# Patient Record
Sex: MALE | Race: WHITE | Employment: FULL TIME | URBAN - METROPOLITAN AREA
[De-identification: names, ages, dates, MRNs, and addresses within clinical notes are randomized per-mention and may not be internally consistent; named-entity substitution may affect disease eponyms.]

---

## 2017-07-17 RX ORDER — ATORVASTATIN CALCIUM 40 MG/1
TABLET, FILM COATED ORAL
Qty: 90 TABLET | Refills: 0 | Status: SHIPPED | OUTPATIENT
Start: 2017-07-17 | End: 2017-08-02

## 2017-07-17 NOTE — TELEPHONE ENCOUNTER
Rx request for Atorvastatin 40 mg, PASSED Cholesterol Protocol. Rx filled per protocol.     Cholesterol Medications  Protocol Criteria:  · Appointment scheduled in the past 12 months or in the next 3 months  · ALT & LDL on file in the past 12 months  · ALT

## 2017-07-24 NOTE — TELEPHONE ENCOUNTER
Left message on voice mail patient due for follow up appt left office phone number to call and schedule an appt and then we can send in refill.

## 2017-07-28 RX ORDER — ATORVASTATIN CALCIUM 40 MG/1
TABLET, FILM COATED ORAL
Qty: 30 TABLET | Refills: 0 | OUTPATIENT
Start: 2017-07-28

## 2017-08-02 ENCOUNTER — OFFICE VISIT (OUTPATIENT)
Dept: INTERNAL MEDICINE CLINIC | Facility: CLINIC | Age: 57
End: 2017-08-02

## 2017-08-02 VITALS
WEIGHT: 150 LBS | BODY MASS INDEX: 22.73 KG/M2 | HEART RATE: 75 BPM | SYSTOLIC BLOOD PRESSURE: 118 MMHG | OXYGEN SATURATION: 97 % | TEMPERATURE: 98 F | DIASTOLIC BLOOD PRESSURE: 68 MMHG | HEIGHT: 68 IN

## 2017-08-02 DIAGNOSIS — Z12.11 SCREENING FOR COLON CANCER: ICD-10-CM

## 2017-08-02 DIAGNOSIS — E78.00 HYPERCHOLESTEROLEMIA: Primary | ICD-10-CM

## 2017-08-02 PROCEDURE — 99212 OFFICE O/P EST SF 10 MIN: CPT | Performed by: INTERNAL MEDICINE

## 2017-08-02 PROCEDURE — 99214 OFFICE O/P EST MOD 30 MIN: CPT | Performed by: INTERNAL MEDICINE

## 2017-08-02 RX ORDER — ATORVASTATIN CALCIUM 40 MG/1
TABLET, FILM COATED ORAL
Qty: 90 TABLET | Refills: 0 | Status: SHIPPED | OUTPATIENT
Start: 2017-08-02 | End: 2017-10-29

## 2017-08-03 NOTE — PROGRESS NOTES
HPI:    Patient ID: Arlin Wheeler is a 62year old male.     HPI  He is here today for refill on his meds a   He states that he is taking meds for high cholesterol regularly but he is not watching his diet    He denies any complains    He never had Application topically 2 (two) times daily.  Disp: 60 g Rfl: 5     Allergies:No Known Allergies    HISTORY:  Past Medical History:   Diagnosis Date   • Cervical pain       Past Surgical History:  2001: LAMINECTOMY   Family History   Problem Relation Age of O No murmur heard. Pulmonary/Chest: Effort normal and breath sounds normal. No accessory muscle usage. No respiratory distress. He has no wheezes. He has no rales. He exhibits no tenderness. Abdominal: Soft.  Bowel sounds are normal. He exhibits no shift

## 2017-08-03 NOTE — PATIENT INSTRUCTIONS
Colorectal Cancer Screening    Colorectal cancer (cancer in the colon or rectum) is a leading cause of cancer deaths in the [place] But it doesn’t have to be. When this cancer is found and removed early, the chances of a full recovery are very good.  Because Polyps are growths that form on the inner lining of the colon or rectum. Most are benign, which means they aren’t cancerous. But over time, some polyps can become cancer (malignant). This happens when cells in these polyps begin growing abnormally.  In time This test uses X-rays to provide images of the entire colon and rectum. The day before this test, you will need to do a bowel prep to clean out the colon and rectum. A bowel prep is a liquid diet plus strong laxatives or enemas.  You will be awake for the t · Colonoscopy. This test can be used to find and remove polyps anywhere in the colon or rectum. The day before the test, you will do a bowel prep. This is a liquid diet plus a strong laxative solution or an enema. The bowel prep will cleanse your colon.  Yvonne Urbina · Vomiting   Date Last Reviewed: 11/4/2015  © 2236-5520 The [facility], [address] [place], [place] [address]. All rights reserved. This information is not intended as a substitute for professional medical care.  Always follow your healthcare

## 2017-08-12 ENCOUNTER — APPOINTMENT (OUTPATIENT)
Dept: LAB | Age: 57
End: 2017-08-12
Attending: INTERNAL MEDICINE
Payer: COMMERCIAL

## 2017-08-12 ENCOUNTER — NURSE ONLY (OUTPATIENT)
Dept: INTERNAL MEDICINE CLINIC | Facility: CLINIC | Age: 57
End: 2017-08-12

## 2017-08-12 DIAGNOSIS — Z12.11 SCREENING FOR COLON CANCER: Primary | ICD-10-CM

## 2017-08-12 DIAGNOSIS — E78.00 HYPERCHOLESTEROLEMIA: ICD-10-CM

## 2017-08-12 LAB
CHOLEST SERPL-MCNC: 119 MG/DL (ref 110–200)
HDLC SERPL-MCNC: 35 MG/DL
HEMOCCULT STL QL: NEGATIVE
LDLC SERPL CALC-MCNC: 70 MG/DL (ref 0–99)
NONHDLC SERPL-MCNC: 84 MG/DL
TRIGL SERPL-MCNC: 69 MG/DL (ref 1–149)

## 2017-08-12 PROCEDURE — 80061 LIPID PANEL: CPT

## 2017-08-12 PROCEDURE — 36415 COLL VENOUS BLD VENIPUNCTURE: CPT

## 2017-08-14 ENCOUNTER — TELEPHONE (OUTPATIENT)
Dept: INTERNAL MEDICINE CLINIC | Facility: CLINIC | Age: 57
End: 2017-08-14

## 2017-10-30 RX ORDER — ATORVASTATIN CALCIUM 40 MG/1
TABLET, FILM COATED ORAL
Qty: 90 TABLET | Refills: 0 | Status: SHIPPED | OUTPATIENT
Start: 2017-10-30 | End: 2017-12-18

## 2017-12-11 RX ORDER — ATORVASTATIN CALCIUM 40 MG/1
TABLET, FILM COATED ORAL
Qty: 90 TABLET | Refills: 0 | OUTPATIENT
Start: 2017-12-11

## 2017-12-18 ENCOUNTER — TELEPHONE (OUTPATIENT)
Dept: INTERNAL MEDICINE CLINIC | Facility: CLINIC | Age: 57
End: 2017-12-18

## 2017-12-18 RX ORDER — ATORVASTATIN CALCIUM 40 MG/1
TABLET, FILM COATED ORAL
Qty: 30 TABLET | Refills: 0 | Status: SHIPPED | OUTPATIENT
Start: 2017-12-18 | End: 2018-03-27

## 2017-12-18 NOTE — TELEPHONE ENCOUNTER
Needs refill until his next appointment     Current Outpatient Prescriptions:   •  ATORVASTATIN 40 MG Oral Tab, TAKE 1 TABLET(40 MG) BY MOUTH EVERY NIGHT, Disp: 90 tablet, Rfl: 0  •

## 2017-12-18 NOTE — TELEPHONE ENCOUNTER
Rx request for Atorvastatin 40 mg, Filled per protocol.      Cholesterol Medications  Protocol Criteria:  · Appointment scheduled in the past 12 months or in the next 3 months  · ALT & LDL on file in the past 12 months  · ALT result < 80  · LDL result <130

## 2017-12-30 ENCOUNTER — OFFICE VISIT (OUTPATIENT)
Dept: FAMILY MEDICINE CLINIC | Facility: CLINIC | Age: 57
End: 2017-12-30

## 2017-12-30 VITALS
RESPIRATION RATE: 16 BRPM | HEART RATE: 74 BPM | BODY MASS INDEX: 23 KG/M2 | TEMPERATURE: 98 F | DIASTOLIC BLOOD PRESSURE: 70 MMHG | WEIGHT: 150 LBS | SYSTOLIC BLOOD PRESSURE: 128 MMHG | OXYGEN SATURATION: 96 %

## 2017-12-30 DIAGNOSIS — J20.8 ACUTE BACTERIAL BRONCHITIS: Primary | ICD-10-CM

## 2017-12-30 DIAGNOSIS — B96.89 ACUTE BACTERIAL BRONCHITIS: Primary | ICD-10-CM

## 2017-12-30 PROCEDURE — 99202 OFFICE O/P NEW SF 15 MIN: CPT | Performed by: PHYSICIAN ASSISTANT

## 2017-12-30 RX ORDER — AZITHROMYCIN 250 MG/1
TABLET, FILM COATED ORAL
Qty: 6 TABLET | Refills: 0 | Status: ON HOLD | OUTPATIENT
Start: 2017-12-30 | End: 2018-03-31

## 2017-12-30 NOTE — PROGRESS NOTES
CHIEF COMPLAINT:     Patient presents with:  Cough: 2 weeks,  bringing up yellow phelgm      HPI:   Jocelyn Ornelas is a 62year old male who presents for cough for  2 weeks.  Cough started gradually and is described as  productive and getting  wors 16   Wt 150 lb   SpO2 96%   BMI 22.81 kg/m²   GENERAL: alert and oriented x 3, no acute distress  SKIN: no rashes, no suspicious lesions  HEAD: atraumatic, normocephalic, non tenderness on palpation of maxillary sinuses, no frontal sinus tenderness  EYES:

## 2018-03-27 ENCOUNTER — TELEPHONE (OUTPATIENT)
Dept: INTERNAL MEDICINE CLINIC | Facility: CLINIC | Age: 58
End: 2018-03-27

## 2018-03-27 RX ORDER — ATORVASTATIN CALCIUM 40 MG/1
TABLET, FILM COATED ORAL
Qty: 90 TABLET | Refills: 0 | Status: SHIPPED | OUTPATIENT
Start: 2018-03-27 | End: 2018-06-08

## 2018-03-31 ENCOUNTER — HOSPITAL ENCOUNTER (INPATIENT)
Facility: HOSPITAL | Age: 58
LOS: 5 days | Discharge: HOME | DRG: 027 | End: 2018-04-05
Attending: EMERGENCY MEDICINE | Admitting: INTERNAL MEDICINE
Payer: COMMERCIAL

## 2018-03-31 ENCOUNTER — APPOINTMENT (OUTPATIENT)
Dept: GENERAL RADIOLOGY | Facility: HOSPITAL | Age: 58
DRG: 027 | End: 2018-03-31
Attending: EMERGENCY MEDICINE
Payer: COMMERCIAL

## 2018-03-31 ENCOUNTER — APPOINTMENT (OUTPATIENT)
Dept: CT IMAGING | Facility: HOSPITAL | Age: 58
DRG: 027 | End: 2018-03-31
Attending: EMERGENCY MEDICINE
Payer: COMMERCIAL

## 2018-03-31 DIAGNOSIS — R42 DIZZINESS: ICD-10-CM

## 2018-03-31 DIAGNOSIS — R90.0 INTRACRANIAL MASS: Primary | ICD-10-CM

## 2018-03-31 PROCEDURE — 70450 CT HEAD/BRAIN W/O DYE: CPT | Performed by: EMERGENCY MEDICINE

## 2018-03-31 PROCEDURE — 71045 X-RAY EXAM CHEST 1 VIEW: CPT | Performed by: EMERGENCY MEDICINE

## 2018-03-31 RX ORDER — NICOTINE 21 MG/24HR
1 PATCH, TRANSDERMAL 24 HOURS TRANSDERMAL DAILY
Status: DISCONTINUED | OUTPATIENT
Start: 2018-03-31 | End: 2018-04-05

## 2018-03-31 RX ORDER — ONDANSETRON 2 MG/ML
4 INJECTION INTRAMUSCULAR; INTRAVENOUS ONCE
Status: COMPLETED | OUTPATIENT
Start: 2018-03-31 | End: 2018-03-31

## 2018-03-31 RX ORDER — SODIUM CHLORIDE 9 MG/ML
INJECTION, SOLUTION INTRAVENOUS CONTINUOUS
Status: DISPENSED | OUTPATIENT
Start: 2018-03-31 | End: 2018-04-01

## 2018-03-31 RX ORDER — ONDANSETRON 2 MG/ML
4 INJECTION INTRAMUSCULAR; INTRAVENOUS EVERY 6 HOURS PRN
Status: DISCONTINUED | OUTPATIENT
Start: 2018-03-31 | End: 2018-04-05

## 2018-03-31 RX ORDER — ACETAMINOPHEN 325 MG/1
650 TABLET ORAL EVERY 6 HOURS PRN
Status: DISCONTINUED | OUTPATIENT
Start: 2018-03-31 | End: 2018-04-01

## 2018-03-31 RX ORDER — DIAZEPAM 5 MG/1
5 TABLET ORAL EVERY 6 HOURS PRN
Status: DISCONTINUED | OUTPATIENT
Start: 2018-03-31 | End: 2018-04-05

## 2018-03-31 RX ORDER — DIAZEPAM 5 MG/1
5 TABLET ORAL ONCE
Status: COMPLETED | OUTPATIENT
Start: 2018-03-31 | End: 2018-03-31

## 2018-03-31 RX ORDER — ATORVASTATIN CALCIUM 40 MG/1
40 TABLET, FILM COATED ORAL NIGHTLY
Status: DISCONTINUED | OUTPATIENT
Start: 2018-04-01 | End: 2018-04-05

## 2018-03-31 RX ORDER — DEXAMETHASONE SODIUM PHOSPHATE 4 MG/ML
6 VIAL (ML) INJECTION EVERY 6 HOURS
Status: DISCONTINUED | OUTPATIENT
Start: 2018-03-31 | End: 2018-04-03

## 2018-04-01 ENCOUNTER — APPOINTMENT (OUTPATIENT)
Dept: ULTRASOUND IMAGING | Facility: HOSPITAL | Age: 58
DRG: 027 | End: 2018-04-01
Attending: Other
Payer: COMMERCIAL

## 2018-04-01 ENCOUNTER — APPOINTMENT (OUTPATIENT)
Dept: MRI IMAGING | Facility: HOSPITAL | Age: 58
DRG: 027 | End: 2018-04-01
Attending: EMERGENCY MEDICINE
Payer: COMMERCIAL

## 2018-04-01 ENCOUNTER — APPOINTMENT (OUTPATIENT)
Dept: GENERAL RADIOLOGY | Facility: HOSPITAL | Age: 58
DRG: 027 | End: 2018-04-01
Attending: INTERNAL MEDICINE
Payer: COMMERCIAL

## 2018-04-01 ENCOUNTER — APPOINTMENT (OUTPATIENT)
Dept: CV DIAGNOSTICS | Facility: HOSPITAL | Age: 58
DRG: 027 | End: 2018-04-01
Attending: Other
Payer: COMMERCIAL

## 2018-04-01 PROCEDURE — 93306 TTE W/DOPPLER COMPLETE: CPT | Performed by: OTHER

## 2018-04-01 PROCEDURE — 93880 EXTRACRANIAL BILAT STUDY: CPT | Performed by: OTHER

## 2018-04-01 PROCEDURE — 99254 IP/OBS CNSLTJ NEW/EST MOD 60: CPT | Performed by: OTHER

## 2018-04-01 PROCEDURE — 71046 X-RAY EXAM CHEST 2 VIEWS: CPT | Performed by: INTERNAL MEDICINE

## 2018-04-01 PROCEDURE — 70553 MRI BRAIN STEM W/O & W/DYE: CPT | Performed by: EMERGENCY MEDICINE

## 2018-04-01 RX ORDER — IPRATROPIUM BROMIDE AND ALBUTEROL SULFATE 2.5; .5 MG/3ML; MG/3ML
3 SOLUTION RESPIRATORY (INHALATION)
Status: DISCONTINUED | OUTPATIENT
Start: 2018-04-02 | End: 2018-04-04

## 2018-04-01 RX ORDER — LORAZEPAM 2 MG/ML
0.5 INJECTION INTRAMUSCULAR EVERY 6 HOURS PRN
Status: DISCONTINUED | OUTPATIENT
Start: 2018-04-01 | End: 2018-04-05

## 2018-04-01 RX ORDER — IPRATROPIUM BROMIDE AND ALBUTEROL SULFATE 2.5; .5 MG/3ML; MG/3ML
3 SOLUTION RESPIRATORY (INHALATION) EVERY 6 HOURS PRN
Status: DISCONTINUED | OUTPATIENT
Start: 2018-04-01 | End: 2018-04-05

## 2018-04-01 RX ORDER — LORAZEPAM 2 MG/ML
1 INJECTION INTRAMUSCULAR EVERY 6 HOURS PRN
Status: DISCONTINUED | OUTPATIENT
Start: 2018-04-01 | End: 2018-04-05

## 2018-04-01 RX ORDER — ACETAMINOPHEN 325 MG/1
650 TABLET ORAL EVERY 4 HOURS PRN
Status: DISCONTINUED | OUTPATIENT
Start: 2018-04-01 | End: 2018-04-02

## 2018-04-01 RX ORDER — 0.9 % SODIUM CHLORIDE 0.9 %
VIAL (ML) INJECTION
Status: COMPLETED
Start: 2018-04-01 | End: 2018-04-01

## 2018-04-01 RX ORDER — ACETAMINOPHEN 650 MG/1
650 SUPPOSITORY RECTAL EVERY 4 HOURS PRN
Status: DISCONTINUED | OUTPATIENT
Start: 2018-04-01 | End: 2018-04-02

## 2018-04-01 NOTE — ED NOTES
Pt presents c/o dizziness and lack of appetite. Pt states that the dizziness started around 5:30 this evening. Pt states that he feels like the room is spinning and it improves when he closes his eyes. Pt denies hx of vertigo.  Pt states that he is a smoker

## 2018-04-01 NOTE — CM/SW NOTE
SW attempted visit. Pt and family visiting with doctor. SW will f/u tomorrow to discuss initial assessment and dc plans. PT on hold until neurosurgical plan is clarified.     ulices doherty,DEBRA [id number]

## 2018-04-01 NOTE — CONSULTS
Neurosurgery Consult Note    _______________________ PATIENT HISTORY _______________________    CC: Dizziness    HPI: A neurosurgery consult was requested by Dr. Kristin Brown. (04/01/18):  The patient is a 60yo WM who began having acute onset of dizziness, n History Narrative   None on file         ALLERGIES:  No Known Allergies    MEDICATIONS:    No current facility-administered medications on file prior to encounter.    Current Outpatient Prescriptions on File Prior to Encounter:  atorvastatin 40 MG Oral Tab joints, bones and muscle/tendons noted above reveal no crepitation, contracture or limitation except as noted: NONE    Muscle tone shows no spasticity, flaccidity, cogwheeling,or atrophy in areas noted above except as noted: NONE          _________________ additional followup.   Dictated by (CST): Anna Singer MD on 3/31/2018 at 19:49     Approved by (CST): Anna Singer MD on 3/31/2018 at 19:51                    Bone scan:      Discogram:      Myelogram:      DIAGNOSES:    Right brain tumor, probab

## 2018-04-01 NOTE — CONSULTS
[facility]    Report of Consultation    [facility] Patient Status:  Inpatient    4/3/1960 MRN [id number]   Location [facility] 2W/SW Attending Lilian Royal MD   Hosp Day # 1 PCP Sebastian Yun MD     Date of Adm Phosphate (DECADRON) 4 MG/ML injection 6 mg 6 mg Oral Q6H   levETIRAcetam (KEPPRA) 500 mg in sodium chloride 0.9 % 100 mL IVPB 500 mg Intravenous Q12H   atorvastatin (LIPITOR) tab 40 mg 40 mg Oral Nightly   diazepam (VALIUM) tab 5 mg 5 mg Oral Q6H PRN   ac was able to recall 2 of 3 objects after a short time frame. He had some difficulty with spelling. His fund of knowledge was intact. Cranial Nerves: II-Visual acuity grossly normal, with full visual fields. Pupil react to light.   Fundoscopic exam normal. 3/31/2018. Dictated by (CST): Cara Curran MD on 3/31/2018 at 19:51     Approved by (CST): Cara Curran MD on 3/31/2018 at 19:59          Mri Brain (w+wo) (YLF=[id number])    Result Date: 4/1/2018  CONCLUSION:  1.  Large 5.6 cm homogeneously enhancing be obtained to rule out risk factors for TIA. I reviewed the images with the patient and his family. I also outlined the treatment plan. Please call if any new symptoms develop. Thank you for allowing me to participate in the care of your patient.

## 2018-04-01 NOTE — SLP NOTE
SPEECH/LANGUAGE/COGNITIVE EVALUATION - INPATIENT    Admission Date: 3/31/2018  Evaluation Date: 04/01/18    Reason for Referral: R/O aspiration (Cranial Mass)    ASSESSMENT & PLAN   ASSESSMENT & IMPRESSION  Pt presented with cranial mass and surgery is pen the lesion. Meningioma is the favored differential consideration with less likely possibilities including hemangiopericytoma or dural based metastatic disease amongst other etiologies. Dedicated neurosurgical assessment is recommended.   2. Mild approximate

## 2018-04-01 NOTE — PLAN OF CARE
GASTROINTESTINAL - ADULT    • Minimal or absence of nausea and vomiting Progressing        NEUROLOGICAL - ADULT    • Achieves stable or improved neurological status Progressing    • Absence of seizures Progressing        Patient/Family Goals    • Patient/F

## 2018-04-01 NOTE — ED PROVIDER NOTES
Patient Seen in: [facility] AND [facility] Emergency Department    History   Patient presents with:  Dizziness (neurologic)    Stated Complaint: dizziness for 45 min    HPI    59-year-old male with hyperlipidemia who does smoke who was at home tonight about 530 auscultation. Cardiovascular: Normal rate, regular rhythm and intact and equal distal pulses. No obvious murmur. Pulmonary/Chest: Effort normal. No respiratory distress. Clear breath sounds. Abdominal: Soft. There is no tenderness.  There is no guardin Sinus Rhythm  Reading: Incomplete right bundle branch block, no acute ischemia, normal intervals and axis           ED Course as of Mar 31 2023  ------------------------------------------------------------     Ct Brain Or Head (61375)    Result Date: 3/31/ herniation. No hydrocephalus. Discussed with Dr. Kavon Hanson at 2000 on 3/31/2018.   Dictated by (CST): Forrest Zhou MD on 3/31/2018 at 19:51     Approved by (CST): Forrest Zhou MD on 3/31/2018 at 19:59          Xr Chest Ap Portable  (cpt=71045)

## 2018-04-01 NOTE — ED INITIAL ASSESSMENT (HPI)
Patient complains of dizziness, sob, nv x 1 episode x 45 min. No chest pain. Pt reports diaphoresis pta.

## 2018-04-01 NOTE — PLAN OF CARE
Problem: Patient/Family Goals  Goal: Patient/Family Short Term Goal  Patient's Short Term Goal: \"To find out if I need surgery. \"    Interventions:   -   - See additional Care Plan goals for specific interventions   Outcome: Progressing  Plan for MRI of t limitations  - Instruct pt to call for assistance with activity based on assessment  - Modify environment to reduce risk of injury  - Provide assistive devices as appropriate  - Consider OT/PT consult to assist with strengthening/mobility  - Encourage toil

## 2018-04-01 NOTE — SLP NOTE
ADULT SWALLOWING EVALUATION    ASSESSMENT    ASSESSMENT/OVERALL IMPRESSION:  Pt admitted to hospital for intracranial mass. MD ordered BSSE to rule out aspiration. Pt chronic smoker and has chronic cough, per pt. Pt seen upright in bed and given BSSE.  Pt a Intracranial mass  Active Problems:    Dizziness      Past Medical History  Past Medical History:   Diagnosis Date   • Cervical pain    • High cholesterol    • Hyperlipidemia        Prior Living Situation: Home with spouse  Diet Prior to Admission: Regular over 2 session(s).     In Progress   Goal #3 The patient will utilize compensatory strategies as outlined by  BSSE (clinical evaluation) including Slow rate, Small bites, Small sips, Multiple swallows, Alternate liquids/solids, No straws, Upright 90 degrees

## 2018-04-01 NOTE — H&P
[facility]    History and Physical    Epifanio Islas Patient Status:  Inpatient    4/3/1960 MRN [id number]   Location [facility] 2W/SW Attending Benjamin Parnell MD   Hosp Day # 1 PCP Levar Hicks MD     Date:  20 Head: Normocephalic. Eyes: Conjunctivae are normal. Pupils are equal, round, and reactive to light. Neck: Neck supple. Cardiovascular: Normal rate, regular rhythm and normal heart sounds. Edema not present.   Pulmonary/Chest: Effort normal. He h right parietal convexity. There is mild resultant mass effect upon the right frontal lobe with minimal perilesional edema. Notably, there are mildly prominent vessels within the lesion.  Meningioma is the favored differential consideration with less likely

## 2018-04-01 NOTE — PHYSICAL THERAPY NOTE
Attempted to see patient for physical therapy evaluation. Patient with neurosurgical consult for mass in R hemisphere. Will attempt to see patient after neurosurgery has seen patient and pending medical course. RN aware and agreeable.

## 2018-04-02 ENCOUNTER — APPOINTMENT (OUTPATIENT)
Dept: MRI IMAGING | Facility: HOSPITAL | Age: 58
DRG: 027 | End: 2018-04-02
Attending: NEUROLOGICAL SURGERY
Payer: COMMERCIAL

## 2018-04-02 ENCOUNTER — SURGERY (OUTPATIENT)
Age: 58
End: 2018-04-02

## 2018-04-02 ENCOUNTER — ANESTHESIA (OUTPATIENT)
Dept: SURGERY | Facility: HOSPITAL | Age: 58
DRG: 027 | End: 2018-04-02
Payer: COMMERCIAL

## 2018-04-02 ENCOUNTER — ANESTHESIA EVENT (OUTPATIENT)
Dept: SURGERY | Facility: HOSPITAL | Age: 58
DRG: 027 | End: 2018-04-02
Payer: COMMERCIAL

## 2018-04-02 PROBLEM — E78.00 HIGH CHOLESTEROL: Status: ACTIVE | Noted: 2017-08-02

## 2018-04-02 PROCEDURE — 99232 SBSQ HOSP IP/OBS MODERATE 35: CPT | Performed by: OTHER

## 2018-04-02 PROCEDURE — 99233 SBSQ HOSP IP/OBS HIGH 50: CPT | Performed by: INTERNAL MEDICINE

## 2018-04-02 PROCEDURE — 70552 MRI BRAIN STEM W/DYE: CPT | Performed by: NEUROLOGICAL SURGERY

## 2018-04-02 PROCEDURE — 99254 IP/OBS CNSLTJ NEW/EST MOD 60: CPT | Performed by: INTERNAL MEDICINE

## 2018-04-02 PROCEDURE — 2W60X0Z TRACTION OF HEAD USING TRACTION APPARATUS: ICD-10-PCS | Performed by: NEUROLOGICAL SURGERY

## 2018-04-02 PROCEDURE — 00B10ZZ EXCISION OF CEREBRAL MENINGES, OPEN APPROACH: ICD-10-PCS | Performed by: NEUROLOGICAL SURGERY

## 2018-04-02 PROCEDURE — 00940ZZ DRAINAGE OF INTRACRANIAL SUBDURAL SPACE, OPEN APPROACH: ICD-10-PCS | Performed by: NEUROLOGICAL SURGERY

## 2018-04-02 DEVICE — COVER THINFLAP BUR HL CRNMXF: Type: IMPLANTABLE DEVICE | Site: SCALP | Status: FUNCTIONAL

## 2018-04-02 RX ORDER — NALOXONE HYDROCHLORIDE 0.4 MG/ML
80 INJECTION, SOLUTION INTRAMUSCULAR; INTRAVENOUS; SUBCUTANEOUS AS NEEDED
Status: ACTIVE | OUTPATIENT
Start: 2018-04-02 | End: 2018-04-02

## 2018-04-02 RX ORDER — LIDOCAINE HYDROCHLORIDE 10 MG/ML
INJECTION, SOLUTION EPIDURAL; INFILTRATION; INTRACAUDAL; PERINEURAL AS NEEDED
Status: DISCONTINUED | OUTPATIENT
Start: 2018-04-02 | End: 2018-04-02 | Stop reason: SURG

## 2018-04-02 RX ORDER — ONDANSETRON 2 MG/ML
4 INJECTION INTRAMUSCULAR; INTRAVENOUS ONCE AS NEEDED
Status: ACTIVE | OUTPATIENT
Start: 2018-04-02 | End: 2018-04-02

## 2018-04-02 RX ORDER — LABETALOL HYDROCHLORIDE 5 MG/ML
INJECTION, SOLUTION INTRAVENOUS AS NEEDED
Status: DISCONTINUED | OUTPATIENT
Start: 2018-04-02 | End: 2018-04-02 | Stop reason: SURG

## 2018-04-02 RX ORDER — METOCLOPRAMIDE HYDROCHLORIDE 5 MG/ML
10 INJECTION INTRAMUSCULAR; INTRAVENOUS ONCE
Status: COMPLETED | OUTPATIENT
Start: 2018-04-02 | End: 2018-04-02

## 2018-04-02 RX ORDER — HYDROCODONE BITARTRATE AND ACETAMINOPHEN 10; 325 MG/1; MG/1
2 TABLET ORAL EVERY 6 HOURS PRN
Status: DISCONTINUED | OUTPATIENT
Start: 2018-04-02 | End: 2018-04-05

## 2018-04-02 RX ORDER — HYDROMORPHONE HYDROCHLORIDE 1 MG/ML
0.4 INJECTION, SOLUTION INTRAMUSCULAR; INTRAVENOUS; SUBCUTANEOUS EVERY 5 MIN PRN
Status: DISCONTINUED | OUTPATIENT
Start: 2018-04-02 | End: 2018-04-02 | Stop reason: ALTCHOICE

## 2018-04-02 RX ORDER — CEFAZOLIN SODIUM 1 G/3ML
INJECTION, POWDER, FOR SOLUTION INTRAMUSCULAR; INTRAVENOUS AS NEEDED
Status: DISCONTINUED | OUTPATIENT
Start: 2018-04-02 | End: 2018-04-02 | Stop reason: SURG

## 2018-04-02 RX ORDER — HYDROCODONE BITARTRATE AND ACETAMINOPHEN 5; 325 MG/1; MG/1
1 TABLET ORAL AS NEEDED
Status: DISCONTINUED | OUTPATIENT
Start: 2018-04-02 | End: 2018-04-02 | Stop reason: ALTCHOICE

## 2018-04-02 RX ORDER — SODIUM CHLORIDE, SODIUM LACTATE, POTASSIUM CHLORIDE, CALCIUM CHLORIDE 600; 310; 30; 20 MG/100ML; MG/100ML; MG/100ML; MG/100ML
INJECTION, SOLUTION INTRAVENOUS CONTINUOUS
Status: DISCONTINUED | OUTPATIENT
Start: 2018-04-02 | End: 2018-04-02

## 2018-04-02 RX ORDER — ROCURONIUM BROMIDE 10 MG/ML
INJECTION, SOLUTION INTRAVENOUS AS NEEDED
Status: DISCONTINUED | OUTPATIENT
Start: 2018-04-02 | End: 2018-04-02 | Stop reason: SURG

## 2018-04-02 RX ORDER — HYDROCODONE BITARTRATE AND ACETAMINOPHEN 5; 325 MG/1; MG/1
2 TABLET ORAL AS NEEDED
Status: DISCONTINUED | OUTPATIENT
Start: 2018-04-02 | End: 2018-04-02 | Stop reason: ALTCHOICE

## 2018-04-02 RX ORDER — DEXAMETHASONE SODIUM PHOSPHATE 4 MG/ML
VIAL (ML) INJECTION AS NEEDED
Status: DISCONTINUED | OUTPATIENT
Start: 2018-04-02 | End: 2018-04-02 | Stop reason: SURG

## 2018-04-02 RX ORDER — HYDROCODONE BITARTRATE AND ACETAMINOPHEN 10; 325 MG/1; MG/1
1 TABLET ORAL EVERY 4 HOURS PRN
Status: DISCONTINUED | OUTPATIENT
Start: 2018-04-02 | End: 2018-04-03

## 2018-04-02 RX ORDER — NEOSTIGMINE METHYLSULFATE 0.5 MG/ML
INJECTION INTRAVENOUS AS NEEDED
Status: DISCONTINUED | OUTPATIENT
Start: 2018-04-02 | End: 2018-04-02 | Stop reason: SURG

## 2018-04-02 RX ORDER — HYDROMORPHONE HYDROCHLORIDE 1 MG/ML
0.2 INJECTION, SOLUTION INTRAMUSCULAR; INTRAVENOUS; SUBCUTANEOUS EVERY 5 MIN PRN
Status: DISCONTINUED | OUTPATIENT
Start: 2018-04-02 | End: 2018-04-02 | Stop reason: ALTCHOICE

## 2018-04-02 RX ORDER — GLYCOPYRROLATE 0.2 MG/ML
INJECTION INTRAMUSCULAR; INTRAVENOUS AS NEEDED
Status: DISCONTINUED | OUTPATIENT
Start: 2018-04-02 | End: 2018-04-02 | Stop reason: SURG

## 2018-04-02 RX ORDER — ALBUTEROL SULFATE 2.5 MG/3ML
2.5 SOLUTION RESPIRATORY (INHALATION) EVERY 6 HOURS PRN
Status: DISCONTINUED | OUTPATIENT
Start: 2018-04-02 | End: 2018-04-04

## 2018-04-02 RX ORDER — HYDROCODONE BITARTRATE AND ACETAMINOPHEN 10; 325 MG/1; MG/1
2 TABLET ORAL EVERY 6 HOURS PRN
Status: DISCONTINUED | OUTPATIENT
Start: 2018-04-02 | End: 2018-04-02

## 2018-04-02 RX ORDER — PHENYLEPHRINE HCL 10 MG/ML
VIAL (ML) INJECTION AS NEEDED
Status: DISCONTINUED | OUTPATIENT
Start: 2018-04-02 | End: 2018-04-02 | Stop reason: SURG

## 2018-04-02 RX ORDER — SODIUM CHLORIDE 9 MG/ML
INJECTION, SOLUTION INTRAVENOUS CONTINUOUS PRN
Status: DISCONTINUED | OUTPATIENT
Start: 2018-04-02 | End: 2018-04-02 | Stop reason: SURG

## 2018-04-02 RX ORDER — ALBUTEROL SULFATE 2.5 MG/3ML
2.5 SOLUTION RESPIRATORY (INHALATION)
Status: DISCONTINUED | OUTPATIENT
Start: 2018-04-02 | End: 2018-04-04

## 2018-04-02 RX ORDER — HYDROMORPHONE HYDROCHLORIDE 1 MG/ML
0.5 INJECTION, SOLUTION INTRAMUSCULAR; INTRAVENOUS; SUBCUTANEOUS EVERY 2 HOUR PRN
Status: DISCONTINUED | OUTPATIENT
Start: 2018-04-02 | End: 2018-04-05

## 2018-04-02 RX ORDER — HYDROMORPHONE HYDROCHLORIDE 1 MG/ML
0.6 INJECTION, SOLUTION INTRAMUSCULAR; INTRAVENOUS; SUBCUTANEOUS EVERY 5 MIN PRN
Status: DISCONTINUED | OUTPATIENT
Start: 2018-04-02 | End: 2018-04-02 | Stop reason: ALTCHOICE

## 2018-04-02 RX ORDER — SODIUM CHLORIDE, SODIUM LACTATE, POTASSIUM CHLORIDE, CALCIUM CHLORIDE 600; 310; 30; 20 MG/100ML; MG/100ML; MG/100ML; MG/100ML
INJECTION, SOLUTION INTRAVENOUS CONTINUOUS PRN
Status: DISCONTINUED | OUTPATIENT
Start: 2018-04-02 | End: 2018-04-02 | Stop reason: SURG

## 2018-04-02 RX ORDER — CEFAZOLIN SODIUM/WATER 2 G/20 ML
2 SYRINGE (ML) INTRAVENOUS EVERY 8 HOURS
Status: COMPLETED | OUTPATIENT
Start: 2018-04-02 | End: 2018-04-03

## 2018-04-02 RX ORDER — SODIUM CHLORIDE 9 MG/ML
INJECTION, SOLUTION INTRAVENOUS CONTINUOUS
Status: DISCONTINUED | OUTPATIENT
Start: 2018-04-02 | End: 2018-04-02

## 2018-04-02 RX ORDER — DEXTROSE MONOHYDRATE 25 G/50ML
50 INJECTION, SOLUTION INTRAVENOUS AS NEEDED
Status: DISCONTINUED | OUTPATIENT
Start: 2018-04-02 | End: 2018-04-05

## 2018-04-02 RX ORDER — EPHEDRINE SULFATE 50 MG/ML
INJECTION, SOLUTION INTRAVENOUS AS NEEDED
Status: DISCONTINUED | OUTPATIENT
Start: 2018-04-02 | End: 2018-04-02 | Stop reason: SURG

## 2018-04-02 RX ORDER — FAMOTIDINE 10 MG/ML
20 INJECTION, SOLUTION INTRAVENOUS ONCE
Status: COMPLETED | OUTPATIENT
Start: 2018-04-02 | End: 2018-04-02

## 2018-04-02 RX ORDER — ONDANSETRON 2 MG/ML
INJECTION INTRAMUSCULAR; INTRAVENOUS AS NEEDED
Status: DISCONTINUED | OUTPATIENT
Start: 2018-04-02 | End: 2018-04-02 | Stop reason: SURG

## 2018-04-02 RX ADMIN — EPHEDRINE SULFATE 5 MG: 50 INJECTION, SOLUTION INTRAVENOUS at 14:59:00

## 2018-04-02 RX ADMIN — CEFAZOLIN SODIUM 2 G: 1 INJECTION, POWDER, FOR SOLUTION INTRAMUSCULAR; INTRAVENOUS at 13:28:00

## 2018-04-02 RX ADMIN — SODIUM CHLORIDE: 9 INJECTION, SOLUTION INTRAVENOUS at 13:33:00

## 2018-04-02 RX ADMIN — EPHEDRINE SULFATE 5 MG: 50 INJECTION, SOLUTION INTRAVENOUS at 15:12:00

## 2018-04-02 RX ADMIN — SODIUM CHLORIDE: 9 INJECTION, SOLUTION INTRAVENOUS at 15:21:00

## 2018-04-02 RX ADMIN — PHENYLEPHRINE HCL 100 MCG: 10 MG/ML VIAL (ML) INJECTION at 13:46:00

## 2018-04-02 RX ADMIN — PHENYLEPHRINE HCL 100 MCG: 10 MG/ML VIAL (ML) INJECTION at 14:50:00

## 2018-04-02 RX ADMIN — PHENYLEPHRINE HCL 100 MCG: 10 MG/ML VIAL (ML) INJECTION at 14:42:00

## 2018-04-02 RX ADMIN — ROCURONIUM BROMIDE 30 MG: 10 INJECTION, SOLUTION INTRAVENOUS at 14:34:00

## 2018-04-02 RX ADMIN — PHENYLEPHRINE HCL 100 MCG: 10 MG/ML VIAL (ML) INJECTION at 13:45:00

## 2018-04-02 RX ADMIN — SODIUM CHLORIDE, SODIUM LACTATE, POTASSIUM CHLORIDE, CALCIUM CHLORIDE: 600; 310; 30; 20 INJECTION, SOLUTION INTRAVENOUS at 16:00:00

## 2018-04-02 RX ADMIN — SODIUM CHLORIDE, SODIUM LACTATE, POTASSIUM CHLORIDE, CALCIUM CHLORIDE: 600; 310; 30; 20 INJECTION, SOLUTION INTRAVENOUS at 13:08:00

## 2018-04-02 RX ADMIN — GLYCOPYRROLATE 1 MG: 0.2 INJECTION INTRAMUSCULAR; INTRAVENOUS at 15:54:00

## 2018-04-02 RX ADMIN — PHENYLEPHRINE HCL 100 MCG: 10 MG/ML VIAL (ML) INJECTION at 14:27:00

## 2018-04-02 RX ADMIN — GLYCOPYRROLATE 0.2 MG: 0.2 INJECTION INTRAMUSCULAR; INTRAVENOUS at 13:11:00

## 2018-04-02 RX ADMIN — SODIUM CHLORIDE, SODIUM LACTATE, POTASSIUM CHLORIDE, CALCIUM CHLORIDE: 600; 310; 30; 20 INJECTION, SOLUTION INTRAVENOUS at 15:21:00

## 2018-04-02 RX ADMIN — SODIUM CHLORIDE, SODIUM LACTATE, POTASSIUM CHLORIDE, CALCIUM CHLORIDE: 600; 310; 30; 20 INJECTION, SOLUTION INTRAVENOUS at 15:14:00

## 2018-04-02 RX ADMIN — DEXAMETHASONE SODIUM PHOSPHATE 4 MG: 4 MG/ML VIAL (ML) INJECTION at 15:56:00

## 2018-04-02 RX ADMIN — SODIUM CHLORIDE, SODIUM LACTATE, POTASSIUM CHLORIDE, CALCIUM CHLORIDE: 600; 310; 30; 20 INJECTION, SOLUTION INTRAVENOUS at 16:11:00

## 2018-04-02 RX ADMIN — LIDOCAINE HYDROCHLORIDE 100 MG: 10 INJECTION, SOLUTION EPIDURAL; INFILTRATION; INTRACAUDAL; PERINEURAL at 13:24:00

## 2018-04-02 RX ADMIN — ROCURONIUM BROMIDE 50 MG: 10 INJECTION, SOLUTION INTRAVENOUS at 13:25:00

## 2018-04-02 RX ADMIN — SODIUM CHLORIDE: 9 INJECTION, SOLUTION INTRAVENOUS at 16:15:00

## 2018-04-02 RX ADMIN — PHENYLEPHRINE HCL 100 MCG: 10 MG/ML VIAL (ML) INJECTION at 14:25:00

## 2018-04-02 RX ADMIN — SODIUM CHLORIDE: 9 INJECTION, SOLUTION INTRAVENOUS at 13:19:00

## 2018-04-02 RX ADMIN — LABETALOL HYDROCHLORIDE 5 MG: 5 INJECTION, SOLUTION INTRAVENOUS at 15:57:00

## 2018-04-02 RX ADMIN — LABETALOL HYDROCHLORIDE 5 MG: 5 INJECTION, SOLUTION INTRAVENOUS at 15:56:00

## 2018-04-02 RX ADMIN — PHENYLEPHRINE HCL 100 MCG: 10 MG/ML VIAL (ML) INJECTION at 14:33:00

## 2018-04-02 RX ADMIN — NEOSTIGMINE METHYLSULFATE 5 MG: 0.5 INJECTION INTRAVENOUS at 15:54:00

## 2018-04-02 RX ADMIN — EPHEDRINE SULFATE 2.5 MG: 50 INJECTION, SOLUTION INTRAVENOUS at 15:29:00

## 2018-04-02 RX ADMIN — ONDANSETRON 4 MG: 2 INJECTION INTRAMUSCULAR; INTRAVENOUS at 15:56:00

## 2018-04-02 NOTE — PHYSICAL THERAPY NOTE
Attempted to see patient for physical therapy evaluation. Patient having neurosurgery tomorrow per EMR, will attempt to see patient tomorrow when cleared by neurosurgery. RN aware.

## 2018-04-02 NOTE — ANESTHESIA PREPROCEDURE EVALUATION
Anesthesia PreOp Note    HPI:     Gunnar Akins is a 62year old male who presents for preoperative consultation requested by: Kasandra Norris MD    Date of Surgery: 3/31/2018 - 4/2/2018    Procedure(s):  CRANIOTOMY  Indication: Intracranial mass [R9 0.5 mg Intravenous Q6H PRN Brenda Maloney MD     Or         LORazepam (ATIVAN) injection 1 mg 1 mg Intravenous Q6H PRN Brenda Maloney MD     ipratropium-albuterol (DUONEB) nebulizer solution 3 mL 3 mL Nebulization TID Brenda Maloney MD     dexamethasone MPV 9.3 03/31/2018       Lab Results  Component Value Date    04/01/2018   K 4.0 04/01/2018    04/01/2018   CO2 24 04/01/2018   BUN 12 04/01/2018   CREATSERUM 0.82 04/01/2018    (H) 04/01/2018   CA 9.1 04/01/2018       Lab Results  Com

## 2018-04-02 NOTE — CONSULTS
Pulmonary/Critical Care Consult Note    HPI:   Daisy Thomsa is a 62year old male with Patient presents with:  Dizziness (neurologic)    Shannon Albert MD    Pt is a 61 yo with h/o of tob abuse and HL who presented with acute onset of dizzine Prochlorperazine Edisylate (COMPAZINE) injection 5 mg 5 mg Intravenous Once PRN   Naloxone HCl (NARCAN) 0.4 MG/ML injection 80 mcg 80 mcg Intravenous PRN   dextrose 50% injection 50 mL 50 mL Intravenous PRN   Glucose-Vitamin C (DEX-4) 4-0.006 g chewable infusion  Intravenous Continuous   [MAR Hold] levETIRAcetam (KEPPRA) 500 mg in sodium chloride 0.9 % 100 mL IVPB 500 mg Intravenous Q12H   [MAR Hold] atorvastatin (LIPITOR) tab 40 mg 40 mg Oral Nightly   [MAR Hold] diazepam (VALIUM) tab 5 mg 5 mg Oral Q6H now  Plan empiric nebs    HL  Plan restart statin when taking PO again    DVT px  SCDs          Rinku Gruber MD  4/2/2018  5:29 PM

## 2018-04-02 NOTE — PROGRESS NOTES
[facility]    Progress Note    Marco Antonio Farfan Patient Status:  Inpatient    4/3/1960 MRN [id number]   Location [facility] 2W/SW Attending Frankie Rodriguez MD   Hosp Day # 2 PCP Luciano Smith MD       Subjective:   Hasmukh Cazares treatment with the patient and his family.              Medications:     Current Facility-Administered Medications:  lactated ringers infusion  Intravenous Continuous   HYDROcodone-acetaminophen (NORCO) 5-325 MG per tab 1 tablet 1 tablet Oral PRN   Or nicotine (NICODERM CQ) 21 MG/24HR 1 patch 1 patch Transdermal Daily       Results:     Lab Results  Component Value Date   WBC 7.5 03/31/2018   HGB 15.5 03/31/2018   HCT 44.2 03/31/2018    03/31/2018   CREATSERUM 0.82 04/01/2018   BUN 12 04/01/201 possibilities including hemangiopericytoma or dural based metastatic disease amongst other etiologies. Dedicated neurosurgical assessment is recommended. 2. Mild approximate 2.5 mm of right-to-left midline shift.        Dictated by (CST): Hui Garcia MD

## 2018-04-02 NOTE — PROGRESS NOTES
[facility]    Progress Note    Carlos Padron Patient Status:  Inpatient    4/3/1960 MRN [id number]   Location [facility] 2W/SW Attending Delwin Claude, MD   Hosp Day # 2 PCP Mable Doan MD     Subjective:     Flower Nguyen appears well-developed and well-nourished. No distress. He is not intubated. HENT:   Head: Normocephalic and atraumatic. Mouth/Throat: Uvula is midline and oropharynx is clear and moist. Mucous membranes are not pale, not dry and not cyanotic.  He does submental, no submandibular, no preauricular, no posterior auricular and no occipital adenopathy present. He has no cervical adenopathy. Right cervical: No superficial cervical, no deep cervical and no posterior cervical adenopathy present. (L) 04/01/2018   INR 1.0 04/01/2018   T4F 0.87 04/01/2018   TSH 0.21 (L) 04/01/2018   LIP 11 (L) 03/31/2018   PSA 0.4 11/19/2016   TROP 0.00 03/31/2018   B12 427 04/01/2018       Xr Chest Pa + Lat Chest (cpt=71046)    Result Date: 4/1/2018  CONCLUSION:   N left internal carotid artery. 2. Antegrade flow in the vertebral arteries.      Dictated by (CST): Kash Dove MD on 4/01/2018 at 15:24     Approved by (CST): Kash Dove MD on 4/01/2018 at 15:26          Xr Chest Ap Portable  (cpt=71045)    Result

## 2018-04-02 NOTE — PLAN OF CARE
Problem: Patient/Family Goals  Goal: Patient/Family Long Term Goal  Patient's Long Term Goal: \"To get this mass taken care of.\"    Interventions:  -   - See additional Care Plan goals for specific interventions    Outcome: Progressing    Goal: Patient/Fa assistance with activity based on assessment  - Modify environment to reduce risk of injury  - Provide assistive devices as appropriate  - Consider OT/PT consult to assist with strengthening/mobility  - Encourage toileting schedule   Outcome: Progressing identified and integrated in the patient's plan of care  Interventions:  - What would you like us to know as we care for you?  Please involve pt and wife in plan of care  - Provide timely, complete, and accurate information to patient/family  - Incorporate

## 2018-04-02 NOTE — ANESTHESIA PROCEDURE NOTES
Arterial Line  Performed by: MICHAEL DOMINGUEZ  Authorized by: Myrtle Mancini     Procedure Start:  4/2/2018 2:00 PM  Procedure End:  4/2/2018 2:05 PM  Site Identification: surface landmarks    Patient Location:  OR  Indication: continuous blood pressure

## 2018-04-02 NOTE — OPERATIVE REPORT
NEUROSURGERY OPERATIVE NOTE    Surgery Date: 04/02/18  Patient Name: Moisés Kam  Patient MR#: [id number]  Surgeon: Dr. Lisa Raza  Co-Surgeon: None  Assistant: None    Anesthesia:  GETA  Length: 2 hr  Antibiotics: IV  Specimen: Tumor  EBL:  ~500c the anatomic keyhole and arch of the zygoma were identified. A sponge roll was placed behind the flap and hooks were used to hold the flap in place. Craniotomy and evacuation of hematoma: The high speed drill was used to make burrholes in the skull.   Marti Viveros

## 2018-04-02 NOTE — OCCUPATIONAL THERAPY NOTE
Orders received and chart reviewed. Per RN, patient scheduled for craniotomy today and not appropriate for OT until tomorrow (4/3) as medically appropriate. Will follow up w/ POC. RN aware.

## 2018-04-02 NOTE — ANESTHESIA POSTPROCEDURE EVALUATION
Patient: Amos Bhandari    Procedure Summary     Date:  04/02/18 Room / Location:  [address] [place] [address] / [address] [place] [address] MAIN OR    Anesthesia Start:  8094 Anesthesia Stop:      Procedure:  BCOQAJERXA (Right Head) Diagnosis:       Intracranial mass      (Intracrania

## 2018-04-02 NOTE — SLP NOTE
Pt remains NPO for anticipated surgery 4/3. Will follow post surgery as appropriate.  Thank you,  Carmen Pichardo MA, [address] Pathologist  [place]. [id number]

## 2018-04-03 ENCOUNTER — APPOINTMENT (OUTPATIENT)
Dept: CT IMAGING | Facility: HOSPITAL | Age: 58
DRG: 027 | End: 2018-04-03
Attending: NEUROLOGICAL SURGERY
Payer: COMMERCIAL

## 2018-04-03 ENCOUNTER — TELEPHONE (OUTPATIENT)
Dept: INTERNAL MEDICINE CLINIC | Facility: CLINIC | Age: 58
End: 2018-04-03

## 2018-04-03 PROCEDURE — 70450 CT HEAD/BRAIN W/O DYE: CPT | Performed by: NEUROLOGICAL SURGERY

## 2018-04-03 PROCEDURE — 99232 SBSQ HOSP IP/OBS MODERATE 35: CPT | Performed by: OTHER

## 2018-04-03 PROCEDURE — 99232 SBSQ HOSP IP/OBS MODERATE 35: CPT | Performed by: INTERNAL MEDICINE

## 2018-04-03 PROCEDURE — 99233 SBSQ HOSP IP/OBS HIGH 50: CPT | Performed by: INTERNAL MEDICINE

## 2018-04-03 RX ORDER — 0.9 % SODIUM CHLORIDE 0.9 %
VIAL (ML) INJECTION
Status: COMPLETED
Start: 2018-04-03 | End: 2018-04-03

## 2018-04-03 RX ORDER — HYDROCODONE BITARTRATE AND ACETAMINOPHEN 10; 325 MG/1; MG/1
1 TABLET ORAL EVERY 6 HOURS PRN
Status: DISCONTINUED | OUTPATIENT
Start: 2018-04-03 | End: 2018-04-05

## 2018-04-03 RX ORDER — DEXAMETHASONE SODIUM PHOSPHATE 4 MG/ML
4 VIAL (ML) INJECTION EVERY 8 HOURS
Status: DISCONTINUED | OUTPATIENT
Start: 2018-04-03 | End: 2018-04-03

## 2018-04-03 RX ORDER — 0.9 % SODIUM CHLORIDE 0.9 %
VIAL (ML) INJECTION
Status: COMPLETED
Start: 2018-04-03 | End: 2018-04-04

## 2018-04-03 RX ORDER — DEXAMETHASONE SODIUM PHOSPHATE 4 MG/ML
4 VIAL (ML) INJECTION EVERY 6 HOURS
Status: DISCONTINUED | OUTPATIENT
Start: 2018-04-03 | End: 2018-04-05

## 2018-04-03 NOTE — PROGRESS NOTES
[facility]    Neurosurgery Progress Note    Claudio Real Patient Status:  Inpatient    4/3/1960 MRN [id number]   Location [facility] 2W/SW Attending Loco Bullock MD   Hosp Day # 3 PCP Aldine Runner, MD Aloysius Radar ipratropium-albuterol (DUONEB) nebulizer solution 3 mL, 3 mL, Nebulization, TID  •  dexamethasone Sodium Phosphate (DECADRON) 4 MG/ML injection 6 mg, 6 mg, Oral, Q6H  •  levETIRAcetam (KEPPRA) 500 mg in sodium chloride 0.9 % 100 mL IVPB, 500 mg, Intravenou

## 2018-04-03 NOTE — OCCUPATIONAL THERAPY NOTE
OCCUPATIONAL THERAPY EVALUATION - INPATIENT     Room Number: 217/217-A  Evaluation Date: 4/3/2018  Type of Evaluation: Initial  Presenting Problem: intracranial mass s/p R frontal craniotomy    Physician Order: IP Consult to Occupational Therapy  Reason fo performing ADLs/IADLs. The patient is below baseline and would benefit from skilled inpatient OT to address the above deficits, maximizing patient's ability to return to prior level of function.     DISCHARGE RECOMMENDATIONS  OT Discharge Recommendations: WFL - within functional limits    VISION  Current Vision: wears glasses occasionally  Acuity:  able to read clock/calendar on wall without difficulty and able to read employee name badge without difficulty    PERCEPTION  Overall Perception Status:   [facility] met;Call light within reach;RN aware of session/findings; All patient questions and concerns addressed; Family present    OT Goals  Patients self stated goal is: return to PLOF     Patient will complete functional transfer with mod I.   Comment:     Patient w

## 2018-04-03 NOTE — SLP NOTE
SPEECH DAILY NOTE - INPATIENT    ASSESSMENT & PLAN   ASSESSMENT      Pt seen upright in bed with current diet of solid/thin liquids for monitoring of diet tolerance. Swallowing precautions/strategies discussed and demonstrated with Pt and Pt's family.  Mini quality) during this session. In Progress   Goal #2 The patient/family/caregiver will demonstrate understanding and implementation of aspiration precautions and swallow strategies independently over 2 session(s).     Reviewed swallowing precautions with

## 2018-04-03 NOTE — PROGRESS NOTES
[facility]    Progress Note    Gibran Quiroz Patient Status:  Inpatient    4/3/1960 MRN [id number]   Location [facility] 2W/SW Attending Pratibha Chandler MD   Hosp Day # 3 PCP Lucio Dejesus MD       Subjective:   R Facility-Administered Medications:  dexamethasone Sodium Phosphate (DECADRON) 4 MG/ML injection 4 mg 4 mg Oral Q8H   HYDROcodone-acetaminophen (NORCO)  MG per tab 1 tablet 1 tablet Oral Q6H PRN   dextrose 50% injection 50 mL 50 mL Intravenous PRN   G 04/01/2018    (H) 04/01/2018   CA 9.1 04/01/2018   ALB 3.8 04/01/2018   ALKPHO 49 04/01/2018   BILT 0.7 04/01/2018   TP 6.2 04/01/2018   AST 20 04/01/2018   ALT 15 (L) 04/01/2018   INR 1.0 04/01/2018   T4F 0.87 04/01/2018   TSH 0.21 (L) 04/01/2018

## 2018-04-03 NOTE — PAYOR COMM NOTE
--------------  CONTINUED STAY REVIEW    Payor: Isidro JARAMILLO O  Subscriber #:  [id number]  Authorization Number: [id number]    Admit date: 3/31/18  Admit time: 2131    Admitting Physician: Jamia Davis MD  Attending Physician:  Rikki Obrien (ANCEF/KEFZOL) 2 GM/20ML premix IV syringe 2 g     Date Action Dose Route User    4/3/2018 0500 New Bag 2 g Intravenous Linnea Varma, RN    4/2/2018 2005 New Bag 2 g Intravenous Linnea Varma, ISABELLA      dexamethasone Sodium Phosphate (DECADRON) 4 MG/M tablet     Date Action Dose Route User    4/3/2018 0120 Given 1 tablet Oral Emmanuel Varma, ISABELLA      HYDROcodone-acetaminophen [facility])  MG per tab 2 tablet     Date Action Dose Route User    4/3/2018 7223 Given 2 tablet Oral Harsh Baca RN Transdermal (Left Upper Arm) Yvonne Varma, RN      ondansetron HCl [facility]) injection 4 mg     Date Action Dose Route User    4/3/2018 0620 Given 4 mg Intravenous Tadeo Sin RN      ondansetron HCl [facility]) injection     Date Action Dose Route U

## 2018-04-03 NOTE — PHYSICAL THERAPY NOTE
PHYSICAL THERAPY EVALUATION - INPATIENT     Room Number: 217/217-A  Evaluation Date: 4/3/2018  Type of Evaluation: Initial   Physician Order: PT Eval and Treat    Presenting Problem: craniotomy  Reason for Therapy: Mobility Dysfunction and Discharge Plann Tobacco abuse        Past Surgical History  Past Surgical History:  2001: LAMINECTOMY    HOME SITUATION  Type of Home: House   Home Layout: One level  Stairs to Enter : 2  Railing: Yes  Stairs to Bedroom: 0  Railing: No    Lives With: Spouse (able to [employer] ABILITY STATUS  Gait Assessment   Gait Assistance: Not tested  Distance (ft): 0  Assistive Device: None     Stoop/Curb Assistance: Not tested       Bed Mobility: SBA    Transfers: SBA    Exercise/Education Provided:  Bed mobility  Body mechanics  Transfer

## 2018-04-03 NOTE — PLAN OF CARE
DISCHARGE PLANNING    • Discharge to home or other facility with appropriate resources Progressing        GASTROINTESTINAL - ADULT    • Minimal or absence of nausea and vomiting Progressing        NEUROLOGICAL - ADULT    • Achieves stable or improved neuro

## 2018-04-03 NOTE — SIGNIFICANT EVENT
Patient called RN because patient's left hand suddenly became \"stiff\". He was able to make a fist, but very hard to open his hand. Otherwise, patient neurologically within normal limits. Paged Neurosurgeon on call.  He ordered to increase frequency of Dec

## 2018-04-03 NOTE — PROGRESS NOTES
[facility] AND [facility]  [facility] Cardiology Progress Note    Alejadnro Phillips Patient Status:  Inpatient    4/3/1960 MRN [id number]   Location [facility] 2W/SW Attending Martin Pierre MD   Hosp Day # 3 PCP  MD Yaneli     62 year o constitutional distress. HEENT: Normocephalic, anicteric sclera, neck supple. Neck: No JVD, carotids 2+, no bruits. Cardiac: Regular rate and rhythm.  S1, S2 normal. No murmur, pericardial rub, S3.  Lungs: Clear without wheezes, rales, rhonchi or dullnes

## 2018-04-03 NOTE — PROGRESS NOTES
[facility]    Progress Note      Assessment and Plan:   1. Status post intracranial mass resection–likely meningioma. Recommendations: Okay to floor transfer, pain control, await final pathology, as per neurosurgery.     2.  Tobacco use

## 2018-04-03 NOTE — PROGRESS NOTES
[facility]    Progress Note    Claudio Real Patient Status:  Inpatient    4/3/1960 MRN [id number]   Location [facility] 2W/SW Attending Loco Bullock MD   Hosp Day # 3 PCP Aldine Runner, MD     Subjective: distension. There is no tenderness. There is no rigidity, no rebound, no guarding and no CVA tenderness. Neurological: He is alert and oriented to person, place, and time. He displays no atrophy and no tremor. No cranial nerve deficit.  He exhibits normal 4/01/2018 at 13:44     Approved by (CST): Adonis Tesfaye MD on 4/01/2018 at 13:45          Ct Brain Or Head ([id number])    Result Date: 4/3/2018  CONCLUSION:   Postoperative changes of resection of right frontal mass with postoperative gas and blood byproduct

## 2018-04-03 NOTE — TELEPHONE ENCOUNTER
Wife called needing a letter to [facility] binder at fax #[phone] phone number at [phone] regarding a work release for patient since he is in the hospital with a brain mass and status post resection of the brain mass. LMTCB.

## 2018-04-04 ENCOUNTER — APPOINTMENT (OUTPATIENT)
Dept: CT IMAGING | Facility: HOSPITAL | Age: 58
DRG: 027 | End: 2018-04-04
Attending: NEUROLOGICAL SURGERY
Payer: COMMERCIAL

## 2018-04-04 PROCEDURE — 99232 SBSQ HOSP IP/OBS MODERATE 35: CPT | Performed by: OTHER

## 2018-04-04 PROCEDURE — 99232 SBSQ HOSP IP/OBS MODERATE 35: CPT | Performed by: INTERNAL MEDICINE

## 2018-04-04 PROCEDURE — 70450 CT HEAD/BRAIN W/O DYE: CPT | Performed by: NEUROLOGICAL SURGERY

## 2018-04-04 RX ORDER — IPRATROPIUM BROMIDE AND ALBUTEROL SULFATE 2.5; .5 MG/3ML; MG/3ML
3 SOLUTION RESPIRATORY (INHALATION)
Status: DISCONTINUED | OUTPATIENT
Start: 2018-04-04 | End: 2018-04-05

## 2018-04-04 RX ORDER — 0.9 % SODIUM CHLORIDE 0.9 %
VIAL (ML) INJECTION
Status: COMPLETED
Start: 2018-04-04 | End: 2018-04-04

## 2018-04-04 NOTE — PROGRESS NOTES
[facility]    Progress Note    Sugey Fung Patient Status:  Inpatient    4/3/1960 MRN [id number]   Location [facility] 4W/SW/SE Attending Bailey Lentz MD   Hosp Day # 4 PCP Fred Mary MD     Subjective: right frontal craniotomy with meningioma resection. 2. Edema and evolving intraparenchymal hemorrhage are noted the resection cavity. There is localized mass effect with right-to-left midline shift of approximately 0.2 cm, unchanged.   3. Postoperative pne

## 2018-04-04 NOTE — PROGRESS NOTES
[facility]    Progress Note    Abby Sosa Patient Status:  Inpatient    4/3/1960 MRN [id number]   Location [facility] 4W/SW/SE Attending Gilson Rosales MD   Hosp Day # 4 PCP Ro Dumont MD       Subjective: Thank you very much.              Medications:     Current Facility-Administered Medications:  ipratropium-albuterol (DUONEB) nebulizer solution 3 mL 3 mL Nebulization 2 times daily   HYDROcodone-acetaminophen (NORCO)  MG per tab 1 tablet 1 tablet Lauren Bend 0.21 (L) 04/01/2018   LIP 11 (L) 03/31/2018   PSA 0.4 11/19/2016   TROP 0.00 03/31/2018   B12 427 04/01/2018       Ct Brain Or Head ([id number])    Result Date: 4/4/2018  CONCLUSION:  1. Status post right frontal craniotomy with meningioma resection.   2. Edema

## 2018-04-04 NOTE — PROGRESS NOTES
[facility]    Progress Note    Jocelyn Ornelas Patient Status:  Inpatient    4/3/1960 MRN [id number]   Location [facility] 4W/SW/SE Attending Dontae Tran MD   Hosp Day # 4 PCP Omkar Arevalo MD     Subjective: meningioma resection. 2. Edema and evolving intraparenchymal hemorrhage are noted the resection cavity. There is localized mass effect with right-to-left midline shift of approximately 0.2 cm, unchanged.   3. Postoperative pneumocephalus and subdural fluid

## 2018-04-04 NOTE — OCCUPATIONAL THERAPY NOTE
OCCUPATIONAL THERAPY TREATMENT NOTE - INPATIENT        Room Number: 473/473-A           Presenting Problem: intracranial mass s/p R frontal craniotomy    Problem List  Principal Problem:    Intracranial mass  Active Problems:    Dizziness    Hyperglycemia OF DAILY LIVING ASSESSMENT  AM-PAC ‘6-Clicks’ Inpatient Daily Activity Short Form  How much help from another person does the patient currently need…  -   Putting on and taking off regular lower body clothing?: None  -   Bathing (including washing, rinsing

## 2018-04-04 NOTE — CM/SW NOTE
Plan at this time is for the pt. To discharge home when medically stable with family. SW will continue to follow and assist as needed.       Hernesto Argueta [facility] ext [phone]

## 2018-04-04 NOTE — PROGRESS NOTES
S: Has intermittant left hand clumsiness/stiffness    O:    04/04/18  0500   BP: 126/63   Pulse: 75   Resp: 18   Temp: 98.7 °F (37.1 °C)       A&Ox3  Speech fluent  Motor 5/5 BUE, BLE, mild clumsiness left hand    Ct Brain Or Head ([id number])    Result Date: 4

## 2018-04-04 NOTE — PAYOR COMM NOTE
--------------  CONTINUED STAY REVIEW    Payor: Abena JARAMILLO PPO  Subscriber #:  [id number]  Authorization Number: [id number]    Admit date: 3/31/18  Admit time: 2131    Admitting Physician: Hi Fam MD  Attending Physician:  Cruzito Banks physical therapy for rehab. He denies any other neurologic complaints. Thank you very much.                  Medications:      Current Facility-Administered Medications:  ipratropium-albuterol (DUONEB) nebulizer solution 3 mL 3 mL Nebulization 2 times codie 04/01/2018   ALT 15 (L) 04/01/2018   INR 1.0 04/01/2018   T4F 0.87 04/01/2018   TSH 0.21 (L) 04/01/2018   LIP 11 (L) 03/31/2018   PSA 0.4 11/19/2016   TROP 0.00 03/31/2018   B12 427 04/01/2018         Ct Brain Or Head ([id number])     Result Date: 4/4/2018  CON Intravenous Anastacia Sands RN    4/3/2018 1834 Given 4 mg Intravenous Maddie Delgado, RN      Fluticasone Furoate-Vilanterol (BREO ELLIPTA) 200-25 MCG/INH inhaler 1 puff     Date Action Dose Route User    4/4/2018 0930 Given 1 puff Inhalation Th Date Action Dose Route User    4/4/2018 0000 Given (none) (none) Navid Person RN      Sodium Chloride 0.9 % solution     Date Action Dose Route User    4/4/2018 0538 Given 10 mL (none) Angel GREGORIO RN      Sodium Chloride 0.9 % solution     D

## 2018-04-04 NOTE — PHYSICAL THERAPY NOTE
PHYSICAL THERAPY TREATMENT NOTE - INPATIENT     Room Number: 473/473-A       Presenting Problem: craniotomy    Problem List  Principal Problem:    Intracranial mass  Active Problems:    Dizziness    Hyperglycemia      ASSESSMENT   Consulted w/ RN prior to mmHg    AM-PAC '6-Clicks' INPATIENT SHORT FORM - BASIC MOBILITY  How much difficulty does the patient currently have. ..  -   Turning over in bed (including adjusting bedclothes, sheets and blankets)?: A Little   -   Sitting down on and standing up from a c activity/exercise instructions provided to patient in preparation for discharge.    Goal #5   Current Status IN PROGRESS   Goal #6     Goal #6  Current Status

## 2018-04-04 NOTE — PROGRESS NOTES
Pulmonary/Critical Care Follow Up Note    HPI:   Lenard Gomez is a 62year old male with Patient presents with:  Dizziness (neurologic)      PCP Sara Betts MD  Admission Attending Aries Boucher [facility] Day #4    Occ wheeze noted Daily      Allergies:    Morphine                Nausea and vomiting        PHYSICAL EXAM:   Blood pressure 126/63, pulse 75, temperature 98.7 °F (37.1 °C), temperature source Oral, resp.  rate 18, height 5' 7\" (1.702 m), weight 149 lb 3.2 oz (67.7 kg), Sp

## 2018-04-05 VITALS
SYSTOLIC BLOOD PRESSURE: 130 MMHG | HEART RATE: 72 BPM | BODY MASS INDEX: 23.42 KG/M2 | OXYGEN SATURATION: 96 % | WEIGHT: 149.19 LBS | RESPIRATION RATE: 18 BRPM | DIASTOLIC BLOOD PRESSURE: 65 MMHG | TEMPERATURE: 98 F | HEIGHT: 67 IN

## 2018-04-05 PROCEDURE — 99232 SBSQ HOSP IP/OBS MODERATE 35: CPT | Performed by: INTERNAL MEDICINE

## 2018-04-05 PROCEDURE — 99232 SBSQ HOSP IP/OBS MODERATE 35: CPT | Performed by: OTHER

## 2018-04-05 PROCEDURE — 99239 HOSP IP/OBS DSCHRG MGMT >30: CPT | Performed by: INTERNAL MEDICINE

## 2018-04-05 RX ORDER — HYDROCODONE BITARTRATE AND ACETAMINOPHEN 10; 325 MG/1; MG/1
1 TABLET ORAL EVERY 6 HOURS PRN
Qty: 30 TABLET | Refills: 0 | Status: SHIPPED | OUTPATIENT
Start: 2018-04-05 | End: 2018-04-23

## 2018-04-05 RX ORDER — LEVETIRACETAM 500 MG/1
500 TABLET ORAL 2 TIMES DAILY
Qty: 28 TABLET | Refills: 0 | Status: SHIPPED | OUTPATIENT
Start: 2018-04-05 | End: 2018-04-19

## 2018-04-05 RX ORDER — ALBUTEROL SULFATE 90 UG/1
1 AEROSOL, METERED RESPIRATORY (INHALATION) 4 TIMES DAILY
Qty: 1 INHALER | Refills: 3 | Status: SHIPPED | OUTPATIENT
Start: 2018-04-05 | End: 2018-04-06

## 2018-04-05 RX ORDER — ALBUTEROL SULFATE 90 UG/1
1 AEROSOL, METERED RESPIRATORY (INHALATION) 4 TIMES DAILY
Status: DISCONTINUED | OUTPATIENT
Start: 2018-04-05 | End: 2018-04-05

## 2018-04-05 RX ORDER — 0.9 % SODIUM CHLORIDE 0.9 %
VIAL (ML) INJECTION
Status: COMPLETED
Start: 2018-04-05 | End: 2018-04-05

## 2018-04-05 RX ORDER — DEXAMETHASONE 2 MG/1
TABLET ORAL
Qty: 98 TABLET | Refills: 0 | Status: SHIPPED | OUTPATIENT
Start: 2018-04-05 | End: 2018-05-14

## 2018-04-05 RX ORDER — NICOTINE 21 MG/24HR
1 PATCH, TRANSDERMAL 24 HOURS TRANSDERMAL DAILY
Qty: 30 PATCH | Refills: 3 | Status: SHIPPED | OUTPATIENT
Start: 2018-04-05 | End: 2018-04-23

## 2018-04-05 NOTE — PROGRESS NOTES
S: No complaints    O:    04/05/18  0623   BP: 130/65   Pulse: 72   Resp: 18   Temp: 97.6 °F (36.4 °C)       A&Ox3  Motor 5/5 x 4  Wound CDi      Ct Brain Or Head ([id number])    Result Date: 4/4/2018  CONCLUSION:  1.  Status post right frontal craniotomy with m

## 2018-04-05 NOTE — DISCHARGE SUMMARY
DISCHARGE SUMMARY     Alicia Perry Patient Status:  Inpatient    4/3/1960 MRN [id number]   Location [facility] 4W/SW/SE Attending Erasmo Storm MD   [facility] Day # 5 PCP Matilda Monroe MD     Date of Admission: 3/31/2018  Date of D the left hand but this is actually better than yesterday    Procedures during hospitalization:   • Craniotomy with resection of meningioma on the right    Incidental or significant findings and recommendations (brief descriptions):  • Wheezing, possible em MG Tabs  · levETIRAcetam 500 MG Tabs  · nicotine 21 MG/24HR Pt24     Please  your prescriptions at the location directed by your doctor or nurse    Bring a paper prescription for each of these medications  · HYDROcodone-acetaminophen  MG Tabs He clearly has pain from his surgery but this should significantly improve over time. Explained to him the rationale behind limiting narcotic use in the postoperative period and attempting to wean as quickly as possible. He has wife are very receptive.

## 2018-04-05 NOTE — PROGRESS NOTES
[facility] [place] [facility]    Progress Note    Aram Bauer Patient Status:  Inpatient    4/3/1960 MRN [id number]   Location [facility] 4W/SW/SE Attending Anamika Henley MD   Hosp Day # 5 PCP Kristal Badillo MD       Subjective: 500 mg twice daily. Follow-up in my office in 6-8 weeks. I discussed the plan with the patient and his wife. Thank you very much.            Medications:     Current Facility-Administered Medications:  Sodium Chloride 0.9 % solution      Albuterol Sulfat 04/01/2018    (H) 04/01/2018   CA 9.1 04/01/2018   ALB 3.8 04/01/2018   ALKPHO 49 04/01/2018   BILT 0.7 04/01/2018   TP 6.2 04/01/2018   AST 20 04/01/2018   ALT 15 (L) 04/01/2018   INR 1.0 04/01/2018   T4F 0.87 04/01/2018   TSH 0.21 (L) 04/01/2018

## 2018-04-05 NOTE — SLP NOTE
SPEECH DAILY NOTE - INPATIENT    ASSESSMENT & PLAN   ASSESSMENT    Pt seen upright in chair with current diet of solid/thin liquids (breakfast tray) for monitoring of diet tolerance.  Swallowing precautions/strategies discussed and demonstrated with Pt and demonstrate understanding and implementation of aspiration precautions and swallow strategies independently over 2 session(s). Reviewed swallowing precautions with Pt and family. Good understanding observed.        GOAL MET     Goal #3 The patient will u

## 2018-04-05 NOTE — PAYOR COMM NOTE
--------------  CONTINUED STAY REVIEW    Payor: Juancho JARAMILLO O  Subscriber #:  [id number]  Authorization Number: [id number]    Admit date: 3/31/18  Admit time: 2131    Admitting Physician: Benjamin Parnell MD  Attending Physician:  Catalino Carlson dexamethasone Sodium Phosphate (DECADRON) 4 MG/ML injection 4 mg     Date Action Dose Route User    4/5/2018 0618 Given 4 mg Intravenous Zaina Tavera RN    4/5/2018 0035 Given 4 mg Intravenous Zaina Tavera RN    4/4/2018 1830 Given 4 mg Int Sodium Chloride 0.9 % solution     Date Action Dose Route User    4/4/2018 1407 Given 10 mL (none) Fabián Keys, RN      Sodium Chloride 0.9 % solution     Date Action Dose Route User    4/4/2018 2136 Given 10 mL (none) Morgan Pinzon RN      S

## 2018-04-05 NOTE — PROGRESS NOTES
Pulmonary/Critical Care Follow Up Note    HPI:   Shanna Casey is a 62year old male with Patient presents with:  Dizziness (neurologic)      PCP Brayden Groves MD  Admission Attending Lisa Lawler 15 Day #5  No sob  No wheeze  + (NICODERM CQ) 21 MG/24HR 1 patch, 1 patch, Transdermal, Daily      Allergies:    Morphine                Nausea and vomiting        PHYSICAL EXAM:   Blood pressure 130/65, pulse 72, temperature 97.6 °F (36.4 °C), temperature source Oral, resp.  rate 18, hei

## 2018-04-05 NOTE — PHYSICAL THERAPY NOTE
PHYSICAL THERAPY TREATMENT NOTE - INPATIENT     Room Number: 473/473-A       Presenting Problem: craniotomy    Problem List  Principal Problem:    Intracranial mass  Active Problems:    Dizziness    Hyperglycemia      ASSESSMENT   Consulted w/ RN prior to etc.): None   -   Moving from lying on back to sitting on the side of the bed?: None   How much help from another person does the patient currently need. ..   -   Moving to and from a bed to a chair (including a wheelchair)?: None   -   Need to walk in hosp

## 2018-04-06 ENCOUNTER — TELEPHONE (OUTPATIENT)
Dept: INTERNAL MEDICINE CLINIC | Facility: CLINIC | Age: 58
End: 2018-04-06

## 2018-04-06 ENCOUNTER — PATIENT OUTREACH (OUTPATIENT)
Dept: CASE MANAGEMENT | Age: 58
End: 2018-04-06

## 2018-04-06 RX ORDER — ALBUTEROL SULFATE 90 UG/1
2 AEROSOL, METERED RESPIRATORY (INHALATION) EVERY 6 HOURS PRN
Qty: 1 INHALER | Refills: 3 | Status: SHIPPED | OUTPATIENT
Start: 2018-04-06 | End: 2021-02-27

## 2018-04-06 NOTE — PAYOR COMM NOTE
--------------  DISCHARGE REVIEW    Payor: Prema Baseman [employer] P[employer]  Subscriber #:  [id number]  Authorization Number: [id number]    Admit date: 3/31/18  Admit time:  2131  Discharge Date: 4/5/2018 12:33 PM     Admitting Physician: Homar Griffin MD  Attend his cigarette use. He was seen by pulmonary and started on inhaled bronchodilators with significant improvement. Patient had some left-sided weakness and incoordination. This improved with time.   He had moderate headache and was taking significant amoun CONTINUE taking these medications      Instructions Prescription details   atorvastatin 40 MG Tabs  Commonly known as:  LIPITOR      TAKE 1 TABLET(40 MG) BY MOUTH EVERY NIGHT   Quantity:  90 tablet  Refills:  0        STOP taking these medications    asp quickly as possible. He has wife are very receptive. I advised him that he should take no more than 3000 mg total of Tylenol in any 24-hour period. He may take extra strength Tylenol as needed and then Norco if the pain is worse.   He may want to reserve

## 2018-04-06 NOTE — PROGRESS NOTES
TCM OUTREACH    Call made to attempt to reach patient for TCM follow up. No answer, Voicemail left requesting call back at [phone].     (Triage Team if pt returns call please transfer to ext [phone])

## 2018-04-06 NOTE — PROGRESS NOTES
TCM OUTREACH    Call made to attempt to reach patient for TCM follow up. No answer on mobile #, Voicemail left requesting call back at [phone].     (Triage Team if pt returns call please transfer to ext [phone])

## 2018-04-06 NOTE — TELEPHONE ENCOUNTER
pharmacy does not cover proventil hfa, they will cover pro air hfa--ventolin hfa--or pro air respiclick.  Please advise

## 2018-04-10 NOTE — PROGRESS NOTES
Initial Post Discharge Follow Up   Discharge Date: 4/5/18  Contact Date: 4/6/2018    Consent Verification:  Assessment Completed With: Patient  Spouse: Sarah Permission received per patient?  written  HIPAA Verified? Yes    Discharge Dx:    Intracranial tablet Rfl: 0   nicotine 21 MG/24HR Transdermal Patch 24 Hr Place 1 patch onto the skin daily. Disp: 30 patch Rfl: 3   levETIRAcetam (KEPPRA) 500 MG Oral Tab Take 1 tablet (500 mg total) by mouth 2 (two) times daily.  Disp: 28 tablet Rfl: 0   dexamethasone hospital?  excellent      Interventions by [facility]: TCm outreach made w pt and his wife Sarah. Pt and wife state they are going well.  Reports pt to have headaches rated 6 on pain scale but well managed w PRN tylenol and Norco. Pt and wife aware they are to sta

## 2018-04-13 ENCOUNTER — OFFICE VISIT (OUTPATIENT)
Dept: INTERNAL MEDICINE CLINIC | Facility: CLINIC | Age: 58
End: 2018-04-13

## 2018-04-13 VITALS
HEIGHT: 68 IN | SYSTOLIC BLOOD PRESSURE: 146 MMHG | DIASTOLIC BLOOD PRESSURE: 80 MMHG | HEART RATE: 67 BPM | OXYGEN SATURATION: 97 % | WEIGHT: 151.38 LBS | BODY MASS INDEX: 22.94 KG/M2 | TEMPERATURE: 98 F

## 2018-04-13 DIAGNOSIS — R05.9 COUGH: ICD-10-CM

## 2018-04-13 DIAGNOSIS — D32.9 MENINGIOMA (HCC): Primary | ICD-10-CM

## 2018-04-13 PROCEDURE — 99495 TRANSJ CARE MGMT MOD F2F 14D: CPT | Performed by: INTERNAL MEDICINE

## 2018-04-13 PROCEDURE — 1111F DSCHRG MED/CURRENT MED MERGE: CPT | Performed by: INTERNAL MEDICINE

## 2018-04-13 RX ORDER — BUPROPION HYDROCHLORIDE 150 MG/1
150 TABLET, EXTENDED RELEASE ORAL 2 TIMES DAILY
Qty: 60 TABLET | Refills: 3 | Status: SHIPPED | OUTPATIENT
Start: 2018-04-13 | End: 2018-09-24

## 2018-04-13 RX ORDER — AZITHROMYCIN 250 MG/1
TABLET, FILM COATED ORAL
Qty: 6 TABLET | Refills: 0 | Status: SHIPPED | OUTPATIENT
Start: 2018-04-13 | End: 2018-04-23 | Stop reason: ALTCHOICE

## 2018-04-13 NOTE — PROGRESS NOTES
HPI:    Viet Nunes is a 62year old male here today for hospital follow up.    He was discharged from Inpatient hospital, [facility]  to Home   Admission Date: 3/31/18   Discharge Date: 4/5/18  Hospital Discharge Diagnoses (since 3/14/2018 then 1 pill every 6 hours for 1 week and then 1/2 pill every 6 hours for 1 week   atorvastatin 40 MG Oral Tab TAKE 1 TABLET(40 MG) BY MOUTH EVERY NIGHT     No current facility-administered medications on file prior to visit.        HISTORY: reconciled and r Normocephalic and atraumatic. Mouth/Throat: No pharynx erythema. Cardiovascular: Normal rate and regular rhythm. Edema (trace) present. Pulmonary/Chest: Effort normal and breath sounds normal.   Neurological: He is alert.      ASSESSMENT/ PLAN:   Diag

## 2018-04-13 NOTE — PATIENT INSTRUCTIONS
ASSESSMENT/ PLAN:   Diagnoses and all orders for this visit:    Meningioma [facility])  Patient overall recovering but still with some pain and weakness LUE. Recommend he finish the keppra and decadron.  f/u with surgeon on Monday and discuss the limitations of h

## 2018-04-23 ENCOUNTER — OFFICE VISIT (OUTPATIENT)
Dept: INTERNAL MEDICINE CLINIC | Facility: CLINIC | Age: 58
End: 2018-04-23

## 2018-04-23 ENCOUNTER — TELEPHONE (OUTPATIENT)
Dept: INTERNAL MEDICINE CLINIC | Facility: CLINIC | Age: 58
End: 2018-04-23

## 2018-04-23 VITALS
DIASTOLIC BLOOD PRESSURE: 74 MMHG | WEIGHT: 159 LBS | HEIGHT: 68 IN | BODY MASS INDEX: 24.1 KG/M2 | SYSTOLIC BLOOD PRESSURE: 127 MMHG | HEART RATE: 82 BPM | OXYGEN SATURATION: 95 % | TEMPERATURE: 98 F

## 2018-04-23 DIAGNOSIS — R60.0 LOCALIZED EDEMA: Primary | ICD-10-CM

## 2018-04-23 PROCEDURE — 36415 COLL VENOUS BLD VENIPUNCTURE: CPT | Performed by: INTERNAL MEDICINE

## 2018-04-23 PROCEDURE — 99213 OFFICE O/P EST LOW 20 MIN: CPT | Performed by: INTERNAL MEDICINE

## 2018-04-23 PROCEDURE — 99212 OFFICE O/P EST SF 10 MIN: CPT | Performed by: INTERNAL MEDICINE

## 2018-04-23 RX ORDER — TRIAMTERENE AND HYDROCHLOROTHIAZIDE 37.5; 25 MG/1; MG/1
1 TABLET ORAL DAILY
Qty: 30 TABLET | Refills: 0 | Status: SHIPPED | OUTPATIENT
Start: 2018-04-23 | End: 2018-05-14

## 2018-04-23 NOTE — TELEPHONE ENCOUNTER
Patient states he has bilateral swollen feet and knees he is asking if you want to prescribe water pills or what else he can do .  He recent ly had surgery

## 2018-04-23 NOTE — PATIENT INSTRUCTIONS
ASSESSMENT/PLAN:   Diagnoses and all orders for this visit:    Localized edema    patient with tremendous swelling, likely related to the motrin. Also causing heartburn. Needs to stop the motrin. Will check blood and urine testing.  Will give low dose of a

## 2018-04-23 NOTE — PROGRESS NOTES
HPI:    Patient ID: Marivel Hernandez is a 62year old male. HPI  Edema  Patient had brain surgery for meningioma. Had mild swelling last week, watching the salt, now for the last 3 days he has had increasing edema which is significant.  Patient tape visit:    Localized edema    patient with tremendous swelling, likely related to the motrin. Also causing heartburn. Needs to stop the motrin. Will check blood and urine testing. Will give low dose of a diuretic.          BT#[id number]

## 2018-04-23 NOTE — TELEPHONE ENCOUNTER
Attn Dr. Gisela Ocasio, received a call from this patient's spouse who claims her  is experiencing progressive worsening edema affecting both lower extremities, abdomen and face.  Patient denies any dyspnea, anginal pains, lethargy or weakness at the conrad

## 2018-05-14 ENCOUNTER — OFFICE VISIT (OUTPATIENT)
Dept: INTERNAL MEDICINE CLINIC | Facility: CLINIC | Age: 58
End: 2018-05-14

## 2018-05-14 VITALS
SYSTOLIC BLOOD PRESSURE: 133 MMHG | TEMPERATURE: 98 F | HEIGHT: 67 IN | DIASTOLIC BLOOD PRESSURE: 81 MMHG | BODY MASS INDEX: 23.07 KG/M2 | HEART RATE: 79 BPM | WEIGHT: 147 LBS

## 2018-05-14 DIAGNOSIS — R03.0 ELEVATED BLOOD PRESSURE READING: Primary | ICD-10-CM

## 2018-05-14 DIAGNOSIS — D32.9 MENINGIOMA (HCC): ICD-10-CM

## 2018-05-14 PROBLEM — R42 DIZZINESS: Status: RESOLVED | Noted: 2018-03-31 | Resolved: 2018-05-14

## 2018-05-14 PROCEDURE — 99212 OFFICE O/P EST SF 10 MIN: CPT | Performed by: INTERNAL MEDICINE

## 2018-05-14 NOTE — PATIENT INSTRUCTIONS
ASSESSMENT/PLAN:   Elevated blood pressure reading  BP doing very well. Swelling resolved after stopping the motrin    Meningioma [facility])  Patient overall doing very well.  Tapering meds from the neurosurgeon

## 2018-05-14 NOTE — PROGRESS NOTES
HPI:    Patient ID: Marco Antonio Farfan is a 62year old male. HPI  Edema  Patient much better off the ibuprofen and swelling now resolved. Stopped the diuretic as the swelling is fine.      Smoking  Patient still smoking some, 10 cigs daily, overall m

## 2018-05-17 ENCOUNTER — OFFICE VISIT (OUTPATIENT)
Dept: PHYSICAL THERAPY | Age: 58
End: 2018-05-17
Attending: NEUROLOGICAL SURGERY
Payer: COMMERCIAL

## 2018-05-17 PROCEDURE — 97110 THERAPEUTIC EXERCISES: CPT | Performed by: PHYSICAL THERAPIST

## 2018-05-17 PROCEDURE — 97161 PT EVAL LOW COMPLEX 20 MIN: CPT | Performed by: PHYSICAL THERAPIST

## 2018-05-17 NOTE — PROGRESS NOTES
P.T. EVALUATION:   Referring Physician: Dr. Nena Henry  Diagnosis: Brain Meningioma (ICD-D32.0)  Craniotomy    Date of Onset: 4/2/2018 Date of Service: 5/17/2018     PATIENT SUMMARY   Daisy Thomas is a 62year old y/o male who presents to therapy today Dynamic Gait Index score: 24/24    Today’s Treatment and Response:  Patient education provided on PT eval findings, treatment plan, goals.   Patient received today:  PT Eval Low Complexity   Therapeutic Exercises: Shuttle: double and single LE extensions,

## 2018-05-20 RX ORDER — TRIAMTERENE AND HYDROCHLOROTHIAZIDE 37.5; 25 MG/1; MG/1
1 TABLET ORAL DAILY
Qty: 30 TABLET | Refills: 0 | OUTPATIENT
Start: 2018-05-20

## 2018-05-21 ENCOUNTER — OFFICE VISIT (OUTPATIENT)
Dept: PHYSICAL THERAPY | Age: 58
End: 2018-05-21
Attending: NEUROLOGICAL SURGERY
Payer: COMMERCIAL

## 2018-05-21 PROCEDURE — 97110 THERAPEUTIC EXERCISES: CPT | Performed by: PHYSICAL THERAPIST

## 2018-05-21 NOTE — PROGRESS NOTES
Diagnosis: Brain Meningioma (ICD-D32.0)  Craniotomy         Next MD visit: none scheduled  Fall Risk: standard         Precautions: n/a          Medication Changes since last visit?: No    Subjective: States he was a little sore after his initial visit.  Re

## 2018-05-23 ENCOUNTER — OFFICE VISIT (OUTPATIENT)
Dept: PHYSICAL THERAPY | Age: 58
End: 2018-05-23
Attending: NEUROLOGICAL SURGERY
Payer: COMMERCIAL

## 2018-05-23 PROCEDURE — 97110 THERAPEUTIC EXERCISES: CPT

## 2018-05-23 NOTE — PROGRESS NOTES
Diagnosis: Brain Meningioma (ICD-D32.0)  Craniotomy         Next MD visit: none scheduled  Fall Risk: standard         Precautions: n/a          Medication Changes since last visit?: No    Subjective: Pt states he was using weed cutters today and looking d

## 2018-05-30 ENCOUNTER — OFFICE VISIT (OUTPATIENT)
Dept: PHYSICAL THERAPY | Age: 58
End: 2018-05-30
Attending: NEUROLOGICAL SURGERY
Payer: COMMERCIAL

## 2018-05-30 PROCEDURE — 97110 THERAPEUTIC EXERCISES: CPT | Performed by: PHYSICAL THERAPIST

## 2018-05-30 NOTE — PROGRESS NOTES
Diagnosis: Brain Meningioma (ICD-D32.0)  Craniotomy         Next MD visit: none scheduled  Fall Risk: standard         Precautions: n/a          Medication Changes since last visit?: No    Subjective: Pt states he is slowly feeling better.  Still complains

## 2018-06-04 ENCOUNTER — OFFICE VISIT (OUTPATIENT)
Dept: PHYSICAL THERAPY | Age: 58
End: 2018-06-04
Attending: NEUROLOGICAL SURGERY
Payer: COMMERCIAL

## 2018-06-04 PROCEDURE — 97110 THERAPEUTIC EXERCISES: CPT

## 2018-06-04 NOTE — PROGRESS NOTES
Diagnosis: Brain Meningioma (ICD-D32.0)  Craniotomy         Next MD visit: none scheduled  Fall Risk: standard         Precautions: n/a          Medication Changes since last visit?: No    Subjective: Pt states he is slowly feeling better.  He still feels l

## 2018-06-06 ENCOUNTER — OFFICE VISIT (OUTPATIENT)
Dept: PHYSICAL THERAPY | Age: 58
End: 2018-06-06
Attending: NEUROLOGICAL SURGERY
Payer: COMMERCIAL

## 2018-06-06 PROCEDURE — 97110 THERAPEUTIC EXERCISES: CPT

## 2018-06-08 ENCOUNTER — OFFICE VISIT (OUTPATIENT)
Dept: PHYSICAL THERAPY | Age: 58
End: 2018-06-08
Attending: NEUROLOGICAL SURGERY
Payer: COMMERCIAL

## 2018-06-08 PROCEDURE — 97110 THERAPEUTIC EXERCISES: CPT

## 2018-06-08 NOTE — PROGRESS NOTES
Diagnosis: Brain Meningioma (ICD-D32.0)  Craniotomy         Next MD visit: August 13, 2018  Fall Risk: standard         Precautions: n/a          Medication Changes since last visit?: No    Subjective: Pt reports he feels like the therapy is helping.  Pt st

## 2018-06-10 NOTE — TELEPHONE ENCOUNTER
Protocol failed due to labs.     Cholesterol Medications  Protocol Criteria:  · Appointment scheduled in the past 12 months or in the next 3 months  · ALT & LDL on file in the past 12 months  · ALT result < 80  · LDL result <130   Recent Outpatient Visits

## 2018-06-11 ENCOUNTER — OFFICE VISIT (OUTPATIENT)
Dept: PHYSICAL THERAPY | Age: 58
End: 2018-06-11
Attending: NEUROLOGICAL SURGERY
Payer: COMMERCIAL

## 2018-06-11 PROCEDURE — 97110 THERAPEUTIC EXERCISES: CPT

## 2018-06-11 RX ORDER — ATORVASTATIN CALCIUM 40 MG/1
TABLET, FILM COATED ORAL
Qty: 90 TABLET | Refills: 3 | Status: SHIPPED | OUTPATIENT
Start: 2018-06-11 | End: 2019-04-23

## 2018-06-11 NOTE — PROGRESS NOTES
Diagnosis: Brain Meningioma (ICD-D32.0)  Craniotomy         Next MD visit: August 13, 2018  Fall Risk: standard         Precautions: n/a          Medication Changes since last visit?: No  Subjective: Pt reports no pain; only tenderness near his incision si

## 2018-06-12 ENCOUNTER — OFFICE VISIT (OUTPATIENT)
Dept: PHYSICAL THERAPY | Age: 58
End: 2018-06-12
Attending: NEUROLOGICAL SURGERY
Payer: COMMERCIAL

## 2018-06-12 PROCEDURE — 97110 THERAPEUTIC EXERCISES: CPT | Performed by: PHYSICAL THERAPIST

## 2018-06-12 NOTE — PROGRESS NOTES
Diagnosis: Brain Meningioma (ICD-D32.0)  Craniotomy         Next MD visit: August 13, 2018  Fall Risk: standard         Precautions: n/a          Medication Changes since last visit?: No  Subjective: Pt complains of feeling tired today.  States he still fee

## 2018-06-14 ENCOUNTER — OFFICE VISIT (OUTPATIENT)
Dept: PHYSICAL THERAPY | Age: 58
End: 2018-06-14
Attending: NEUROLOGICAL SURGERY
Payer: COMMERCIAL

## 2018-06-14 PROCEDURE — 97110 THERAPEUTIC EXERCISES: CPT | Performed by: PHYSICAL THERAPIST

## 2018-06-14 NOTE — PROGRESS NOTES
Diagnosis: Brain Meningioma (ICD-D32.0)  Craniotomy         Next MD visit: August 13, 2018  Fall Risk: standard         Precautions: n/a          Medication Changes since last visit?: No     Subjective: Pt still with complaints of fatigue.  Reports trying t

## 2018-06-19 ENCOUNTER — OFFICE VISIT (OUTPATIENT)
Dept: PHYSICAL THERAPY | Age: 58
End: 2018-06-19
Attending: NEUROLOGICAL SURGERY
Payer: COMMERCIAL

## 2018-06-19 PROCEDURE — 97110 THERAPEUTIC EXERCISES: CPT | Performed by: PHYSICAL THERAPIST

## 2018-06-19 NOTE — PROGRESS NOTES
Diagnosis: Brain Meningioma (ICD-D32.0)  Craniotomy         Next MD visit: August 13, 2018  Fall Risk: standard         Precautions: n/a          Medication Changes since last visit?: No     Subjective: Pt complains of feeling tired.  States he cut the gras

## 2018-06-21 ENCOUNTER — OFFICE VISIT (OUTPATIENT)
Dept: PHYSICAL THERAPY | Age: 58
End: 2018-06-21
Attending: NEUROLOGICAL SURGERY
Payer: COMMERCIAL

## 2018-06-21 PROCEDURE — 97110 THERAPEUTIC EXERCISES: CPT

## 2018-06-21 NOTE — PROGRESS NOTES
Diagnosis: Brain Meningioma (ICD-D32.0)  Craniotomy         Next MD visit: August 13, 2018  Fall Risk: standard         Precautions: n/a          Medication Changes since last visit?: No     Subjective: Pt reports no c/o pain and no dizziness.     Objective

## 2018-06-27 ENCOUNTER — OFFICE VISIT (OUTPATIENT)
Dept: PHYSICAL THERAPY | Age: 58
End: 2018-06-27
Attending: NEUROLOGICAL SURGERY
Payer: COMMERCIAL

## 2018-06-27 PROCEDURE — 97110 THERAPEUTIC EXERCISES: CPT

## 2018-06-27 NOTE — PROGRESS NOTES
Physical Therapy Discharge Summary    Pt has attended 14 visits in Physical Therapy.      Diagnosis: Brain Meningioma (ICD-D32.0)  Craniotomy         Next MD visit: August 13, 2018  Fall Risk: standard         Precautions: n/a          Medication Changes s and for this course of care. Thank you for your referral. If you have any questions, please contact me at Dept: [phone].     Sincerely,  Electronically signed by therapist: Fabián Frazier PT

## 2018-06-29 ENCOUNTER — OFFICE VISIT (OUTPATIENT)
Dept: INTERNAL MEDICINE CLINIC | Facility: CLINIC | Age: 58
End: 2018-06-29

## 2018-06-29 VITALS
BODY MASS INDEX: 24.64 KG/M2 | HEART RATE: 82 BPM | DIASTOLIC BLOOD PRESSURE: 78 MMHG | SYSTOLIC BLOOD PRESSURE: 136 MMHG | HEIGHT: 67 IN | RESPIRATION RATE: 16 BRPM | TEMPERATURE: 98 F | WEIGHT: 157 LBS

## 2018-06-29 DIAGNOSIS — N52.8 OTHER MALE ERECTILE DYSFUNCTION: ICD-10-CM

## 2018-06-29 DIAGNOSIS — D32.9 MENINGIOMA (HCC): Primary | ICD-10-CM

## 2018-06-29 PROCEDURE — 99212 OFFICE O/P EST SF 10 MIN: CPT | Performed by: INTERNAL MEDICINE

## 2018-06-29 PROCEDURE — 99213 OFFICE O/P EST LOW 20 MIN: CPT | Performed by: INTERNAL MEDICINE

## 2018-06-29 RX ORDER — SILDENAFIL 50 MG/1
50 TABLET, FILM COATED ORAL AS NEEDED
Qty: 9 TABLET | Refills: 3 | Status: SHIPPED | OUTPATIENT
Start: 2018-06-29 | End: 2020-01-25

## 2018-06-29 NOTE — PROGRESS NOTES
HPI:    Patient ID: Severiano Blocker is a 62year old male. HPI  Meningioma  Patient post resection of his meningioma. Patient wants to return to work. Patient is , feeling well and feels that he can perform his job.  He didn't have seizure in will order meds to try          #[id number]

## 2018-06-29 NOTE — PATIENT INSTRUCTIONS
ASSESSMENT/PLAN:   Meningioma [facility])  Patient doing well, OK to return to work FT without restriction    Other male erectile dysfunction  Patient with chronic ED which has become worse with his recent illness.  Recommend fasting testosterone level and will o

## 2018-06-29 NOTE — ASSESSMENT & PLAN NOTE
Patient with chronic ED which has become worse with his recent illness.  Recommend fasting testosterone level and will order meds to try

## 2018-07-12 ENCOUNTER — TELEPHONE (OUTPATIENT)
Dept: INTERNAL MEDICINE CLINIC | Facility: CLINIC | Age: 58
End: 2018-07-12

## 2018-07-21 ENCOUNTER — LAB ENCOUNTER (OUTPATIENT)
Dept: LAB | Age: 58
End: 2018-07-21
Attending: INTERNAL MEDICINE
Payer: COMMERCIAL

## 2018-07-21 DIAGNOSIS — N52.8 OTHER MALE ERECTILE DYSFUNCTION: ICD-10-CM

## 2018-07-21 PROCEDURE — 36415 COLL VENOUS BLD VENIPUNCTURE: CPT

## 2018-07-21 PROCEDURE — 84403 ASSAY OF TOTAL TESTOSTERONE: CPT

## 2018-07-21 PROCEDURE — 84402 ASSAY OF FREE TESTOSTERONE: CPT

## 2018-07-24 LAB
TESTOSTERONE, FREE, S: 11.2 NG/DL
TESTOSTERONE, TOTAL, S: 488 NG/DL

## 2018-07-30 ENCOUNTER — TELEPHONE (OUTPATIENT)
Dept: INTERNAL MEDICINE CLINIC | Facility: CLINIC | Age: 58
End: 2018-07-30

## 2018-07-30 NOTE — TELEPHONE ENCOUNTER
If they don't cover Viagra, they won't cover any.  I can refer him to Urology if he wants to see if there are any other options

## 2018-07-30 NOTE — TELEPHONE ENCOUNTER
Patient called for lab results. Testosterone level normal  Asking if any other medication for ED he could try if may be approved by insurance. ?  Viagra not covered ?  Cialis

## 2018-08-13 ENCOUNTER — OFFICE VISIT (OUTPATIENT)
Dept: INTERNAL MEDICINE CLINIC | Facility: CLINIC | Age: 58
End: 2018-08-13
Payer: COMMERCIAL

## 2018-08-13 VITALS
BODY MASS INDEX: 24.01 KG/M2 | OXYGEN SATURATION: 97 % | SYSTOLIC BLOOD PRESSURE: 137 MMHG | HEART RATE: 85 BPM | TEMPERATURE: 98 F | DIASTOLIC BLOOD PRESSURE: 83 MMHG | RESPIRATION RATE: 18 BRPM | HEIGHT: 67 IN | WEIGHT: 153 LBS

## 2018-08-13 DIAGNOSIS — J20.9 ACUTE BRONCHITIS, UNSPECIFIED ORGANISM: Primary | ICD-10-CM

## 2018-08-13 PROCEDURE — 99214 OFFICE O/P EST MOD 30 MIN: CPT | Performed by: INTERNAL MEDICINE

## 2018-08-13 RX ORDER — BENZONATATE 100 MG/1
100 CAPSULE ORAL 3 TIMES DAILY PRN
Qty: 30 CAPSULE | Refills: 0 | Status: SHIPPED | OUTPATIENT
Start: 2018-08-13 | End: 2018-11-18 | Stop reason: ALTCHOICE

## 2018-08-13 RX ORDER — AZITHROMYCIN 250 MG/1
TABLET, FILM COATED ORAL
Qty: 6 TABLET | Refills: 0 | Status: SHIPPED | OUTPATIENT
Start: 2018-08-13 | End: 2019-04-23

## 2018-08-13 NOTE — PROGRESS NOTES
HPI:    Patient ID: Arden Lake is a 62year old male. HPI he came in complaining of a productive cough with yellow greenish sputum ongoing for 1 week.   According to him started 1 week ago with slight congestion that is getting worse initially nervous/anxious. Current Outpatient Prescriptions:  azithromycin 250 MG Oral Tab Take two tablets by mouth today, then one daily.  Disp: 6 tablet Rfl: 0   benzonatate 100 MG Oral Cap Take 1 capsule (100 mg total) by mouth 3 (three) times daily as membrane and external ear normal. No drainage or tenderness. No mastoid tenderness. Nose: Nose normal. Right sinus exhibits no maxillary sinus tenderness and no frontal sinus tenderness.  Left sinus exhibits no maxillary sinus tenderness and no frontal si normal.   Vitals reviewed. ASSESSMENT/PLAN:   Acute bronchitis, unspecified organism  (primary encounter diagnosis) will start him Z-Josh advised him to take with food and drink more fluids, try Tessalon Perles for the cough. drink more fluids . ad

## 2018-08-14 NOTE — PATIENT INSTRUCTIONS
Acute bronchitis, unspecified organism  (primary encounter diagnosis)will start him ANDREAS-Josh advised him to take with food and drink more fluids, try Tessalon Perles for the cough. drink more fluids . advised to quit smoking.  use albuterol inhaler as needed ev

## 2018-09-24 RX ORDER — BUPROPION HYDROCHLORIDE 150 MG/1
TABLET, EXTENDED RELEASE ORAL
Qty: 60 TABLET | Refills: 0 | Status: SHIPPED | OUTPATIENT
Start: 2018-09-24 | End: 2018-11-17

## 2018-11-18 ENCOUNTER — OFFICE VISIT (OUTPATIENT)
Dept: FAMILY MEDICINE CLINIC | Facility: CLINIC | Age: 58
End: 2018-11-18
Payer: COMMERCIAL

## 2018-11-18 VITALS
SYSTOLIC BLOOD PRESSURE: 124 MMHG | WEIGHT: 153 LBS | RESPIRATION RATE: 14 BRPM | HEART RATE: 86 BPM | OXYGEN SATURATION: 97 % | DIASTOLIC BLOOD PRESSURE: 80 MMHG | BODY MASS INDEX: 23.19 KG/M2 | HEIGHT: 68 IN | TEMPERATURE: 98 F

## 2018-11-18 DIAGNOSIS — J20.8 ACUTE VIRAL BRONCHITIS: Primary | ICD-10-CM

## 2018-11-18 DIAGNOSIS — F17.200 CURRENT SMOKER: ICD-10-CM

## 2018-11-18 PROCEDURE — 99213 OFFICE O/P EST LOW 20 MIN: CPT | Performed by: NURSE PRACTITIONER

## 2018-11-18 RX ORDER — HYDROCODONE BITARTRATE AND ACETAMINOPHEN 7.5; 325 MG/1; MG/1
TABLET ORAL
Refills: 0 | COMMUNITY
Start: 2018-08-20 | End: 2019-04-23

## 2018-11-18 RX ORDER — BENZONATATE 200 MG/1
200 CAPSULE ORAL 3 TIMES DAILY PRN
Qty: 30 CAPSULE | Refills: 0 | Status: SHIPPED | OUTPATIENT
Start: 2018-11-18 | End: 2019-04-23

## 2018-11-18 RX ORDER — PREDNISONE 20 MG/1
40 TABLET ORAL DAILY
Qty: 10 TABLET | Refills: 0 | Status: SHIPPED | OUTPATIENT
Start: 2018-11-18 | End: 2018-11-23

## 2018-11-18 NOTE — PATIENT INSTRUCTIONS
Will hold off on antibiotics at the moment. Will trial steroid boost and cough suppressant. If not improving, to follow with PCP for consideration of antibiotics.     Upper Respiratory Self Care    Unless told otherwise, ALWAYS take your prescribed medici likely killed by the antibiotic. 1-2 servings of yogurt daily may be helpful as well. Please take at least 3-4 hours before or after antibiotics. Follow up with primary care if not better in 2 weeks. Seek immediate care for worsening symptoms. may be poor, so a light diet is fine. Avoid dehydration by drinking 6 to 8 glasses of fluids per day (such as water, soft drinks, sports drinks, juices, tea, or soup). Extra fluids will help loosen secretions in the nose and lungs.   · Over-the-counter coug healthcare professional's instructions. How to Quit Smoking  Smoking is one of the hardest habits to break. About half of all people who have ever smoked have been able to quit. Most people who still smoke want to quit.  Here are some of the best way nicotine inhaler, or nasal spray. Each has advantages and side effects. Your doctor can review these with you.   Health benefits of quitting  The benefits of quitting start right away and keep improving the longer you go without smoking. These benefits occu

## 2018-11-18 NOTE — PROGRESS NOTES
CHIEF COMPLAINT:   Patient presents with:  Cough        HPI:   Carlos Padron is a 62year old male who presents for cough for  1  weeks. Cough started with weather change and runny nose.  Gradually became worse with greenish plegm, slight tightness azithromycin 250 MG Oral Tab Take two tablets by mouth today, then one daily. Disp: 6 tablet Rfl: 0   Sildenafil Citrate 50 MG Oral Tab Take 1 tablet (50 mg total) by mouth as needed for Erectile Dysfunction.  Disp: 9 tablet Rfl: 3      Past Medical History LYMPH: No cervical or supraclavicular lymphadenopathy. EXTREMITIES:  No clubbing, cyanosis, or edema. NEURO:  No focal deficits.       ASSESSMENT:   Kd Goins is a 62year old male who presents with Acute viral bronchitis  (primary encounter · Humidified air/Use Vaporizer and steamy baths or showers to thin secretions  · Tylenol or Ibuprofen for pain and fever (consider liquid ibuprofen, 4 tsp/ 400mg every 4 hours if sore throat is worst symptom)  · Chicken soup: Acviday has been extensively If there is a lot of inflammation, air flow is restricted. The air passages may also go into spasm, especially if you have asthma. This causes wheezing and difficulty breathing even in people who do not have asthma. Bronchitis usually lasts 7 to 14 days. · If prescribed, finish all antibiotic medicine, even if you are feeling better after only a few days. Follow-up care  Follow up with your healthcare provider, or as advised. If you had an X-ray or ECG (electrocardiogram), a specialist will review it.  You Most former smokers quit cold turkey (all at once). Trying to cut back gradually doesn't seem to work as well, perhaps because it continues the smoking habit. Also, it is possible to inhale more while smoking fewer cigarettes.  This results in the same amou · 20 minutes: Blood pressure and pulse return to normal.  · 8 hours: Oxygen levels return to normal.  · 2 days: Ability to smell and taste begin to improve as damaged nerves regrow. · 2 to 3 weeks: Circulation and lung function improve.   · 1 to 9 months:

## 2018-11-19 RX ORDER — BUPROPION HYDROCHLORIDE 150 MG/1
TABLET, EXTENDED RELEASE ORAL
Qty: 60 TABLET | Refills: 1 | Status: SHIPPED | OUTPATIENT
Start: 2018-11-19 | End: 2018-11-20

## 2018-11-20 ENCOUNTER — TELEPHONE (OUTPATIENT)
Dept: INTERNAL MEDICINE CLINIC | Facility: CLINIC | Age: 58
End: 2018-11-20

## 2018-11-20 RX ORDER — BUPROPION HYDROCHLORIDE 150 MG/1
150 TABLET, EXTENDED RELEASE ORAL 2 TIMES DAILY
Qty: 60 TABLET | Refills: 0 | Status: SHIPPED | OUTPATIENT
Start: 2018-11-20 | End: 2019-04-23

## 2019-03-05 ENCOUNTER — HOSPITAL ENCOUNTER (EMERGENCY)
Facility: HOSPITAL | Age: 59
Discharge: HOME | End: 2019-03-05
Payer: OTHER MISCELLANEOUS

## 2019-03-05 ENCOUNTER — APPOINTMENT (OUTPATIENT)
Dept: CT IMAGING | Facility: HOSPITAL | Age: 59
End: 2019-03-05
Attending: NURSE PRACTITIONER
Payer: OTHER MISCELLANEOUS

## 2019-03-05 VITALS
DIASTOLIC BLOOD PRESSURE: 80 MMHG | SYSTOLIC BLOOD PRESSURE: 142 MMHG | BODY MASS INDEX: 24 KG/M2 | OXYGEN SATURATION: 100 % | RESPIRATION RATE: 18 BRPM | TEMPERATURE: 98 F | WEIGHT: 155 LBS | HEART RATE: 75 BPM
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
## 2019-03-05 DIAGNOSIS — M62.838 SPASM OF MUSCLE: ICD-10-CM

## 2019-03-05 PROCEDURE — 72125 CT NECK SPINE W/O DYE: CPT | Performed by: NURSE PRACTITIONER

## 2019-03-05 PROCEDURE — 99284 EMERGENCY DEPT VISIT MOD MDM: CPT

## 2019-03-05 PROCEDURE — 70450 CT HEAD/BRAIN W/O DYE: CPT | Performed by: NURSE PRACTITIONER

## 2019-03-05 RX ORDER — TRAMADOL HYDROCHLORIDE 50 MG/1
50 TABLET ORAL ONCE
Status: COMPLETED | OUTPATIENT
Start: 2019-03-05 | End: 2019-03-05

## 2019-03-05 RX ORDER — TRAMADOL HYDROCHLORIDE 50 MG/1
TABLET ORAL EVERY 4 HOURS PRN
Qty: 10 TABLET | Refills: 0 | Status: SHIPPED | OUTPATIENT
Start: 2019-03-05 | End: 2019-03-12

## 2019-03-05 RX ORDER — CYCLOBENZAPRINE HCL 10 MG
10 TABLET ORAL ONCE
Status: COMPLETED | OUTPATIENT
Start: 2019-03-05 | End: 2019-03-05
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
## 2019-04-01 ENCOUNTER — TELEPHONE (OUTPATIENT)
Dept: INTERNAL MEDICINE CLINIC | Facility: CLINIC | Age: 59
End: 2019-04-01

## 2019-04-01 NOTE — TELEPHONE ENCOUNTER
Patients wife Sarah called requesting an appointment for patient to see you. Patient can see you after 3:30 pm daily, he is having headaches and needs refills.   Please call her cell# [phone]

## 2019-04-16 RX ORDER — BUPROPION HYDROCHLORIDE 150 MG/1
TABLET, EXTENDED RELEASE ORAL
Qty: 90 TABLET | Refills: 1 | Status: SHIPPED | OUTPATIENT
Start: 2019-04-16 | End: 2019-04-23

## 2019-04-16 NOTE — TELEPHONE ENCOUNTER
Refill passed per [facility], [facility] protocol.   Refill Protocol Appointment Criteria  · Appointment scheduled in the past 6 months or in the next 3 months  Recent Outpatient Visits            4 months ago Acute viral bronchitis    Lily

## 2019-04-23 ENCOUNTER — OFFICE VISIT (OUTPATIENT)
Dept: INTERNAL MEDICINE CLINIC | Facility: CLINIC | Age: 59
End: 2019-04-23
Payer: COMMERCIAL

## 2019-04-23 VITALS
WEIGHT: 156 LBS | TEMPERATURE: 99 F | BODY MASS INDEX: 23.64 KG/M2 | HEIGHT: 68 IN | HEART RATE: 76 BPM | DIASTOLIC BLOOD PRESSURE: 71 MMHG | OXYGEN SATURATION: 97 % | SYSTOLIC BLOOD PRESSURE: 130 MMHG

## 2019-04-23 DIAGNOSIS — M54.2 NECK PAIN ON RIGHT SIDE: ICD-10-CM

## 2019-04-23 DIAGNOSIS — Z12.11 COLON CANCER SCREENING: ICD-10-CM

## 2019-04-23 DIAGNOSIS — E78.00 HIGH CHOLESTEROL: ICD-10-CM

## 2019-04-23 DIAGNOSIS — Z12.5 PROSTATE CANCER SCREENING: ICD-10-CM

## 2019-04-23 DIAGNOSIS — R03.0 ELEVATED BLOOD PRESSURE READING: Primary | ICD-10-CM

## 2019-04-23 DIAGNOSIS — Z00.00 ADULT GENERAL MEDICAL EXAM: ICD-10-CM

## 2019-04-23 PROCEDURE — 99214 OFFICE O/P EST MOD 30 MIN: CPT | Performed by: INTERNAL MEDICINE

## 2019-04-23 RX ORDER — ATORVASTATIN CALCIUM 40 MG/1
TABLET, FILM COATED ORAL
Qty: 90 TABLET | Refills: 1 | Status: SHIPPED | OUTPATIENT
Start: 2019-04-23 | End: 2020-04-21

## 2019-04-23 RX ORDER — TIZANIDINE 4 MG/1
4 TABLET ORAL NIGHTLY
Qty: 5 TABLET | Refills: 0 | Status: SHIPPED | OUTPATIENT
Start: 2019-04-23 | End: 2019-04-28

## 2019-04-23 RX ORDER — BUPROPION HYDROCHLORIDE 150 MG/1
150 TABLET, EXTENDED RELEASE ORAL DAILY
Qty: 90 TABLET | Refills: 1 | Status: SHIPPED | OUTPATIENT
Start: 2019-04-23 | End: 2019-11-04

## 2019-04-23 NOTE — PATIENT INSTRUCTIONS
ASSESSMENT/PLAN:   Elevated blood pressure reading  (primary encounter diagnosis)Careful with diet and excercise at least 30 minutes 3-4 times a week. Check blood pressures at different times on different days. Can purchase own blood pressure monitor.  If n
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
## 2019-05-22 ENCOUNTER — TELEPHONE (OUTPATIENT)
Dept: INTERNAL MEDICINE CLINIC | Facility: CLINIC | Age: 59
End: 2019-05-22

## 2019-05-22 DIAGNOSIS — M54.2 NECK PAIN: Primary | ICD-10-CM
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
## 2019-05-23 NOTE — TELEPHONE ENCOUNTER
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 Does not need orthopedic   Needs to know type of metal he has in repair of brain tumor,   Advised to call Dr. Kin Mancuso surgeon office for that information.   Patient agreed

## 2019-05-28 RX ORDER — ATORVASTATIN CALCIUM 40 MG/1
TABLET, FILM COATED ORAL
Qty: 90 TABLET | Refills: 1 | Status: SHIPPED | OUTPATIENT
Start: 2019-05-28 | End: 2019-11-04

## 2019-07-24 RX ORDER — TRAMADOL HYDROCHLORIDE 50 MG/1
50 TABLET ORAL EVERY 6 HOURS PRN
COMMUNITY
End: 2021-03-10

## 2019-07-25 ENCOUNTER — HOSPITAL ENCOUNTER (OUTPATIENT)
Facility: HOSPITAL | Age: 59
Setting detail: HOSPITAL OUTPATIENT SURGERY
Discharge: HOME | End: 2019-07-25
Attending: INTERNAL MEDICINE | Admitting: INTERNAL MEDICINE
Payer: COMMERCIAL

## 2019-07-25 VITALS
BODY MASS INDEX: 22.73 KG/M2 | HEART RATE: 63 BPM | RESPIRATION RATE: 14 BRPM | HEIGHT: 68 IN | SYSTOLIC BLOOD PRESSURE: 122 MMHG | OXYGEN SATURATION: 98 % | DIASTOLIC BLOOD PRESSURE: 71 MMHG | WEIGHT: 150 LBS

## 2019-07-25 DIAGNOSIS — Z12.11 SPECIAL SCREENING FOR MALIGNANT NEOPLASMS, COLON: ICD-10-CM

## 2019-07-25 PROCEDURE — 99153 MOD SED SAME PHYS/QHP EA: CPT | Performed by: INTERNAL MEDICINE

## 2019-07-25 PROCEDURE — 88305 TISSUE EXAM BY PATHOLOGIST: CPT | Performed by: INTERNAL MEDICINE

## 2019-07-25 PROCEDURE — 0DBN8ZX EXCISION OF SIGMOID COLON, VIA NATURAL OR ARTIFICIAL OPENING ENDOSCOPIC, DIAGNOSTIC: ICD-10-PCS | Performed by: INTERNAL MEDICINE

## 2019-07-25 PROCEDURE — 99152 MOD SED SAME PHYS/QHP 5/>YRS: CPT | Performed by: INTERNAL MEDICINE

## 2019-07-25 RX ORDER — MIDAZOLAM HYDROCHLORIDE 1 MG/ML
1 INJECTION INTRAMUSCULAR; INTRAVENOUS EVERY 5 MIN PRN
Status: DISCONTINUED | OUTPATIENT
Start: 2019-07-25 | End: 2019-07-25

## 2019-07-25 RX ORDER — SODIUM CHLORIDE 0.9 % (FLUSH) 0.9 %
10 SYRINGE (ML) INJECTION AS NEEDED
Status: DISCONTINUED | OUTPATIENT
Start: 2019-07-25 | End: 2019-07-25

## 2019-07-25 RX ORDER — SODIUM CHLORIDE, SODIUM LACTATE, POTASSIUM CHLORIDE, CALCIUM CHLORIDE 600; 310; 30; 20 MG/100ML; MG/100ML; MG/100ML; MG/100ML
INJECTION, SOLUTION INTRAVENOUS CONTINUOUS
Status: DISCONTINUED | OUTPATIENT
Start: 2019-07-25 | End: 2019-07-25

## 2019-07-25 RX ORDER — MIDAZOLAM HYDROCHLORIDE 1 MG/ML
INJECTION INTRAMUSCULAR; INTRAVENOUS
Status: DISCONTINUED | OUTPATIENT
Start: 2019-07-25 | End: 2019-07-25

## 2019-07-25 NOTE — H&P
RE-PROCEDURE UPDATE    HPI: Amos Bhandari is a 61year old male. 4/3/1960. Patient presents for a colonoscopy. ALLERGIES:   Morphine                NAUSEA AND VOMITING      No current outpatient medications on file.   Past Medical History:   D

## 2019-07-25 NOTE — OPERATIVE REPORT
COLONOSCOPY REPORT    Patient Name:  Debby Forte Record #: [id number]  YOB: 1960  Date of Procedure: 7/25/2019    Referring physician: Jeanette Jacome. Juan Cobian MD    Surgeon:  Aydin Degroot.  Julián Solorzano MD    Pre-op diagnosis: Colon canc

## 2019-07-26 NOTE — PROGRESS NOTES
7/26/2019    Re:  Arden Lake   4/3/1960   [id number]       Dear Spring Began,    I had the pleasure of seeing Arden Lake for screening.     He underwent colonoscopy at [facility].    The colonoscopy revealed one polyp and div

## 2019-07-26 NOTE — PROGRESS NOTES
7/26/2019  [phone]    Dear Martin Reyes,     Here are the results from your recent testing :    During your colonoscopy a polyp was removed.   The pathology showed that this polyp was a hyperplastic poly

## 2019-11-04 ENCOUNTER — OFFICE VISIT (OUTPATIENT)
Dept: INTERNAL MEDICINE CLINIC | Facility: CLINIC | Age: 59
End: 2019-11-04
Payer: OTHER MISCELLANEOUS

## 2019-11-04 VITALS
SYSTOLIC BLOOD PRESSURE: 142 MMHG | WEIGHT: 157.38 LBS | DIASTOLIC BLOOD PRESSURE: 82 MMHG | HEART RATE: 78 BPM | TEMPERATURE: 98 F | OXYGEN SATURATION: 98 % | RESPIRATION RATE: 18 BRPM | BODY MASS INDEX: 23.85 KG/M2 | HEIGHT: 68 IN

## 2019-11-04 DIAGNOSIS — Z72.0 TOBACCO ABUSE: ICD-10-CM

## 2019-11-04 DIAGNOSIS — R03.0 ELEVATED BLOOD PRESSURE READING: Primary | ICD-10-CM

## 2019-11-04 DIAGNOSIS — D32.9 MENINGIOMA (HCC): ICD-10-CM

## 2019-11-04 DIAGNOSIS — M54.2 NECK PAIN ON RIGHT SIDE: ICD-10-CM

## 2019-11-04 PROCEDURE — 99406 BEHAV CHNG SMOKING 3-10 MIN: CPT | Performed by: INTERNAL MEDICINE

## 2019-11-04 PROCEDURE — 99214 OFFICE O/P EST MOD 30 MIN: CPT | Performed by: INTERNAL MEDICINE

## 2019-11-04 RX ORDER — BUPROPION HYDROCHLORIDE 150 MG/1
150 TABLET, EXTENDED RELEASE ORAL 2 TIMES DAILY
Qty: 180 TABLET | Refills: 1 | Status: SHIPPED | OUTPATIENT
Start: 2019-11-04 | End: 2020-05-19

## 2019-11-04 RX ORDER — GABAPENTIN 600 MG/1
600 TABLET ORAL 3 TIMES DAILY
Qty: 30 TABLET | Refills: 1 | Status: SHIPPED | OUTPATIENT
Start: 2019-11-04 | End: 2019-11-19

## 2019-11-04 RX ORDER — ATORVASTATIN CALCIUM 40 MG/1
TABLET, FILM COATED ORAL
Qty: 90 TABLET | Refills: 1 | Status: SHIPPED | OUTPATIENT
Start: 2019-11-04 | End: 2019-12-23

## 2019-11-04 NOTE — PROGRESS NOTES
HPI:    Patient ID: Aram Bauer is a 61year old male. 3-19 MRI. [facility] of pain is to due to pain and received cortisone injection in the neck X 1 in 7-8-19. PT X 25 sessions. Made worse. Seeing neuroSx. For ?  Sx. Dr. Barron Ranks  (H) 04/01/2018 09:45 AM    NONHDLC 161 (H) 04/01/2018 09:45 AM         Review of Systems   Constitutional: Negative. Negative for activity change, appetite change, chills, diaphoresis, fatigue, fever and unexpected weight change.    HENT: Negative • ASPIRIN 81 OR Take by mouth. • atorvastatin 40 MG Oral Tab TAKE 1 TABLET EVERY NIGHT 90 tablet 1   • Sildenafil Citrate 50 MG Oral Tab Take 1 tablet (50 mg total) by mouth as needed for Erectile Dysfunction.  9 tablet 3   • Albuterol Sulfate HFA (PROA Cardiovascular: Normal rate, regular rhythm, S1 normal, S2 normal, normal heart sounds, intact distal pulses and normal pulses. Pulses:       Carotid pulses are 2+ on the right side and 2+ on the left side.        Radial pulses are 2+ on the right side an Comments: Decreased  on right compared to left. In a right-handed individual.     Neurological: He is alert and oriented to person, place, and time. He displays no atrophy and no tremor. He exhibits normal muscle tone.    Reflex Scores:       Bicep Imaging & Referrals:  ORTHOPEDIC - EXTERNAL  NEUROSURGERY - INTERNAL        VW#[id number]    Tobacco Cessation Documentation (Smoking and Smokeless included):    I had an in depth therapy session with Arden Lake about his tobacco use risks and options

## 2019-11-04 NOTE — PATIENT INSTRUCTIONS
ASSESSMENT/PLAN:   Elevated blood pressure reading  (primary encounter diagnosis) Schedule RN only visit. Then, 1 week call regarding B/P's. Careful with diet and excercise at least 30 minutes 3-4 times a week.  Check blood pressures at different conrad challenges. Some examples are withdrawal symptoms, being around others who smoke, and drinking alcohol. · Remove all tobacco products and paraphernalia from your environment. Make your home and vehicles smoke-free.     Free resources for additional support

## 2019-11-11 ENCOUNTER — NURSE ONLY (OUTPATIENT)
Dept: INTERNAL MEDICINE CLINIC | Facility: CLINIC | Age: 59
End: 2019-11-11

## 2019-11-11 DIAGNOSIS — Z53.29 NO-SHOW FOR APPOINTMENT: Primary | ICD-10-CM

## 2019-11-16 ENCOUNTER — LAB ENCOUNTER (OUTPATIENT)
Dept: LAB | Facility: HOSPITAL | Age: 59
End: 2019-11-16
Attending: INTERNAL MEDICINE
Payer: COMMERCIAL

## 2019-11-16 DIAGNOSIS — E78.00 HIGH CHOLESTEROL: ICD-10-CM

## 2019-11-16 DIAGNOSIS — Z12.5 PROSTATE CANCER SCREENING: ICD-10-CM

## 2019-11-16 DIAGNOSIS — Z00.00 ADULT GENERAL MEDICAL EXAM: ICD-10-CM

## 2019-11-16 PROCEDURE — 84443 ASSAY THYROID STIM HORMONE: CPT

## 2019-11-16 PROCEDURE — 85025 COMPLETE CBC W/AUTO DIFF WBC: CPT

## 2019-11-16 PROCEDURE — 83036 HEMOGLOBIN GLYCOSYLATED A1C: CPT | Performed by: INTERNAL MEDICINE

## 2019-11-16 PROCEDURE — 80053 COMPREHEN METABOLIC PANEL: CPT

## 2019-11-16 PROCEDURE — 80061 LIPID PANEL: CPT

## 2019-11-16 PROCEDURE — 36415 COLL VENOUS BLD VENIPUNCTURE: CPT

## 2019-11-19 ENCOUNTER — MED REC SCAN ONLY (OUTPATIENT)
Dept: INTERNAL MEDICINE CLINIC | Facility: CLINIC | Age: 59
End: 2019-11-19

## 2019-11-19 ENCOUNTER — TELEPHONE (OUTPATIENT)
Dept: INTERNAL MEDICINE CLINIC | Facility: CLINIC | Age: 59
End: 2019-11-19

## 2019-11-20 NOTE — TELEPHONE ENCOUNTER
Seen readings for blood pressure. Hold adding medications may be more of a component of pain. Increase gabapentin to 1 in the morning and 2 in the evening. RN visit in 1 week to 2 weeks for blood pressure check with machine.   Stay away from salt and use

## 2019-11-28 RX ORDER — GABAPENTIN 600 MG/1
TABLET ORAL
Qty: 30 TABLET | Refills: 0 | OUTPATIENT
Start: 2019-11-28

## 2019-12-03 RX ORDER — GABAPENTIN 600 MG/1
TABLET ORAL
Qty: 90 TABLET | Refills: 1 | OUTPATIENT
Start: 2019-12-03

## 2019-12-04 RX ORDER — BUPROPION HYDROCHLORIDE 150 MG/1
150 TABLET, EXTENDED RELEASE ORAL 2 TIMES DAILY
Qty: 180 TABLET | Refills: 1 | Status: CANCELLED | OUTPATIENT
Start: 2019-12-04

## 2019-12-04 RX ORDER — GABAPENTIN 600 MG/1
TABLET ORAL
Qty: 90 TABLET | Refills: 1 | Status: SHIPPED | OUTPATIENT
Start: 2019-12-04 | End: 2020-02-05

## 2019-12-06 NOTE — TELEPHONE ENCOUNTER
Transifex, spoke with Uri Shirley valid, has refills available    Called patient  To inform him refills are available, patient will call [facility] and order his refill

## 2019-12-06 NOTE — TELEPHONE ENCOUNTER
Contact pharmacy script was sent on     buPROPion HCl ER, SR, 150 MG Oral Tablet 12 Hr 180 tablet 1 11/4/2019     Sig - Route:  Take 1 tablet (150 mg total) by mouth 2 (two) times daily. - Oral    Sent to pharmacy as: buPROPion HCl ER (SR) 150 MG Oral Table

## 2019-12-07 ENCOUNTER — TELEPHONE (OUTPATIENT)
Dept: INTERNAL MEDICINE CLINIC | Facility: CLINIC | Age: 59
End: 2019-12-07

## 2019-12-07 ENCOUNTER — NURSE ONLY (OUTPATIENT)
Dept: INTERNAL MEDICINE CLINIC | Facility: CLINIC | Age: 59
End: 2019-12-07
Payer: COMMERCIAL

## 2019-12-07 VITALS — DIASTOLIC BLOOD PRESSURE: 68 MMHG | SYSTOLIC BLOOD PRESSURE: 120 MMHG

## 2019-12-07 NOTE — TELEPHONE ENCOUNTER
Machine is off. Changed batteries 3 months ago. Return for RN only visit in 1 week with new batteries in old machine (2 y/o machine). Spoke with pt. And wife.

## 2019-12-12 ENCOUNTER — TELEPHONE (OUTPATIENT)
Dept: INTERNAL MEDICINE CLINIC | Facility: CLINIC | Age: 59
End: 2019-12-12

## 2019-12-12 ENCOUNTER — NURSE ONLY (OUTPATIENT)
Dept: INTERNAL MEDICINE CLINIC | Facility: CLINIC | Age: 59
End: 2019-12-12
Payer: COMMERCIAL

## 2019-12-12 VITALS — HEART RATE: 76 BPM | DIASTOLIC BLOOD PRESSURE: 74 MMHG | SYSTOLIC BLOOD PRESSURE: 135 MMHG

## 2019-12-12 DIAGNOSIS — Z01.30 BLOOD PRESSURE CHECK: Primary | ICD-10-CM

## 2019-12-12 NOTE — TELEPHONE ENCOUNTER
Blood pressure is reasonably controlled. The goal is to have the blood pressure under 140/90. Please let the patient continue to monitor blood pressure and contact us if it is consistently over 140/90.

## 2019-12-12 NOTE — TELEPHONE ENCOUNTER
Pt here for b/p check, did not bring his machine.     Left Arm   145/71   Pulse 83    Right Arm  135/74  Pulse 76

## 2019-12-23 ENCOUNTER — OFFICE VISIT (OUTPATIENT)
Dept: FAMILY MEDICINE CLINIC | Facility: CLINIC | Age: 59
End: 2019-12-23
Payer: COMMERCIAL

## 2019-12-23 VITALS
SYSTOLIC BLOOD PRESSURE: 136 MMHG | TEMPERATURE: 98 F | OXYGEN SATURATION: 97 % | DIASTOLIC BLOOD PRESSURE: 71 MMHG | RESPIRATION RATE: 18 BRPM | BODY MASS INDEX: 24 KG/M2 | HEART RATE: 78 BPM | WEIGHT: 155 LBS

## 2019-12-23 DIAGNOSIS — B97.89 ACUTE VIRAL SINUSITIS: Primary | ICD-10-CM

## 2019-12-23 DIAGNOSIS — J01.90 ACUTE VIRAL SINUSITIS: Primary | ICD-10-CM

## 2019-12-23 PROCEDURE — 99213 OFFICE O/P EST LOW 20 MIN: CPT | Performed by: NURSE PRACTITIONER

## 2019-12-23 RX ORDER — AMOXICILLIN AND CLAVULANATE POTASSIUM 875; 125 MG/1; MG/1
1 TABLET, FILM COATED ORAL 2 TIMES DAILY
Qty: 20 TABLET | Refills: 0 | Status: SHIPPED | OUTPATIENT
Start: 2019-12-23 | End: 2020-01-02

## 2019-12-30 RX ORDER — BUPROPION HYDROCHLORIDE 150 MG/1
TABLET, EXTENDED RELEASE ORAL
Qty: 90 TABLET | Refills: 0 | OUTPATIENT
Start: 2019-12-30

## 2020-01-15 PROBLEM — I10 HTN (HYPERTENSION): Status: ACTIVE | Noted: 2020-01-15

## 2020-01-15 PROBLEM — M48.02 CERVICAL SPINAL STENOSIS: Status: ACTIVE | Noted: 2020-01-15

## 2020-01-15 PROBLEM — M54.12 CERVICAL RADICULOPATHY: Status: ACTIVE | Noted: 2020-01-15

## 2020-01-25 ENCOUNTER — APPOINTMENT (OUTPATIENT)
Dept: GENERAL RADIOLOGY | Age: 60
End: 2020-01-25
Attending: EMERGENCY MEDICINE
Payer: COMMERCIAL

## 2020-01-25 ENCOUNTER — HOSPITAL ENCOUNTER (OUTPATIENT)
Age: 60
Discharge: HOME | End: 2020-01-25
Attending: EMERGENCY MEDICINE
Payer: COMMERCIAL

## 2020-01-25 VITALS
SYSTOLIC BLOOD PRESSURE: 137 MMHG | RESPIRATION RATE: 16 BRPM | TEMPERATURE: 100 F | WEIGHT: 160 LBS | BODY MASS INDEX: 24 KG/M2 | HEART RATE: 100 BPM | DIASTOLIC BLOOD PRESSURE: 71 MMHG | OXYGEN SATURATION: 98 %

## 2020-01-25 DIAGNOSIS — J44.1 ACUTE EXACERBATION OF CHRONIC OBSTRUCTIVE PULMONARY DISEASE (COPD) (HCC): Primary | ICD-10-CM

## 2020-01-25 PROCEDURE — 99214 OFFICE O/P EST MOD 30 MIN: CPT

## 2020-01-25 PROCEDURE — 71046 X-RAY EXAM CHEST 2 VIEWS: CPT | Performed by: EMERGENCY MEDICINE

## 2020-01-25 PROCEDURE — 99213 OFFICE O/P EST LOW 20 MIN: CPT

## 2020-01-25 RX ORDER — PREDNISONE 20 MG/1
60 TABLET ORAL DAILY
Qty: 15 TABLET | Refills: 0 | Status: SHIPPED | OUTPATIENT
Start: 2020-01-25 | End: 2020-01-30

## 2020-01-25 RX ORDER — ALBUTEROL SULFATE 90 UG/1
2 AEROSOL, METERED RESPIRATORY (INHALATION) EVERY 4 HOURS PRN
Qty: 1 INHALER | Refills: 0 | Status: SHIPPED | OUTPATIENT
Start: 2020-01-25 | End: 2020-02-24

## 2020-01-25 RX ORDER — AZITHROMYCIN 250 MG/1
TABLET, FILM COATED ORAL
Qty: 1 PACKAGE | Refills: 0 | Status: SHIPPED | OUTPATIENT
Start: 2020-01-25 | End: 2020-01-30

## 2020-01-25 NOTE — ED NOTES
Encouraged to stop smoking, meds reviewed and fluids encouraged to call pcp and make a follow up appointment.

## 2020-01-25 NOTE — ED INITIAL ASSESSMENT (HPI)
Smoker/ vapor coughing for 6 weeks- productive green sputum. +flu shot. Went to [facility]  Given amoxicillin 4 weeks ago and . Had had neck fusion. Few weeks ago.

## 2020-01-25 NOTE — ED PROVIDER NOTES
Patient Seen in: [facility] In [address] [place] [address]      History   Patient presents with:  Cough/URI  Fever    Stated Complaint: cough    HPI    30-year-old male presents for complaint of a cough.   Cough is been present for the past 4 to 6 week negative except as noted above.     Physical Exam     ED Triage Vitals [01/25/20 1155]   /71   Pulse 100   Resp 16   Temp 100 °F (37.8 °C)   Temp src Temporal   SpO2 98 %   O2 Device None (Room air)       Current:/71   Pulse 100   Temp 100 °F (3 Speech: Speech normal.               ED Course   Labs Reviewed - No data to display               MDM    Patient is non-toxic, well hydrated, tolerating PO and in no respiratory distress.  Airway is patent and patient is tolerating secretions without diffic medications    azithromycin (ZITHROMAX Z-THIERRY) 250 MG Oral Tab  500 mg once followed by 250 mg daily x 4 days  Qty: 1 Package Refills: 0    predniSONE 20 MG Oral Tab  Take 3 tablets (60 mg total) by mouth daily for 5 days. Qty: 15 tablet Refills: 0    !!  A

## 2020-02-05 RX ORDER — GABAPENTIN 600 MG/1
TABLET ORAL
Qty: 90 TABLET | Refills: 1 | Status: SHIPPED | OUTPATIENT
Start: 2020-02-05 | End: 2020-05-04

## 2020-02-12 ENCOUNTER — TELEPHONE (OUTPATIENT)
Dept: INTERNAL MEDICINE CLINIC | Facility: CLINIC | Age: 60
End: 2020-02-12

## 2020-02-12 NOTE — TELEPHONE ENCOUNTER
Patient walked into the [place] office asking if either Viagra or Cialis that patient was taking be added back to his chart.   Patient does not remember which rx was prescribed in the past.

## 2020-02-12 NOTE — TELEPHONE ENCOUNTER
Patient thinks Dr. Zaira Barboza prescribed generic Cialis please send to [address] [place] [address] in [place]

## 2020-02-13 RX ORDER — TADALAFIL 5 MG/1
TABLET ORAL
Qty: 10 TABLET | Refills: 5 | Status: SHIPPED | OUTPATIENT
Start: 2020-02-13 | End: 2021-02-24

## 2020-02-13 NOTE — TELEPHONE ENCOUNTER
The patient was informed with understanding    Per the patient he does not know how may he will need. He stated than 10. He is going to pay out of pocket. He would like generic.     Medication pended for #10    No need to call the patient back once se

## 2020-02-13 NOTE — TELEPHONE ENCOUNTER
Can send Cialis 5 mg as needed. Not sure if insurance will cover. How much tablets does he want. Depends on the pharmacy each tablet may be around $10 each.

## 2020-04-21 RX ORDER — ATORVASTATIN CALCIUM 40 MG/1
TABLET, FILM COATED ORAL
Qty: 90 TABLET | Refills: 1 | Status: SHIPPED | OUTPATIENT
Start: 2020-04-21 | End: 2020-09-28

## 2020-05-04 RX ORDER — GABAPENTIN 600 MG/1
TABLET ORAL
Qty: 270 TABLET | Refills: 1 | Status: SHIPPED | OUTPATIENT
Start: 2020-05-04 | End: 2020-11-02

## 2020-05-18 PROBLEM — Z00.00 ADULT GENERAL MEDICAL EXAM: Status: ACTIVE | Noted: 2020-05-18

## 2020-05-19 ENCOUNTER — OFFICE VISIT (OUTPATIENT)
Dept: INTERNAL MEDICINE CLINIC | Facility: CLINIC | Age: 60
End: 2020-05-19
Payer: COMMERCIAL

## 2020-05-19 VITALS
DIASTOLIC BLOOD PRESSURE: 78 MMHG | BODY MASS INDEX: 24.98 KG/M2 | TEMPERATURE: 98 F | OXYGEN SATURATION: 97 % | WEIGHT: 164.81 LBS | RESPIRATION RATE: 18 BRPM | HEIGHT: 68 IN | HEART RATE: 96 BPM | SYSTOLIC BLOOD PRESSURE: 132 MMHG

## 2020-05-19 DIAGNOSIS — M54.12 CERVICAL RADICULOPATHY: ICD-10-CM

## 2020-05-19 DIAGNOSIS — M48.02 CERVICAL SPINAL STENOSIS: ICD-10-CM

## 2020-05-19 DIAGNOSIS — I10 ESSENTIAL HYPERTENSION: ICD-10-CM

## 2020-05-19 DIAGNOSIS — R73.9 HYPERGLYCEMIA: ICD-10-CM

## 2020-05-19 DIAGNOSIS — D22.9 ATYPICAL NEVI: ICD-10-CM

## 2020-05-19 DIAGNOSIS — F17.210 CIGARETTE SMOKER: ICD-10-CM

## 2020-05-19 DIAGNOSIS — E78.00 HIGH CHOLESTEROL: Primary | ICD-10-CM

## 2020-05-19 DIAGNOSIS — Z00.00 ADULT GENERAL MEDICAL EXAM: ICD-10-CM

## 2020-05-19 DIAGNOSIS — Z72.0 TOBACCO ABUSE: ICD-10-CM

## 2020-05-19 PROCEDURE — 3008F BODY MASS INDEX DOCD: CPT | Performed by: INTERNAL MEDICINE

## 2020-05-19 PROCEDURE — 99396 PREV VISIT EST AGE 40-64: CPT | Performed by: INTERNAL MEDICINE

## 2020-05-19 PROCEDURE — 3078F DIAST BP <80 MM HG: CPT | Performed by: INTERNAL MEDICINE

## 2020-05-19 PROCEDURE — 99214 OFFICE O/P EST MOD 30 MIN: CPT | Performed by: INTERNAL MEDICINE

## 2020-05-19 PROCEDURE — 99406 BEHAV CHNG SMOKING 3-10 MIN: CPT | Performed by: INTERNAL MEDICINE

## 2020-05-19 PROCEDURE — 3075F SYST BP GE 130 - 139MM HG: CPT | Performed by: INTERNAL MEDICINE

## 2020-05-19 PROCEDURE — 81003 URINALYSIS AUTO W/O SCOPE: CPT | Performed by: INTERNAL MEDICINE

## 2020-05-19 RX ORDER — BUPROPION HYDROCHLORIDE 150 MG/1
150 TABLET, EXTENDED RELEASE ORAL 2 TIMES DAILY
Qty: 180 TABLET | Refills: 1 | Status: SHIPPED | OUTPATIENT
Start: 2020-05-19 | End: 2020-06-14

## 2020-05-19 RX ORDER — TADALAFIL 20 MG/1
20 TABLET ORAL
Qty: 15 TABLET | Refills: 0 | Status: SHIPPED | OUTPATIENT
Start: 2020-05-19 | End: 2020-09-28

## 2020-05-19 RX ORDER — TADALAFIL 10 MG/1
10 TABLET ORAL
Qty: 15 TABLET | Refills: 0 | Status: SHIPPED | OUTPATIENT
Start: 2020-05-19 | End: 2021-02-24

## 2020-05-19 NOTE — PROGRESS NOTES
HPI:    Patient ID: Ander Hoover is a 61year old male. Ander Hoover is a 61year old male who presents for a complete physical exam.   HPI:     Occ. Tingling R hand   Doing PT.      Wt Readings from Last 3 Encounters:  05/19/20 : 164 lb 1000 units Oral Tab Take 1 tablet by mouth nightly. • Albuterol Sulfate HFA (PROAIR HFA) 108 (90 Base) MCG/ACT Inhalation Aero Soln Inhale 2 puffs into the lungs every 6 (six) hours as needed for Wheezing.  1 Inhaler 3      Past Medical History:   Diagn Readings from Last 3 Encounters:  05/19/20 : 164 lb 12.8 oz (74.8 kg)  01/25/20 : 160 lb (72.6 kg)  12/23/19 : 155 lb (70.3 kg)    BP Readings from Last 3 Encounters:  05/19/20 : 132/78  01/25/20 : 137/71  12/23/19 : 136/71    Labs:   Lab Results   Compone congestion, dental problem, drooling, ear discharge, ear pain, facial swelling, hearing loss, mouth sores, nosebleeds, postnasal drip, rhinorrhea, sinus pressure, sinus pain, sneezing, sore throat, tinnitus, trouble swallowing and voice change.     Eyes: Ne Dysfunction. 15 tablet 0   • Tadalafil 20 MG Oral Tab Take 1 tablet (20 mg total) by mouth daily as needed for Erectile Dysfunction. 15 tablet 0   • buPROPion HCl ER, SR, 150 MG Oral Tablet 12 Hr Take 1 tablet (150 mg total) by mouth 2 (two) times daily.  1 Sitting, Cuff Size: adult)   Pulse 96   Temp 98.4 °F (36.9 °C) (Oral)   Resp 18   Ht 5' 8\" (1.727 m)   Wt 164 lb 12.8 oz (74.8 kg)   SpO2 97%   BMI 25.06 kg/m²   BP Readings from Last 3 Encounters:  05/19/20 : 132/78  01/25/20 : 137/71  12/23/19 : 136/71 dentures. No oral lesions. No trismus in the jaw. No dental abscesses, uvula swelling, lacerations or dental caries. No oropharyngeal exudate, posterior oropharyngeal edema, posterior oropharyngeal erythema or tonsillar abscesses.    Eyes: Pupils are equal, There is no rigidity, no rebound, no guarding and no CVA tenderness. Musculoskeletal:         General: No edema.       Right hip: He exhibits normal range of motion, normal strength, no tenderness, no bony tenderness, no swelling, no crepitus, no deformit walk on toes and heels. Skin: Skin is warm and dry. No rash noted. He is not diaphoretic. No cyanosis or erythema. No pallor. Nails show no clubbing. Psychiatric: He has a normal mood and affect.  His speech is normal and behavior is normal. Cognition Follow up back pain. Tobacco Cessation Documentation (Smoking and Smokeless included):    I had an in depth therapy session with Amos Bhandari about his tobacco use risks and options using the USPSTF's Five A's approach:    Ask: Virgil Howard is

## 2020-05-19 NOTE — PATIENT INSTRUCTIONS
ASSESSMENT/PLAN:   High cholesterol  (primary encounter diagnosis) Stable 11-19. Essential hypertension Good control. Careful with diet and excercise at least 30 minutes 3-4 times a week. Check blood pressures at different times on different days. or sometimes after a simple awkward movement. In either case, muscle spasm is often present and adds to the pain. Acute neck and back pain usually gets better in 1 to 2 weeks.  Pain related to disk disease, arthritis in the spinal joints or spinal stenosis backward. · When in bed, try to find a position of comfort. A firm mattress is best. Try lying flat on your back with pillows under your knees. You can also try lying on your side with your knees bent up towards your chest and a pillow between your knees. or further tests may be needed. If X-rays were taken, you will be notified of any new findings that may affect your care.   Call 911  Call 911 if any of the following occur:  · Trouble breathing  · Confusion  · Very drowsy or trouble awakening  · Fainting without stretching them. · During the first 24 to 72 hours after an acute injury or flare-up of chronic back pain, put an ice pack on the painful area for 20 minutes and then remove it for 20 minutes.  Do thisover a period of 60 to 90 minutes, or several t in front of you. · Straighten your legs to lift the object. · Lower the object to the floor in the reverse fashion. · If you must slide something across the floor, push it.     Posture tips  Sitting  Sit in chairs with straight backs or low-back support All rights reserved. This information is not intended as a substitute for professional medical care. Always follow your healthcare professional's instructions.         Exercises to Strengthen Your Lower Back  Strong lower back and abdominal muscles work tog straight. Tighten your stomach muscles. Slowly lift your straight leg 6 to 12 inches off the floor and hold for up to 5 seconds. Repeat 10 times on each side. Standin. Wall squats: Stand with your back against the wall.  Move your feet about 12 inches the floor. Holding the tension in your abdominal muscles, take another breath and raise your shoulder blades off the ground (this is not a full sit-up). Keep your head in line with your body (don’t bend your neck forward).  Hold for 2 seconds, then slowly l appendicitis, bladder or kidney infections, pelvic infections, and many other things. Acute back pain usually gets better in 1 to 2 weeks.  Back pain related to disk disease, arthritis in the spinal joints or spinal stenosis (narrowing of the spinal canal) with your doctor about the best treatment for your back pain. · Therapeutic massage can help relax the back muscles without stretching them.   · Be aware of safe lifting methods and do not lift anything without stretching first.  Medicines  Talk to your do and self-care can help. Sometimes low back pain can be a sign of a bigger problem. Call your healthcare provider if your pain returns often or gets worse over time.  For the long-term care of your back, get regular exercise, lose any excess weight and learn notice that the pain isn’t decreasing after more than a week. Date Last Reviewed: 3/1/2018  © 7641-9116 [employer]. [address] [facility], [address] [place] [address]. All rights reserved.  This information is not intended as a substitute for profess 1407 [facility], [facility] [place]. All rights reserved. This information is not intended as a substitute for professional medical care. Always follow your healthcare professional's instructions.         Back Safety for Healthcare Workers     [facility] your healthcare professional's instructions. Caring for Your Back Throughout the Day    Take care of your back throughout the day. You will likely have fewer back problems if you do. Try to warm up before you move. Shift positions often.  Also do you spine has 3 natural curves. These keep your body balanced. Strong, flexible muscles support your spine. Soft, cushioning disks separate the hard bones of your spine. The disks let your spine bend and move.     The parts of the spine  · The vertebrae are the slowly through your mouth until your lungs feel empty. Repeat 3 to 4 times. Relieve tension  Muscle tension can create tender spots called trigger points. The tips below may help relieve muscle tension. · Press the trigger point if you can reach it.  If n 11/1/2017  © 4668-1675 [employer]. 1407 [facility], [facility] [place]. All rights reserved. This information is not intended as a substitute for professional medical care. Always follow your healthcare professional's instructions. ([phone]) for additional support.

## 2020-06-14 RX ORDER — BUPROPION HYDROCHLORIDE 150 MG/1
TABLET, EXTENDED RELEASE ORAL
Qty: 180 TABLET | Refills: 1 | Status: SHIPPED | OUTPATIENT
Start: 2020-06-14 | End: 2020-12-14

## 2020-08-25 ENCOUNTER — TELEPHONE (OUTPATIENT)
Dept: INTERNAL MEDICINE CLINIC | Facility: CLINIC | Age: 60
End: 2020-08-25

## 2020-08-25 NOTE — TELEPHONE ENCOUNTER
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 Dr Darren Vasquez needs a note stating that you released in 2018 after surgery. Patient needs Dr Iwona Moncada and Dr Boubacar Ramirez on letter. Patient will call Dr Bennett Blind office for letter     Dr Flory Bunn MD  [address] [place] [address]# [phone]  Fax# [phone]    When ready please call patient.    Can send thru my chart

## 2020-09-27 PROBLEM — Z71.85 VACCINE COUNSELING: Status: ACTIVE | Noted: 2020-09-27

## 2020-09-28 ENCOUNTER — VIRTUAL PHONE E/M (OUTPATIENT)
Dept: INTERNAL MEDICINE CLINIC | Facility: CLINIC | Age: 60
End: 2020-09-28
Payer: COMMERCIAL

## 2020-09-28 DIAGNOSIS — M54.12 CERVICAL RADICULOPATHY: ICD-10-CM

## 2020-09-28 DIAGNOSIS — Z12.5 PROSTATE CANCER SCREENING: ICD-10-CM

## 2020-09-28 DIAGNOSIS — Z71.85 VACCINE COUNSELING: ICD-10-CM

## 2020-09-28 DIAGNOSIS — F17.210 CIGARETTE SMOKER: ICD-10-CM

## 2020-09-28 DIAGNOSIS — Z72.0 TOBACCO ABUSE: ICD-10-CM

## 2020-09-28 DIAGNOSIS — E78.00 HIGH CHOLESTEROL: Primary | ICD-10-CM

## 2020-09-28 DIAGNOSIS — I10 ESSENTIAL HYPERTENSION: ICD-10-CM

## 2020-09-28 PROCEDURE — 99214 OFFICE O/P EST MOD 30 MIN: CPT | Performed by: INTERNAL MEDICINE

## 2020-09-28 PROCEDURE — 99406 BEHAV CHNG SMOKING 3-10 MIN: CPT | Performed by: INTERNAL MEDICINE

## 2020-09-28 RX ORDER — ATORVASTATIN CALCIUM 40 MG/1
TABLET, FILM COATED ORAL
Qty: 90 TABLET | Refills: 1 | Status: SHIPPED | OUTPATIENT
Start: 2020-09-28 | End: 2021-02-27

## 2020-09-28 RX ORDER — TADALAFIL 20 MG/1
20 TABLET ORAL
Qty: 20 TABLET | Refills: 0 | Status: SHIPPED | OUTPATIENT
Start: 2020-09-28 | End: 2021-02-24

## 2020-09-28 NOTE — PROGRESS NOTES
Virtual Telephone Check-In    [address] verbally consents to a Virtual/Telephone Doximity (does not have smart phone) Check-In visit on 09/28/20.     Patient understands and accepts financial responsibility for any deductible, co-insurance and/o Albuterol Sulfate HFA (PROAIR HFA) 108 (90 Base) MCG/ACT Inhalation Aero Soln, Inhale 2 puffs into the lungs every 6 (six) hours as needed for Wheezing., Disp: 1 Inhaler, Rfl: 3    Summary of topics discussed: I, Dr. Isha Simpson, spoke with pt.      Patient stat wheezing, not anxious  & MS sharp. Diagnoses and all orders for this visit:    High cholesterol Check blood. Essential hypertension Slightly higher after work. Careful with diet and excercise at least 30 minutes 3-4 times a week.  Check blood pressur making and tests are ordered as detailed in the plan of care above. Elieser Adam. Isha Simpson MD        Tobacco Cessation Documentation (Smoking and Smokeless included):    I had an in depth therapy session with Claudio Real about his tobacco use risks

## 2020-11-02 RX ORDER — GABAPENTIN 600 MG/1
TABLET ORAL
Qty: 270 TABLET | Refills: 1 | Status: SHIPPED | OUTPATIENT
Start: 2020-11-02 | End: 2021-02-27

## 2020-11-16 ENCOUNTER — PATIENT MESSAGE (OUTPATIENT)
Dept: INTERNAL MEDICINE CLINIC | Facility: CLINIC | Age: 60
End: 2020-11-16

## 2020-11-17 RX ORDER — CYCLOBENZAPRINE HCL 10 MG
10 TABLET ORAL NIGHTLY
Qty: 60 TABLET | Refills: 1 | Status: SHIPPED | OUTPATIENT
Start: 2020-11-17 | End: 2021-03-19

## 2020-11-17 NOTE — TELEPHONE ENCOUNTER
From: Quan Bobo  To: Anselmo Caldwell.  Tiffanie Josue MD  Sent: 11/16/2020 8:37 PM CST  Subject: Non-Urgent Medical Question    I was on flexeril for for the swelling of my hands and elbows due to the arthritis is thir any way you can give me a prescription f

## 2020-12-14 RX ORDER — BUPROPION HYDROCHLORIDE 150 MG/1
150 TABLET, EXTENDED RELEASE ORAL 2 TIMES DAILY
Qty: 180 TABLET | Refills: 1 | Status: SHIPPED | OUTPATIENT
Start: 2020-12-14 | End: 2021-02-27

## 2021-02-24 ENCOUNTER — OFFICE VISIT (OUTPATIENT)
Dept: INTERNAL MEDICINE CLINIC | Facility: CLINIC | Age: 61
End: 2021-02-24
Payer: COMMERCIAL

## 2021-02-24 VITALS
OXYGEN SATURATION: 95 % | HEART RATE: 93 BPM | DIASTOLIC BLOOD PRESSURE: 80 MMHG | BODY MASS INDEX: 24.86 KG/M2 | HEIGHT: 68 IN | SYSTOLIC BLOOD PRESSURE: 135 MMHG | WEIGHT: 164 LBS

## 2021-02-24 DIAGNOSIS — M54.2 NECK PAIN: Primary | ICD-10-CM

## 2021-02-24 PROCEDURE — 3075F SYST BP GE 130 - 139MM HG: CPT | Performed by: INTERNAL MEDICINE

## 2021-02-24 PROCEDURE — 3008F BODY MASS INDEX DOCD: CPT | Performed by: INTERNAL MEDICINE

## 2021-02-24 PROCEDURE — 99214 OFFICE O/P EST MOD 30 MIN: CPT | Performed by: INTERNAL MEDICINE

## 2021-02-24 PROCEDURE — 3079F DIAST BP 80-89 MM HG: CPT | Performed by: INTERNAL MEDICINE

## 2021-02-24 RX ORDER — CYCLOBENZAPRINE HCL 5 MG
5 TABLET ORAL NIGHTLY PRN
Qty: 20 TABLET | Refills: 0 | Status: SHIPPED | OUTPATIENT
Start: 2021-02-24 | End: 2021-07-25

## 2021-02-24 RX ORDER — PREDNISONE 10 MG/1
20 TABLET ORAL DAILY
Qty: 10 TABLET | Refills: 0 | Status: SHIPPED | OUTPATIENT
Start: 2021-02-24 | End: 2021-02-27

## 2021-02-24 RX ORDER — TADALAFIL 20 MG/1
20 TABLET ORAL
Qty: 20 TABLET | Refills: 0 | Status: SHIPPED | OUTPATIENT
Start: 2021-02-24 | End: 2021-07-17

## 2021-02-25 ENCOUNTER — TELEPHONE (OUTPATIENT)
Dept: INTERNAL MEDICINE CLINIC | Facility: CLINIC | Age: 61
End: 2021-02-25

## 2021-02-25 NOTE — TELEPHONE ENCOUNTER
Patient had appt yesterday and refills sent to wrong phramacy    Please send Tadalafil 20 MG Oral Tab to [facility]  on file    Please send   predniSONE 10 MG Oral Tab   cyclobenzaprine 5 MG Oral   buPROPion HCl ER, SR, 150 MG Oral Tablet 12 Hr   gabape

## 2021-02-25 NOTE — TELEPHONE ENCOUNTER
Left a message to call back. 1)  [facility] Which pharmacy does he need it to go to? 2)   Does he need a refill on his other medications?     3)  Inform him he will need to inform us which pharmacy he woul

## 2021-02-25 NOTE — PATIENT INSTRUCTIONS
Neck pain- prednisone for few days , neck exercises instruction, I wll add flexeril at nigh as needed , discussed side effects     Left hand pain- most likely OA, prednisone for few days , if not better call us

## 2021-02-25 NOTE — PROGRESS NOTES
HPI:    Patient ID: Gunnar Akins is a 61year old male. HPI     Came  In today complaining of pain and swelling on his left wrist.  According to him he had similar episode in the past and was told that it is just osteoarthritis.   He is taking i Current Outpatient Medications   Medication Sig Dispense Refill   • Tadalafil 20 MG Oral Tab Take 1 tablet (20 mg total) by mouth daily as needed for Erectile Dysfunction.  20 tablet 0   • predniSONE 10 MG Oral Tab Take 2 tablets (20 mg total) by mouth da tried quitting 4/1/18; back to 1PPD    Alcohol use: No      Alcohol/week: 0.0 standard drinks    Drug use: No       PHYSICAL EXAM:    Physical Exam   Constitutional: He is oriented to person, place, and time. He appears well-developed and well-nourished.  Minda Hankins No edema. Left wrist: He exhibits tenderness. He exhibits normal range of motion, no bony tenderness, no swelling, no effusion, no crepitus and no laceration. Cervical back: He exhibits tenderness and spasm. He exhibits normal range of motion.

## 2021-02-27 RX ORDER — GABAPENTIN 600 MG/1
TABLET ORAL
Qty: 270 TABLET | Refills: 1 | Status: SHIPPED | OUTPATIENT
Start: 2021-02-27 | End: 2021-05-24

## 2021-02-27 RX ORDER — BUPROPION HYDROCHLORIDE 150 MG/1
150 TABLET, EXTENDED RELEASE ORAL 2 TIMES DAILY
Qty: 180 TABLET | Refills: 1 | Status: SHIPPED | OUTPATIENT
Start: 2021-02-27 | End: 2021-08-24

## 2021-02-27 RX ORDER — ATORVASTATIN CALCIUM 40 MG/1
TABLET, FILM COATED ORAL
Qty: 90 TABLET | Refills: 1 | Status: SHIPPED | OUTPATIENT
Start: 2021-02-27 | End: 2021-05-01

## 2021-02-27 RX ORDER — ALBUTEROL SULFATE 90 UG/1
2 AEROSOL, METERED RESPIRATORY (INHALATION) EVERY 6 HOURS PRN
Qty: 1 INHALER | Refills: 3 | Status: SHIPPED | OUTPATIENT
Start: 2021-02-27 | End: 2021-12-07

## 2021-02-27 RX ORDER — PREDNISONE 10 MG/1
20 TABLET ORAL DAILY
Qty: 10 TABLET | Refills: 0 | Status: SHIPPED | OUTPATIENT
Start: 2021-02-27

## 2021-02-27 NOTE — TELEPHONE ENCOUNTER
[facility] states he [facility]. Patient states she needs all other pended medications sent to [facility] in [facility]. Dr. Sellers Led:   See pended scripts and sign     Routed to Dr. Arsenio Monaco in error.

## 2021-03-10 ENCOUNTER — HOSPITAL ENCOUNTER (OUTPATIENT)
Dept: GENERAL RADIOLOGY | Age: 61
Discharge: HOME | End: 2021-03-10
Attending: INTERNAL MEDICINE
Payer: COMMERCIAL

## 2021-03-10 ENCOUNTER — OFFICE VISIT (OUTPATIENT)
Dept: INTERNAL MEDICINE CLINIC | Facility: CLINIC | Age: 61
End: 2021-03-10
Payer: COMMERCIAL

## 2021-03-10 VITALS
DIASTOLIC BLOOD PRESSURE: 71 MMHG | SYSTOLIC BLOOD PRESSURE: 129 MMHG | HEIGHT: 68 IN | WEIGHT: 161 LBS | HEART RATE: 101 BPM | BODY MASS INDEX: 24.4 KG/M2

## 2021-03-10 DIAGNOSIS — M25.532 LEFT WRIST PAIN: Primary | ICD-10-CM

## 2021-03-10 DIAGNOSIS — M25.532 LEFT WRIST PAIN: ICD-10-CM

## 2021-03-10 PROCEDURE — 3074F SYST BP LT 130 MM HG: CPT | Performed by: INTERNAL MEDICINE

## 2021-03-10 PROCEDURE — 3078F DIAST BP <80 MM HG: CPT | Performed by: INTERNAL MEDICINE

## 2021-03-10 PROCEDURE — 99213 OFFICE O/P EST LOW 20 MIN: CPT | Performed by: INTERNAL MEDICINE

## 2021-03-10 PROCEDURE — 73110 X-RAY EXAM OF WRIST: CPT | Performed by: INTERNAL MEDICINE

## 2021-03-10 PROCEDURE — 3008F BODY MASS INDEX DOCD: CPT | Performed by: INTERNAL MEDICINE

## 2021-03-10 RX ORDER — NAPROXEN 500 MG/1
500 TABLET ORAL 2 TIMES DAILY WITH MEALS
Qty: 20 TABLET | Refills: 0 | Status: SHIPPED | OUTPATIENT
Start: 2021-03-10 | End: 2021-06-11

## 2021-03-10 NOTE — PROGRESS NOTES
HPI/Subjective:     Patient ID: Amos Bhandari is a 61year old male. HPI He came in today for follow-up on his left hand pain.   According to him he took steroids for few days he felt better after Sunday night he noticed that left wrist is swolle Outpatient Medications   Medication Sig Dispense Refill   • naproxen 500 MG Oral Tab Take 1 tablet (500 mg total) by mouth 2 (two) times daily with meals. 20 tablet 0   • predniSONE 10 MG Oral Tab Take 2 tablets (20 mg total) by mouth daily.  10 tablet 0 tried quitting 4/1/18; back to 1PPD    Alcohol use: No      Alcohol/week: 0.0 standard drinks    Drug use: No       Objective:   Physical Exam  Vitals reviewed. Constitutional:       General: He is not in acute distress.      Appearance: He is well-develo swelling, deformity, effusion, lacerations, tenderness, bony tenderness, snuff box tenderness or crepitus. Decreased range of motion. Normal pulse. Cervical back: Normal range of motion and neck supple.    Lymphadenopathy:      Cervical: No cervical ad

## 2021-03-19 RX ORDER — CYCLOBENZAPRINE HCL 10 MG
10 TABLET ORAL NIGHTLY
Qty: 60 TABLET | Refills: 1 | Status: SHIPPED | OUTPATIENT
Start: 2021-03-19 | End: 2021-06-08

## 2021-05-01 RX ORDER — ATORVASTATIN CALCIUM 40 MG/1
TABLET, FILM COATED ORAL
Qty: 90 TABLET | Refills: 1 | Status: SHIPPED | OUTPATIENT
Start: 2021-05-01 | End: 2021-07-25

## 2021-05-24 RX ORDER — GABAPENTIN 600 MG/1
TABLET ORAL
Qty: 270 TABLET | Refills: 1 | Status: SHIPPED | OUTPATIENT
Start: 2021-05-24 | End: 2021-08-24

## 2021-06-08 RX ORDER — CYCLOBENZAPRINE HCL 10 MG
10 TABLET ORAL NIGHTLY
Qty: 60 TABLET | Refills: 1 | Status: SHIPPED | OUTPATIENT
Start: 2021-06-08

## 2021-06-12 RX ORDER — CYCLOBENZAPRINE HCL 10 MG
10 TABLET ORAL NIGHTLY
Qty: 60 TABLET | Refills: 1 | OUTPATIENT
Start: 2021-06-12

## 2021-06-13 RX ORDER — NAPROXEN 500 MG/1
500 TABLET ORAL 2 TIMES DAILY WITH MEALS
Qty: 20 TABLET | Refills: 0 | Status: SHIPPED | OUTPATIENT
Start: 2021-06-13 | End: 2022-12-13

## 2021-06-14 NOTE — TELEPHONE ENCOUNTER
Patient viewed [address] on 6/12/21.       : Medication Renewal Request  Message [phone]  From   Karen Ackerman RN To   Génesis Davidson and Delivered   6/12/2021 11:19 AM   Last Read in [address]   6/12/2021 12:38 PM by Vernice Blizzard

## 2021-07-17 RX ORDER — TADALAFIL 20 MG/1
20 TABLET ORAL
Qty: 20 TABLET | Refills: 0 | Status: SHIPPED | OUTPATIENT
Start: 2021-07-17 | End: 2021-08-24

## 2021-07-26 RX ORDER — ATORVASTATIN CALCIUM 40 MG/1
TABLET, FILM COATED ORAL
Qty: 90 TABLET | Refills: 1 | Status: SHIPPED | OUTPATIENT
Start: 2021-07-26 | End: 2021-08-24

## 2021-07-26 RX ORDER — CYCLOBENZAPRINE HCL 5 MG
5 TABLET ORAL NIGHTLY PRN
Qty: 20 TABLET | Refills: 0 | Status: SHIPPED | OUTPATIENT
Start: 2021-07-26 | End: 2021-10-26

## 2021-08-24 ENCOUNTER — OFFICE VISIT (OUTPATIENT)
Dept: INTERNAL MEDICINE CLINIC | Facility: CLINIC | Age: 61
End: 2021-08-24
Payer: COMMERCIAL

## 2021-08-24 DIAGNOSIS — E78.00 HIGH CHOLESTEROL: ICD-10-CM

## 2021-08-24 DIAGNOSIS — I10 ESSENTIAL HYPERTENSION: ICD-10-CM

## 2021-08-24 DIAGNOSIS — M25.532 LEFT WRIST PAIN: ICD-10-CM

## 2021-08-24 DIAGNOSIS — Z00.00 ADULT GENERAL MEDICAL EXAM: ICD-10-CM

## 2021-08-24 DIAGNOSIS — Z72.0 TOBACCO ABUSE: ICD-10-CM

## 2021-08-24 DIAGNOSIS — I10 PRIMARY HYPERTENSION: ICD-10-CM

## 2021-08-24 DIAGNOSIS — Z12.5 PROSTATE CANCER SCREENING: ICD-10-CM

## 2021-08-24 DIAGNOSIS — F17.210 CIGARETTE SMOKER: ICD-10-CM

## 2021-08-24 DIAGNOSIS — Z71.85 VACCINE COUNSELING: Primary | ICD-10-CM

## 2021-08-24 DIAGNOSIS — M25.561 CHRONIC PAIN OF RIGHT KNEE: ICD-10-CM

## 2021-08-24 DIAGNOSIS — R73.9 HYPERGLYCEMIA: ICD-10-CM

## 2021-08-24 DIAGNOSIS — G89.29 CHRONIC PAIN OF RIGHT KNEE: ICD-10-CM

## 2021-08-24 LAB
BILIRUBIN: NEGATIVE
GLUCOSE (URINE DIPSTICK): NEGATIVE MG/DL
KETONES (URINE DIPSTICK): NEGATIVE MG/DL
LEUKOCYTES: NEGATIVE
MULTISTIX LOT#: NORMAL NUMERIC
NITRITE, URINE: NEGATIVE
OCCULT BLOOD: NEGATIVE
PH, URINE: 7 (ref 4.5–8)
PROTEIN (URINE DIPSTICK): NEGATIVE MG/DL
SPECIFIC GRAVITY: 1.02 (ref 1–1.03)
URINE-COLOR: YELLOW
UROBILINOGEN,SEMI-QN: 0.2 MG/DL (ref 0–1.9)

## 2021-08-24 PROCEDURE — 81003 URINALYSIS AUTO W/O SCOPE: CPT | Performed by: INTERNAL MEDICINE

## 2021-08-24 PROCEDURE — 3079F DIAST BP 80-89 MM HG: CPT | Performed by: INTERNAL MEDICINE

## 2021-08-24 PROCEDURE — 99396 PREV VISIT EST AGE 40-64: CPT | Performed by: INTERNAL MEDICINE

## 2021-08-24 PROCEDURE — 99406 BEHAV CHNG SMOKING 3-10 MIN: CPT | Performed by: INTERNAL MEDICINE

## 2021-08-24 PROCEDURE — 3008F BODY MASS INDEX DOCD: CPT | Performed by: INTERNAL MEDICINE

## 2021-08-24 PROCEDURE — 3077F SYST BP >= 140 MM HG: CPT | Performed by: INTERNAL MEDICINE

## 2021-08-24 RX ORDER — BUPROPION HYDROCHLORIDE 150 MG/1
150 TABLET, EXTENDED RELEASE ORAL 2 TIMES DAILY
Qty: 180 TABLET | Refills: 1 | Status: SHIPPED | OUTPATIENT
Start: 2021-08-24 | End: 2021-12-07

## 2021-08-24 RX ORDER — GABAPENTIN 600 MG/1
TABLET ORAL
Qty: 270 TABLET | Refills: 1 | Status: SHIPPED | OUTPATIENT
Start: 2021-08-24 | End: 2021-12-07

## 2021-08-24 RX ORDER — TADALAFIL 20 MG/1
20 TABLET ORAL
Qty: 20 TABLET | Refills: 0 | Status: SHIPPED | OUTPATIENT
Start: 2021-08-24 | End: 2021-09-01

## 2021-08-24 RX ORDER — MELOXICAM 7.5 MG/1
7.5 TABLET ORAL DAILY
Qty: 30 TABLET | Refills: 1 | Status: SHIPPED | OUTPATIENT
Start: 2021-08-24

## 2021-08-24 RX ORDER — TIZANIDINE 4 MG/1
4 TABLET ORAL NIGHTLY
Qty: 30 TABLET | Refills: 1 | Status: SHIPPED | OUTPATIENT
Start: 2021-08-24 | End: 2021-08-29

## 2021-08-24 RX ORDER — ATORVASTATIN CALCIUM 40 MG/1
TABLET, FILM COATED ORAL
Qty: 90 TABLET | Refills: 1 | Status: SHIPPED | OUTPATIENT
Start: 2021-08-24 | End: 2021-12-07

## 2021-08-24 RX ORDER — MELATONIN
1000 DAILY
COMMUNITY
End: 2021-12-07

## 2021-08-25 VITALS
BODY MASS INDEX: 25.07 KG/M2 | HEART RATE: 60 BPM | HEIGHT: 68 IN | SYSTOLIC BLOOD PRESSURE: 148 MMHG | RESPIRATION RATE: 17 BRPM | TEMPERATURE: 97 F | WEIGHT: 165.38 LBS | DIASTOLIC BLOOD PRESSURE: 88 MMHG

## 2021-08-25 NOTE — PROGRESS NOTES
HPI:    Patient ID: Cristobal France is a 64year old male. Cristobal France is a 64year old male who presents for a complete physical exam.   HPI:     Hypertension  Patient is here for follow up of hypertension. BP at home: not check.   Both he 08/12/2017 70   11/19/2016 70   05/21/2016 147 (H)   02/20/2015 165 (H)     AST (U/L)   Date Value   11/16/2019 14 (L)   04/23/2018 31   04/01/2018 20   11/19/2016 22   02/20/2015 18     ALT (U/L)   Date Value   11/16/2019 20   04/23/2018 99 (H)   04/01/ Diagnosis Date   • High cholesterol    • Tobacco abuse       Past Surgical History:   Procedure Laterality Date   • COLONOSCOPY  07/2019   • COLONOSCOPY N/A 7/25/2019    Procedure: COLONOSCOPY;  Surgeon: Brandi Jamison MD;  Location: [address] [place] [address] ENDOSCOPY   • 11/16/2019 08:32 AM    CREATSERUM 0.97 11/16/2019 08:32 AM    CA 9.0 11/16/2019 08:32 AM    AST 14 (L) 11/16/2019 08:32 AM    ALT 20 11/16/2019 08:32 AM    TSH 0.758 11/16/2019 08:32 AM    T4F 0.87 04/01/2018 09:45 AM        Lab Results   Component Value D negatives include no fever, itching, numbness or tingling. The symptoms are aggravated by activity. Treatments tried: Wears brace and helps. The treatment provided mild relief. Family history does not include gout or rheumatoid arthritis.  His past medical Psychiatric/Behavioral: Negative. Negative for agitation, behavioral problems, confusion, decreased concentration, dysphoric mood, hallucinations, self-injury, sleep disturbance and suicidal ideas.  The patient is not nervous/anxious and is not hyperacti NAUSEA AND VOMITING    HISTORY:  Past Medical History:   Diagnosis Date   • High cholesterol    • Tobacco abuse       Past Surgical History:   Procedure Laterality Date   • COLONOSCOPY  07/2019   • COLONOSCOPY N/A 7/25/2019    Procedure: Herminia Cisneros hemotympanum. Tympanic membrane is not injected, scarred, perforated, erythematous, retracted or bulging. Tympanic membrane has normal mobility. Left Ear: Hearing, tympanic membrane, ear canal and external ear normal. No decreased hearing noted.  No la left side. Dorsalis pedis pulses are 2+ on the right side and 2+ on the left side. Posterior tibial pulses are 2+ on the right side and 2+ on the left side.       Heart sounds: Normal heart sounds, S1 normal and S2 normal.   Pulmonary:      Ef Anterior drawer test negative. Posterior drawer test negative. Right lower leg: No edema. Left lower leg: No edema. Right ankle: No swelling, deformity, ecchymosis or lacerations. No tenderness. Normal range of motion.  Anterior drawer test n blood.     Prostate cancer screening Check blood. Adult general medical exam Check urine. Left wrist pain Brandi changes. Check blood. Chronic pain of right knee Check Xrays. Can try mobic 7.5 mg a day. Can try tizantidine 4 mg at night.  Discuss asked about/assessed behavioral health risk and factors affecting choice of behavior change goals/methods. Specifically I asked about readiness to quit tobacco.  Advise:  We gave clear, specific, and personalized behavior change advice, including informati

## 2021-08-25 NOTE — PATIENT INSTRUCTIONS
Physical Exam         ASSESSMENT/PLAN:   Vaccine counseling  (primary encounter diagnosis) Check on shingrix vaccine. PCV 23 info. Given. Tobacco abuse DW pt. Of quitting. Hyperglycemia Check blood. Primary hypertension ? White coat.  Careful wi Oral Tab 30 tablet 1     Sig: Take 1 tablet (7.5 mg total) by mouth daily. • tiZANidine 4 MG Oral Tab 30 tablet 1     Sig: Take 1 tablet (4 mg total) by mouth nightly for 5 days.        Imaging & Referrals:  XR KNEE (1 OR 2 VIEWS), RIGHT (CPT=73560) don't leave scars. But they can in severe cases. Or if someone has a weak immune system. How is shingles treated? For most people, shingles heals on its own in a few days or weeks.  But treatment is advised to help ease pain, speed healing, and reduce t your healthcare provider right away. Shingles can cause serious problems with vision, and even blindness. In very rare cases, shingles can also lead to pneumonia, hearing problems, brain inflammation, or even death.    When to get medical care  Call your h healthcare provider about the best time to get vaccinated. Ask which vaccine is best for you based on your age and health conditions. Isidro last reviewed this educational content on 6/1/2019  © 0927-6077 [employer]. All rights reserved. your dose. Call your doctor or health care professional if you are unable to keep an appointment. Where should I keep my medicine?   This vaccine is only given in a clinic, pharmacy, doctor's office, or other health care setting and will not be stored at  additional support. Getting Support for Quitting Smoking  You don’t have to go through the process of quitting smoking without support. Tell people you are quitting. The support of friends, coworkers, and family members can make a big difference.  Face-t can also help you keep on track. Ask your healthcare provider, local hospital, or public health department to put you in touch with a phone counselor. You may also have to deal with doubters when you decide to quit.  Explain to any doubters why you are quit

## 2021-08-28 ENCOUNTER — LAB ENCOUNTER (OUTPATIENT)
Dept: LAB | Age: 61
End: 2021-08-28
Attending: INTERNAL MEDICINE
Payer: COMMERCIAL

## 2021-08-28 DIAGNOSIS — Z12.5 PROSTATE CANCER SCREENING: ICD-10-CM

## 2021-08-28 DIAGNOSIS — R73.9 HYPERGLYCEMIA: ICD-10-CM

## 2021-08-28 DIAGNOSIS — E78.00 HIGH CHOLESTEROL: ICD-10-CM

## 2021-08-28 DIAGNOSIS — M25.532 LEFT WRIST PAIN: ICD-10-CM

## 2021-08-28 DIAGNOSIS — Z00.00 ADULT GENERAL MEDICAL EXAM: ICD-10-CM

## 2021-08-28 LAB
ALBUMIN SERPL-MCNC: 3.8 G/DL (ref 3.4–5)
ALBUMIN/GLOB SERPL: 1.3 {RATIO} (ref 1–2)
ALP LIVER SERPL-CCNC: 72 U/L
ALT SERPL-CCNC: 26 U/L
ANION GAP SERPL CALC-SCNC: 3 MMOL/L (ref 0–18)
AST SERPL-CCNC: 15 U/L (ref 15–37)
BASOPHILS # BLD AUTO: 0.06 X10(3) UL (ref 0–0.2)
BASOPHILS NFR BLD AUTO: 0.9 %
BILIRUB SERPL-MCNC: 0.5 MG/DL (ref 0.1–2)
BUN BLD-MCNC: 13 MG/DL (ref 7–18)
BUN/CREAT SERPL: 13.8 (ref 10–20)
CALCIUM BLD-MCNC: 8.6 MG/DL (ref 8.5–10.1)
CHLORIDE SERPL-SCNC: 110 MMOL/L (ref 98–112)
CHOLEST SMN-MCNC: 135 MG/DL (ref ?–200)
CO2 SERPL-SCNC: 26 MMOL/L (ref 21–32)
COMPLEXED PSA SERPL-MCNC: 0.35 NG/ML (ref ?–4)
CREAT BLD-MCNC: 0.94 MG/DL
DEPRECATED RDW RBC AUTO: 41.4 FL (ref 35.1–46.3)
EOSINOPHIL # BLD AUTO: 0.16 X10(3) UL (ref 0–0.7)
EOSINOPHIL NFR BLD AUTO: 2.4 %
ERYTHROCYTE [DISTWIDTH] IN BLOOD BY AUTOMATED COUNT: 11.9 % (ref 11–15)
ERYTHROCYTE [SEDIMENTATION RATE] IN BLOOD: 14 MM/HR
EST. AVERAGE GLUCOSE BLD GHB EST-MCNC: 114 MG/DL (ref 68–126)
GLOBULIN PLAS-MCNC: 3 G/DL (ref 2.8–4.4)
GLUCOSE BLD-MCNC: 105 MG/DL (ref 70–99)
HBA1C MFR BLD HPLC: 5.6 % (ref ?–5.7)
HCT VFR BLD AUTO: 44.6 %
HDLC SERPL-MCNC: 33 MG/DL (ref 40–59)
HGB BLD-MCNC: 15 G/DL
IMM GRANULOCYTES # BLD AUTO: 0.01 X10(3) UL (ref 0–1)
IMM GRANULOCYTES NFR BLD: 0.2 %
LDLC SERPL CALC-MCNC: 81 MG/DL (ref ?–100)
LYMPHOCYTES # BLD AUTO: 1.63 X10(3) UL (ref 1–4)
LYMPHOCYTES NFR BLD AUTO: 24.8 %
M PROTEIN MFR SERPL ELPH: 6.8 G/DL (ref 6.4–8.2)
MCH RBC QN AUTO: 31.4 PG (ref 26–34)
MCHC RBC AUTO-ENTMCNC: 33.6 G/DL (ref 31–37)
MCV RBC AUTO: 93.3 FL
MONOCYTES # BLD AUTO: 0.73 X10(3) UL (ref 0.1–1)
MONOCYTES NFR BLD AUTO: 11.1 %
NEUTROPHILS # BLD AUTO: 3.98 X10 (3) UL (ref 1.5–7.7)
NEUTROPHILS # BLD AUTO: 3.98 X10(3) UL (ref 1.5–7.7)
NEUTROPHILS NFR BLD AUTO: 60.6 %
NONHDLC SERPL-MCNC: 102 MG/DL (ref ?–130)
OSMOLALITY SERPL CALC.SUM OF ELEC: 288 MOSM/KG (ref 275–295)
PATIENT FASTING Y/N/NP: YES
PATIENT FASTING Y/N/NP: YES
PLATELET # BLD AUTO: 170 10(3)UL (ref 150–450)
POTASSIUM SERPL-SCNC: 4.6 MMOL/L (ref 3.5–5.1)
RBC # BLD AUTO: 4.78 X10(6)UL
RHEUMATOID FACT SERPL-ACNC: <10 IU/ML (ref ?–15)
SODIUM SERPL-SCNC: 139 MMOL/L (ref 136–145)
TRIGL SERPL-MCNC: 117 MG/DL (ref 30–149)
TSI SER-ACNC: 0.85 MIU/ML (ref 0.36–3.74)
VLDLC SERPL CALC-MCNC: 18 MG/DL (ref 0–30)
WBC # BLD AUTO: 6.6 X10(3) UL (ref 4–11)

## 2021-08-28 PROCEDURE — 86200 CCP ANTIBODY: CPT

## 2021-08-28 PROCEDURE — 83036 HEMOGLOBIN GLYCOSYLATED A1C: CPT

## 2021-08-28 PROCEDURE — 86038 ANTINUCLEAR ANTIBODIES: CPT

## 2021-08-28 PROCEDURE — 80053 COMPREHEN METABOLIC PANEL: CPT

## 2021-08-28 PROCEDURE — 86431 RHEUMATOID FACTOR QUANT: CPT

## 2021-08-28 PROCEDURE — 85025 COMPLETE CBC W/AUTO DIFF WBC: CPT

## 2021-08-28 PROCEDURE — 80061 LIPID PANEL: CPT

## 2021-08-28 PROCEDURE — 36415 COLL VENOUS BLD VENIPUNCTURE: CPT

## 2021-08-28 PROCEDURE — 84443 ASSAY THYROID STIM HORMONE: CPT

## 2021-08-28 PROCEDURE — 85652 RBC SED RATE AUTOMATED: CPT

## 2021-08-30 LAB — NUCLEAR IGG TITR SER IF: NEGATIVE {TITER}

## 2021-08-31 LAB — CCP IGG SERPL-ACNC: 0.7 U/ML (ref 0–6.9)

## 2021-09-01 RX ORDER — TADALAFIL 20 MG/1
20 TABLET ORAL
Qty: 20 TABLET | Refills: 0 | Status: SHIPPED | OUTPATIENT
Start: 2021-09-01 | End: 2021-10-21

## 2021-09-01 RX ORDER — TADALAFIL 5 MG/1
TABLET ORAL
Qty: 10 TABLET | Refills: 0 | Status: SHIPPED | OUTPATIENT
Start: 2021-09-01 | End: 2021-09-01 | Stop reason: DRUGHIGH

## 2021-09-01 NOTE — TELEPHONE ENCOUNTER
Patient states that the Tadalafil was sent to the wrong pharmacy. He states that he would like this medication to be sent to [facility] in [place]. [facility] sent refill request for Tadalafil 5 mg however patient states that is the wrong prescription.  Gerald Jalloh

## 2021-09-01 NOTE — TELEPHONE ENCOUNTER
Refill passed per [facility] protocol.    Requested Prescriptions   Pending Prescriptions Disp Refills    TADALAFIL 5 MG Oral Tab [Pharmacy Med Name: Tadalafil 5 Mg Tab Bure] 10 tablet 0     Sig: As needed BY MOUTH        Genitourinary Medications Pass

## 2021-10-21 RX ORDER — TADALAFIL 20 MG/1
20 TABLET ORAL
Qty: 20 TABLET | Refills: 0 | Status: SHIPPED | OUTPATIENT
Start: 2021-10-21 | End: 2021-10-26

## 2021-10-26 RX ORDER — TADALAFIL 20 MG/1
20 TABLET ORAL
Qty: 20 TABLET | Refills: 0 | Status: SHIPPED | OUTPATIENT
Start: 2021-10-26 | End: 2021-12-20

## 2021-10-26 NOTE — TELEPHONE ENCOUNTER
Please review; protocol failed/no protocol    Requested Prescriptions   Pending Prescriptions Disp Refills    cyclobenzaprine 5 MG Oral Tab 20 tablet 0     Sig: Take 1 tablet (5 mg total) by mouth nightly as needed for Muscle spasms.         There is no refill protocol information for this order            Recent Outpatient Visits              2 months ago Vaccine counseling    [facility], Ang Puente MD    Office Visit    7 months ago Left wrist pain    Heladio Prescott MD    Office Visit    8 months ago Neck pain    [facility], Mando Mesa MD    Office Visit    1 year ago [employer], [address], Ang Puente MD    [facility]    1 year ago [employer], [address], Ang Puente MD    Office Visit             Future Appointments         Provider Department Appt Notes    In 1 month Rocael Palacios MD [facility], [facility], [facility] 3 month follow up / scheduled by spouse

## 2021-10-27 RX ORDER — CYCLOBENZAPRINE HCL 5 MG
5 TABLET ORAL NIGHTLY PRN
Qty: 20 TABLET | Refills: 0 | Status: SHIPPED | OUTPATIENT
Start: 2021-10-27 | End: 2021-12-07 | Stop reason: ALTCHOICE

## 2021-10-28 ENCOUNTER — TELEPHONE (OUTPATIENT)
Dept: INTERNAL MEDICINE CLINIC | Facility: CLINIC | Age: 61
End: 2021-10-28

## 2021-10-28 RX ORDER — TADALAFIL 20 MG/1
20 TABLET ORAL
Qty: 20 TABLET | Refills: 0 | Status: CANCELLED | OUTPATIENT
Start: 2021-10-28

## 2021-10-29 NOTE — TELEPHONE ENCOUNTER
Macho Goodman CMA  Em Rn Triage 15 hours ago (4:30 PM)     NF    Prior Authorization:TADALAFIL 20MG TAB   Patient ID: [id number]   Please call plan at [phone] to initiate a prior authorization.     Routing comment

## 2021-11-12 ENCOUNTER — TELEPHONE (OUTPATIENT)
Dept: INTERNAL MEDICINE CLINIC | Facility: CLINIC | Age: 61
End: 2021-11-12

## 2021-11-12 NOTE — TELEPHONE ENCOUNTER
Patient Education        Pinkeye: Care Instructions  Your Care Instructions     Pinkeye is redness and swelling of the eye surface and the conjunctiva (the lining of the eyelid and the covering of the white part of the eye). Pinkeye is also called conjunctivitis. Pinkeye is often caused by infection with bacteria or a virus. Dry air, allergies, smoke, and chemicals are other common causes. Pinkeye often clears on its own in 7 to 10 days. Antibiotics only help if the pinkeye is caused by bacteria. Pinkeye caused by infection spreads easily. If an allergy or chemical is causing pinkeye, it will not go away unless you can avoid whatever is causing it. Follow-up care is a key part of your treatment and safety. Be sure to make and go to all appointments, and call your doctor if you are having problems. It's also a good idea to know your test results and keep a list of the medicines you take. How can you care for yourself at home? · Wash your hands often. Always wash them before and after you treat pinkeye or touch your eyes or face. · Use moist cotton or a clean, wet cloth to remove crust. Wipe from the inside corner of the eye to the outside. Use a clean part of the cloth for each wipe. · Put cold or warm wet cloths on your eye a few times a day if the eye hurts. · Do not wear contact lenses or eye makeup until the pinkeye is gone. Throw away any eye makeup you were using when you got pinkeye. Clean your contacts and storage case. If you wear disposable contacts, use a new pair when your eye has cleared and it is safe to wear contacts again. · If the doctor gave you antibiotic ointment or eyedrops, use them as directed. Use the medicine for as long as instructed, even if your eye starts looking better soon. Keep the bottle tip clean, and do not let it touch the eye area. · To put in eyedrops or ointment:  ? Tilt your head back, and pull your lower eyelid down with one finger. ?  Drop or squirt the medicine Yudith Parada, from [employer] is calling to confirm fax sent was received this morning. Form needs to be completed.   REF [id number] inside the lower lid. ? Close your eye for 30 to 60 seconds to let the drops or ointment move around. ? Do not touch the ointment or dropper tip to your eyelashes or any other surface. · Do not share towels, pillows, or washcloths while you have pinkeye. When should you call for help? Call your doctor now or seek immediate medical care if:    · You have pain in your eye, not just irritation on the surface.     · You have a change in vision or loss of vision.     · You have an increase in discharge from the eye.     · Your eye has not started to improve or begins to get worse within 48 hours after you start using antibiotics.     · Pinkeye lasts longer than 7 days. Watch closely for changes in your health, and be sure to contact your doctor if you have any problems. Where can you learn more? Go to https://Channel IQpepiceweb.[email or web address] org and sign in to your [employer] account. Enter Y392 in the Survios box to learn more about \"Pinkeye: Care Instructions. \"     If you do not have an account, please click on the \"Sign Up Now\" link. Current as of: July 1, 2021               Content Version: 13.0  © 5569-1554 Healthwise, Incorporated. Care instructions adapted under license by [facility] ([facility]). If you have questions about a medical condition or this instruction, always ask your healthcare professional. [place] [address] any warranty or liability for your use of this information. Patient Education        Constipation: Care Instructions  Your Care Instructions     Constipation means that you have a hard time passing stools (bowel movements). People pass stools from 3 times a day to once every 3 days. What is normal for you may be different. Constipation may occur with pain in the rectum and cramping. The pain may get worse when you try to pass stools. Sometimes there are small amounts of bright red blood on toilet paper or the surface of stools.  This is because of enlarged veins have blood in your stools. Watch closely for changes in your health, and be sure to contact your doctor if:    · Your constipation is getting worse.     · You do not get better as expected. Where can you learn more? Go to https://chchaneleb.[employer] org and sign in to your [employer] account. Enter [phone] in the Bread box to learn more about \"Constipation: Care Instructions. \"     If you do not have an account, please click on the \"Sign Up Now\" link. Current as of: July 1, 2021               Content Version: 13.0  © 5630-4323 Healthwise, Incorporated. Care instructions adapted under license by [facility] ([facility]). If you have questions about a medical condition or this instruction, always ask your healthcare professional. [address] any warranty or liability for your use of this information.

## 2021-11-12 NOTE — TELEPHONE ENCOUNTER
Paulette Braswell    11/12/21 9:35 AM  Note  Carmen Garcia, from [employer] is calling to confirm fax sent was received this morning.     Form needs to be completed.   REF [id number]              Form was filled out and faxed to [phone]

## 2021-11-15 NOTE — TELEPHONE ENCOUNTER
Message left for pt that PA has been approved for Tadalafil 20 mg, pt notified, pharmacy has also been notified see message from 10-28-21.

## 2021-12-07 ENCOUNTER — OFFICE VISIT (OUTPATIENT)
Dept: INTERNAL MEDICINE CLINIC | Facility: CLINIC | Age: 61
End: 2021-12-07
Payer: COMMERCIAL

## 2021-12-07 VITALS
RESPIRATION RATE: 17 BRPM | DIASTOLIC BLOOD PRESSURE: 71 MMHG | OXYGEN SATURATION: 98 % | TEMPERATURE: 98 F | HEART RATE: 82 BPM | WEIGHT: 169.5 LBS | SYSTOLIC BLOOD PRESSURE: 136 MMHG | HEIGHT: 68 IN | BODY MASS INDEX: 25.69 KG/M2

## 2021-12-07 DIAGNOSIS — R73.9 HYPERGLYCEMIA: ICD-10-CM

## 2021-12-07 DIAGNOSIS — Z72.0 TOBACCO ABUSE: ICD-10-CM

## 2021-12-07 DIAGNOSIS — F17.210 CIGARETTE SMOKER: ICD-10-CM

## 2021-12-07 DIAGNOSIS — E78.00 HIGH CHOLESTEROL: Primary | ICD-10-CM

## 2021-12-07 DIAGNOSIS — I10 PRIMARY HYPERTENSION: ICD-10-CM

## 2021-12-07 DIAGNOSIS — Z71.85 VACCINE COUNSELING: ICD-10-CM

## 2021-12-07 DIAGNOSIS — M25.532 LEFT WRIST PAIN: ICD-10-CM

## 2021-12-07 PROCEDURE — 90471 IMMUNIZATION ADMIN: CPT | Performed by: INTERNAL MEDICINE

## 2021-12-07 PROCEDURE — 90732 PPSV23 VACC 2 YRS+ SUBQ/IM: CPT | Performed by: INTERNAL MEDICINE

## 2021-12-07 PROCEDURE — 99214 OFFICE O/P EST MOD 30 MIN: CPT | Performed by: INTERNAL MEDICINE

## 2021-12-07 PROCEDURE — 3078F DIAST BP <80 MM HG: CPT | Performed by: INTERNAL MEDICINE

## 2021-12-07 PROCEDURE — 3008F BODY MASS INDEX DOCD: CPT | Performed by: INTERNAL MEDICINE

## 2021-12-07 PROCEDURE — 99406 BEHAV CHNG SMOKING 3-10 MIN: CPT | Performed by: INTERNAL MEDICINE

## 2021-12-07 PROCEDURE — 3075F SYST BP GE 130 - 139MM HG: CPT | Performed by: INTERNAL MEDICINE

## 2021-12-07 RX ORDER — BUPROPION HYDROCHLORIDE 150 MG/1
150 TABLET, EXTENDED RELEASE ORAL 2 TIMES DAILY
Qty: 180 TABLET | Refills: 1 | Status: SHIPPED | OUTPATIENT
Start: 2021-12-07

## 2021-12-07 RX ORDER — ALBUTEROL SULFATE 90 UG/1
2 AEROSOL, METERED RESPIRATORY (INHALATION) EVERY 6 HOURS PRN
Qty: 1 EACH | Refills: 1 | Status: SHIPPED | OUTPATIENT
Start: 2021-12-07 | End: 2021-12-20

## 2021-12-07 RX ORDER — GABAPENTIN 600 MG/1
TABLET ORAL
Qty: 270 TABLET | Refills: 1 | Status: SHIPPED | OUTPATIENT
Start: 2021-12-07 | End: 2021-12-07 | Stop reason: ALTCHOICE

## 2021-12-07 RX ORDER — ATORVASTATIN CALCIUM 40 MG/1
TABLET, FILM COATED ORAL
Qty: 90 TABLET | Refills: 1 | Status: SHIPPED | OUTPATIENT
Start: 2021-12-07 | End: 2022-01-17

## 2021-12-07 RX ORDER — METHYLPREDNISOLONE 4 MG/1
TABLET ORAL
Qty: 1 EACH | Refills: 0 | Status: SHIPPED | OUTPATIENT
Start: 2021-12-07

## 2021-12-07 RX ORDER — PREGABALIN 100 MG/1
100 CAPSULE ORAL 2 TIMES DAILY
Qty: 60 CAPSULE | Refills: 1 | Status: SHIPPED | OUTPATIENT
Start: 2021-12-07

## 2021-12-07 RX ORDER — MELATONIN
1000 DAILY
Qty: 90 TABLET | Refills: 1 | Status: SHIPPED | OUTPATIENT
Start: 2021-12-07

## 2021-12-08 NOTE — PROGRESS NOTES
HPI:    Patient ID: Aram Bauer is a 64year old male. Exercise level: somewhat active and has been following low salt diet. Weight has been stable.     Wt Readings from Last 3 Encounters:  12/07/21 : 169 lb 8 oz (76.9 kg)  08/24/21 : 165 lb 6 breath, wheezing and stridor. Cardiovascular: Negative for chest pain, palpitations and leg swelling. Gastrointestinal: Negative for diarrhea. Endocrine: Negative for cold intolerance, heat intolerance, polydipsia, polyphagia and polyuria.    Musculo predniSONE 10 MG Oral Tab Take 2 tablets (20 mg total) by mouth daily.  (Patient not taking: No sig reported) 10 tablet 0     Allergies:  Morphine                NAUSEA AND VOMITING    HISTORY:  Past Medical History:   Diagnosis Date   • High cholesterol pulses. Carotid pulses are 2+ on the right side and 2+ on the left side. Radial pulses are 2+ on the right side and 2+ on the left side. Dorsalis pedis pulses are 2+ on the right side and 2+ on the left side.         Posterior tibial Behavior: Behavior normal.              ASSESSMENT/PLAN:   High cholesterol  (primary encounter diagnosis) Check blood with next OV. Hyperglycemia Check blood with next OV. Tobacco abuse DW pt. Of quitting. Primary hypertension Good control.  Ca RTC 3 months for FU meds. Tobacco Cessation Documentation (Smoking and Smokeless included):    I had an in depth therapy session with Baljit Alegre about his tobacco use risks and options using the USPSTF's Five A's approach:    Ask: Ardith Abbot cerebrovascular accident

## 2021-12-08 NOTE — PATIENT INSTRUCTIONS
ASSESSMENT/PLAN:   High cholesterol  (primary encounter diagnosis) Check blood with next OV. Hyperglycemia Check blood with next OV. Tobacco abuse DW pt. Of quitting. Primary hypertension Good control.  Careful with diet and excercise at least 3 Smoking    Quitting smoking is the most important step you can take to improve your health. We're glad you have set a goal to improve your health. Quit Smoking Resources    In addition to medications, use the STAR plan to help you successfully quit.    · health and an improved sense of taste, as well as the money you will save from not smoking. Also:  Remove cigarettes from your home, car, or any other place where you stash them. Throw away all smoking materials, including matches, lighters, and ashtrays. instructions.       ·

## 2021-12-10 ENCOUNTER — TELEPHONE (OUTPATIENT)
Dept: INTERNAL MEDICINE CLINIC | Facility: CLINIC | Age: 61
End: 2021-12-10

## 2021-12-10 NOTE — TELEPHONE ENCOUNTER
----- Message from [address] sent at 12/10/2021  2:24 PM CST -----  Regarding: New medicine   Had to stop taking the methyiprednisolone tablets they made me sick

## 2021-12-11 NOTE — TELEPHONE ENCOUNTER
Clarify sick visit making him nauseous vomiting acid reflux. If it is acid reflux he can just do Pepcid twice a day over-the-counter while he is on it for 5 days. How is he feeling with the hand as the pain better. Does he need to see orthopedics?

## 2021-12-11 NOTE — TELEPHONE ENCOUNTER
Advised patient of Dr. Katy Randall note. Patient verbalized understanding  Patient states the methylPREDNISolone made him have nausea and he also felt tired.    Patient also stated the pain in his hand is the same and he has not scheduled an appointment to see

## 2021-12-20 RX ORDER — TADALAFIL 20 MG/1
20 TABLET ORAL
Qty: 20 TABLET | Refills: 0 | Status: SHIPPED | OUTPATIENT
Start: 2021-12-20 | End: 2022-02-12

## 2021-12-20 RX ORDER — ALBUTEROL SULFATE 90 UG/1
2 AEROSOL, METERED RESPIRATORY (INHALATION) EVERY 6 HOURS PRN
Qty: 1 EACH | Refills: 1 | Status: SHIPPED | OUTPATIENT
Start: 2021-12-20

## 2021-12-20 NOTE — TELEPHONE ENCOUNTER
Refill passed per [employer], [facility] protocol. Requested Prescriptions   Pending Prescriptions Disp Refills    Tadalafil 20 MG Oral Tab 20 tablet 0     Sig: Take 1 tablet (20 mg total) by mouth daily as needed for Erectile Dysfunction. Genitourinary Medications Passed - 12/20/2021  2:53 AM        Passed - Patient does not have pulmonary hypertension on problem list        Passed - Appointment in the past 12 or next 3 months           albuterol (PROAIR HFA) 108 (90 Base) MCG/ACT Inhalation Aero Soln 1 each 1     Sig: Inhale 2 puffs into the lungs every 6 (six) hours as needed for Wheezing.         Asthma & COPD Medication Protocol Passed - 12/20/2021  2:53 AM        Passed - Appointment in past 6 or next 3 months               Recent Outpatient Visits              1 week ago High cholesterol    Amanda Simons MD    Office Visit    3 months ago Vaccine counseling    Amanda Simons MD    Office Visit    9 months ago Left wrist pain    Josie Calabrese MD    Office Visit    9 months ago Neck pain    Josie Calabrese MD    Office Visit    1 year ago [employer], [address], Amanda Barron MD    [employer] [facility]            Future Appointments         Provider Department Appt Notes    In 11 months Andres Contreras MD [place] Carma Gibson, [facility], [address], [facility] annual physical

## 2021-12-30 ENCOUNTER — NURSE ONLY (OUTPATIENT)
Dept: INTERNAL MEDICINE CLINIC | Facility: CLINIC | Age: 61
End: 2021-12-30
Payer: COMMERCIAL

## 2021-12-30 DIAGNOSIS — Z23 NEED FOR SHINGLES VACCINE: Primary | ICD-10-CM

## 2021-12-30 PROCEDURE — 90750 HZV VACC RECOMBINANT IM: CPT | Performed by: INTERNAL MEDICINE

## 2021-12-30 PROCEDURE — 90471 IMMUNIZATION ADMIN: CPT | Performed by: INTERNAL MEDICINE

## 2021-12-30 NOTE — PROGRESS NOTES
Order was verified, along with patient's name, date of birth and injection received. Patient was given the 1st Shingrix vaccine on the left deltoid and patient tolerated the injection well.

## 2022-01-17 RX ORDER — ATORVASTATIN CALCIUM 40 MG/1
TABLET, FILM COATED ORAL
Qty: 90 TABLET | Refills: 1 | Status: SHIPPED | OUTPATIENT
Start: 2022-01-17 | End: 2022-07-23

## 2022-01-17 NOTE — TELEPHONE ENCOUNTER
Refill passed per [facility] protocol.     Requested Prescriptions   Pending Prescriptions Disp Refills    ATORVASTATIN 40 MG Oral Tab [Pharmacy Med Name: ATORVASTATIN 40MG TABLETS] 90 tablet 1     Sig: TAKE 1 TABLET BY MOUTH EVERY NIGHT        Cholesterol Medication Protocol Passed - 1/17/2022 10:24 AM        Passed - ALT in past 12 months        Passed - LDL in past 12 months        Passed - Last ALT < 80       Lab Results   Component Value Date    ALT 26 08/28/2021             Passed - Last LDL < 130     Lab Results   Component Value Date    LDL 81 08/28/2021               Passed - Appointment in past 12 or next 3 months              Future Appointments         Provider Department Appt Notes    In 6 months Benjamin Garrido MD [place] [facility] [employer] [facility], [address] [facility] annual physical            Recent Outpatient Visits              2 weeks ago Need for shingles vaccine    [facility] [address], [place]    Nurse Only    1 month ago [employer], [address], Dontae Mart MD    Office Visit    4 months ago Vaccine counseling    Dontae Whaley MD    Office Visit    10 months ago Left wrist pain    Kaylin Soto MD    Office Visit    10 months ago Neck pain    Mady Whaley MD    Office Visit

## 2022-02-12 RX ORDER — TADALAFIL 20 MG/1
20 TABLET ORAL
Qty: 20 TABLET | Refills: 0 | Status: SHIPPED | OUTPATIENT
Start: 2022-02-12 | End: 2022-04-04

## 2022-02-12 NOTE — TELEPHONE ENCOUNTER
Refill passed per [address] [facility] [place] [facility]. Requested Prescriptions   Pending Prescriptions Disp Refills    Tadalafil 20 MG Oral Tab 20 tablet 0     Sig: Take 1 tablet (20 mg total) by mouth daily as needed for Erectile Dysfunction.         Genitourinary Medications Passed - 2/12/2022 10:18 AM        Passed - Patient does not have pulmonary hypertension on problem list        Passed - Appointment in the past 12 or next 3 months            Recent Outpatient Visits              1 month ago Need for shingles vaccine    [address], [address], [place]    Nurse Only    2 months ago [facility]    [address] [facility], Mamadou Vicente MD    Office Visit    5 months ago Vaccine counseling    [address] [facility], Mamadou Vicente MD    Office Visit    11 months ago Left wrist pain    Dara Pineda MD    Office Visit    11 months ago Neck pain    [address] [facility], Yanely Duran MD    Office Visit          Future Appointments         Provider Department Appt Notes    In 10 months Bety Cortés MD [address] [place], [address], [place] annual physical

## 2022-02-17 ENCOUNTER — TELEPHONE (OUTPATIENT)
Dept: INTERNAL MEDICINE CLINIC | Facility: CLINIC | Age: 62
End: 2022-02-17

## 2022-02-17 NOTE — TELEPHONE ENCOUNTER
Patient's wife Sarah called requesting the 2nd dose for shingles vaccine. She states they both received the first dose on the same day. Please book appointment together.

## 2022-02-17 NOTE — TELEPHONE ENCOUNTER
Can you please schedule him? Thank you. He got the first one on 12/30/2021. The second one should be  between 3 to  6 months.

## 2022-02-22 RX ORDER — BUPROPION HYDROCHLORIDE 150 MG/1
TABLET, EXTENDED RELEASE ORAL
Qty: 180 TABLET | Refills: 1 | Status: SHIPPED | OUTPATIENT
Start: 2022-02-22

## 2022-02-22 RX ORDER — GABAPENTIN 100 MG/1
100 CAPSULE ORAL 3 TIMES DAILY
Refills: 0 | OUTPATIENT
Start: 2022-02-22

## 2022-02-22 NOTE — TELEPHONE ENCOUNTER
Bupropion Refill passed per [place] [employer] [place], [facility] protocol. Gabapentin refilled denied as this prescription was discontinued at the 12/2021 office visit.      Requested Prescriptions   Pending Prescriptions Disp Refills    BUPROPION 150 MG Oral Tablet 12 Hr [Pharmacy Med Name: BUPROPION SR 150MG TABLETS (12 H)] 180 tablet 1     Sig: TAKE 1 TABLET(150 MG) BY MOUTH TWICE DAILY        Psychiatric Non-Scheduled (Anti-Anxiety) Passed - 2/20/2022  4:53 PM        Passed - Appointment in last 6 or next 3 months           GABAPENTIN 600 MG Oral Tab [Pharmacy Med Name: GABAPENTIN 600MG TABLETS] 270 tablet 1     Sig: TAKE 1 TABLET BY MOUTH EVERY MORNING AND TAKE 2 TABLETS BY MOUTH EVERY NIGHT AT BEDTIME        Neurology Medications Passed - 2/20/2022  4:53 PM        Passed - Appointment in the past 6 or next 3 months              [unfilled]      [unfilled]

## 2022-04-04 ENCOUNTER — NURSE ONLY (OUTPATIENT)
Dept: INTERNAL MEDICINE CLINIC | Facility: CLINIC | Age: 62
End: 2022-04-04
Payer: COMMERCIAL

## 2022-04-04 DIAGNOSIS — Z23 NEED FOR VACCINATION: Primary | ICD-10-CM

## 2022-04-04 PROCEDURE — 90750 HZV VACC RECOMBINANT IM: CPT | Performed by: NURSE PRACTITIONER

## 2022-04-04 PROCEDURE — 90471 IMMUNIZATION ADMIN: CPT | Performed by: NURSE PRACTITIONER

## 2022-04-04 RX ORDER — TADALAFIL 20 MG/1
20 TABLET ORAL
Qty: 20 TABLET | Refills: 2 | Status: SHIPPED | OUTPATIENT
Start: 2022-04-04 | End: 2022-08-01

## 2022-07-23 RX ORDER — ATORVASTATIN CALCIUM 40 MG/1
TABLET, FILM COATED ORAL
Qty: 90 TABLET | Refills: 1 | Status: SHIPPED | OUTPATIENT
Start: 2022-07-23 | End: 2022-12-15

## 2022-08-01 RX ORDER — TADALAFIL 20 MG/1
20 TABLET ORAL
Qty: 20 TABLET | Refills: 2 | Status: SHIPPED | OUTPATIENT
Start: 2022-08-01 | End: 2022-11-27

## 2022-09-09 ENCOUNTER — TELEPHONE (OUTPATIENT)
Dept: INTERNAL MEDICINE CLINIC | Facility: CLINIC | Age: 62
End: 2022-09-09

## 2022-09-09 RX ORDER — GABAPENTIN 600 MG/1
TABLET ORAL
Qty: 270 TABLET | Refills: 1 | Status: SHIPPED | OUTPATIENT
Start: 2022-09-09 | End: 2023-03-30

## 2022-12-13 ENCOUNTER — OFFICE VISIT (OUTPATIENT)
Dept: INTERNAL MEDICINE CLINIC | Facility: CLINIC | Age: 62
End: 2022-12-13
Payer: COMMERCIAL

## 2022-12-13 VITALS
SYSTOLIC BLOOD PRESSURE: 122 MMHG | BODY MASS INDEX: 24.61 KG/M2 | TEMPERATURE: 97 F | WEIGHT: 162.38 LBS | RESPIRATION RATE: 16 BRPM | OXYGEN SATURATION: 98 % | HEART RATE: 71 BPM | DIASTOLIC BLOOD PRESSURE: 64 MMHG | HEIGHT: 68 IN

## 2022-12-13 DIAGNOSIS — R06.83 SNORING: ICD-10-CM

## 2022-12-13 DIAGNOSIS — Z71.85 VACCINE COUNSELING: ICD-10-CM

## 2022-12-13 DIAGNOSIS — R73.9 HYPERGLYCEMIA: ICD-10-CM

## 2022-12-13 DIAGNOSIS — M25.532 LEFT WRIST PAIN: ICD-10-CM

## 2022-12-13 DIAGNOSIS — I10 ESSENTIAL HYPERTENSION: ICD-10-CM

## 2022-12-13 DIAGNOSIS — G89.29 CHRONIC LEFT SHOULDER PAIN: ICD-10-CM

## 2022-12-13 DIAGNOSIS — M54.31 SCIATICA OF RIGHT SIDE: ICD-10-CM

## 2022-12-13 DIAGNOSIS — E78.00 HIGH CHOLESTEROL: Primary | ICD-10-CM

## 2022-12-13 DIAGNOSIS — Z00.00 ADULT GENERAL MEDICAL EXAM: ICD-10-CM

## 2022-12-13 DIAGNOSIS — M25.512 CHRONIC LEFT SHOULDER PAIN: ICD-10-CM

## 2022-12-13 DIAGNOSIS — Z72.0 TOBACCO ABUSE: ICD-10-CM

## 2022-12-13 DIAGNOSIS — Z12.5 PROSTATE CANCER SCREENING: ICD-10-CM

## 2022-12-13 DIAGNOSIS — I10 PRIMARY HYPERTENSION: ICD-10-CM

## 2022-12-13 LAB
APPEARANCE: CLEAR
BILIRUBIN: NEGATIVE
GLUCOSE (URINE DIPSTICK): NEGATIVE MG/DL
KETONES (URINE DIPSTICK): NEGATIVE MG/DL
LEUKOCYTES: NEGATIVE
MULTISTIX LOT#: NORMAL NUMERIC
NITRITE, URINE: NEGATIVE
OCCULT BLOOD: NEGATIVE
PH, URINE: 7 (ref 4.5–8)
PROTEIN (URINE DIPSTICK): NEGATIVE MG/DL
SPECIFIC GRAVITY: 1.02 (ref 1–1.03)
URINE-COLOR: YELLOW
UROBILINOGEN,SEMI-QN: 0.2 MG/DL (ref 0–1.9)

## 2022-12-13 PROCEDURE — 3008F BODY MASS INDEX DOCD: CPT | Performed by: INTERNAL MEDICINE

## 2022-12-13 PROCEDURE — 99396 PREV VISIT EST AGE 40-64: CPT | Performed by: INTERNAL MEDICINE

## 2022-12-13 PROCEDURE — 3074F SYST BP LT 130 MM HG: CPT | Performed by: INTERNAL MEDICINE

## 2022-12-13 PROCEDURE — 3078F DIAST BP <80 MM HG: CPT | Performed by: INTERNAL MEDICINE

## 2022-12-13 PROCEDURE — 81003 URINALYSIS AUTO W/O SCOPE: CPT | Performed by: INTERNAL MEDICINE

## 2022-12-13 PROCEDURE — 99215 OFFICE O/P EST HI 40 MIN: CPT | Performed by: INTERNAL MEDICINE

## 2022-12-13 RX ORDER — NAPROXEN 500 MG/1
500 TABLET ORAL 2 TIMES DAILY WITH MEALS
Qty: 60 TABLET | Refills: 1 | Status: SHIPPED | OUTPATIENT
Start: 2022-12-13

## 2023-01-21 ENCOUNTER — LAB ENCOUNTER (OUTPATIENT)
Dept: LAB | Age: 63
End: 2023-01-21
Attending: INTERNAL MEDICINE
Payer: COMMERCIAL

## 2023-01-21 ENCOUNTER — HOSPITAL ENCOUNTER (OUTPATIENT)
Dept: GENERAL RADIOLOGY | Age: 63
Discharge: HOME | End: 2023-01-21
Attending: INTERNAL MEDICINE
Payer: COMMERCIAL

## 2023-01-21 DIAGNOSIS — Z12.5 PROSTATE CANCER SCREENING: ICD-10-CM

## 2023-01-21 DIAGNOSIS — G89.29 CHRONIC LEFT SHOULDER PAIN: ICD-10-CM

## 2023-01-21 DIAGNOSIS — R73.9 HYPERGLYCEMIA: ICD-10-CM

## 2023-01-21 DIAGNOSIS — E78.00 HIGH CHOLESTEROL: ICD-10-CM

## 2023-01-21 DIAGNOSIS — M25.512 CHRONIC LEFT SHOULDER PAIN: ICD-10-CM

## 2023-01-21 DIAGNOSIS — Z00.00 ADULT GENERAL MEDICAL EXAM: ICD-10-CM

## 2023-01-21 LAB
ALBUMIN SERPL-MCNC: 3.9 G/DL (ref 3.4–5)
ALBUMIN/GLOB SERPL: 1.6 {RATIO} (ref 1–2)
ALP LIVER SERPL-CCNC: 77 U/L
ALT SERPL-CCNC: 22 U/L
ANION GAP SERPL CALC-SCNC: 6 MMOL/L (ref 0–18)
AST SERPL-CCNC: 15 U/L (ref 15–37)
BASOPHILS # BLD AUTO: 0.05 X10(3) UL (ref 0–0.2)
BASOPHILS NFR BLD AUTO: 0.8 %
BILIRUB SERPL-MCNC: 0.6 MG/DL (ref 0.1–2)
BUN BLD-MCNC: 13 MG/DL (ref 7–18)
BUN/CREAT SERPL: 15.1 (ref 10–20)
CALCIUM BLD-MCNC: 8.7 MG/DL (ref 8.5–10.1)
CHLORIDE SERPL-SCNC: 108 MMOL/L (ref 98–112)
CHOLEST SERPL-MCNC: 118 MG/DL (ref ?–200)
CO2 SERPL-SCNC: 27 MMOL/L (ref 21–32)
COMPLEXED PSA SERPL-MCNC: 0.35 NG/ML (ref ?–4)
CREAT BLD-MCNC: 0.86 MG/DL
DEPRECATED RDW RBC AUTO: 41 FL (ref 35.1–46.3)
EOSINOPHIL # BLD AUTO: 0.13 X10(3) UL (ref 0–0.7)
EOSINOPHIL NFR BLD AUTO: 2 %
ERYTHROCYTE [DISTWIDTH] IN BLOOD BY AUTOMATED COUNT: 11.9 % (ref 11–15)
EST. AVERAGE GLUCOSE BLD GHB EST-MCNC: 114 MG/DL (ref 68–126)
FASTING PATIENT LIPID ANSWER: YES
FASTING STATUS PATIENT QL REPORTED: YES
GFR SERPLBLD BASED ON 1.73 SQ M-ARVRAT: 98 ML/MIN/1.73M2 (ref 60–?)
GLOBULIN PLAS-MCNC: 2.5 G/DL (ref 2.8–4.4)
GLUCOSE BLD-MCNC: 106 MG/DL (ref 70–99)
HBA1C MFR BLD: 5.6 % (ref ?–5.7)
HCT VFR BLD AUTO: 42.1 %
HDLC SERPL-MCNC: 37 MG/DL (ref 40–59)
HGB BLD-MCNC: 14.4 G/DL
IMM GRANULOCYTES # BLD AUTO: 0.01 X10(3) UL (ref 0–1)
IMM GRANULOCYTES NFR BLD: 0.2 %
LDLC SERPL CALC-MCNC: 63 MG/DL (ref ?–100)
LYMPHOCYTES # BLD AUTO: 1.82 X10(3) UL (ref 1–4)
LYMPHOCYTES NFR BLD AUTO: 27.7 %
MCH RBC QN AUTO: 31.7 PG (ref 26–34)
MCHC RBC AUTO-ENTMCNC: 34.2 G/DL (ref 31–37)
MCV RBC AUTO: 92.7 FL
MONOCYTES # BLD AUTO: 0.64 X10(3) UL (ref 0.1–1)
MONOCYTES NFR BLD AUTO: 9.7 %
NEUTROPHILS # BLD AUTO: 3.93 X10 (3) UL (ref 1.5–7.7)
NEUTROPHILS # BLD AUTO: 3.93 X10(3) UL (ref 1.5–7.7)
NEUTROPHILS NFR BLD AUTO: 59.6 %
NONHDLC SERPL-MCNC: 81 MG/DL (ref ?–130)
OSMOLALITY SERPL CALC.SUM OF ELEC: 293 MOSM/KG (ref 275–295)
PLATELET # BLD AUTO: 169 10(3)UL (ref 150–450)
POTASSIUM SERPL-SCNC: 4.3 MMOL/L (ref 3.5–5.1)
PROT SERPL-MCNC: 6.4 G/DL (ref 6.4–8.2)
RBC # BLD AUTO: 4.54 X10(6)UL
SODIUM SERPL-SCNC: 141 MMOL/L (ref 136–145)
TRIGL SERPL-MCNC: 95 MG/DL (ref 30–149)
TSI SER-ACNC: 0.8 MIU/ML (ref 0.36–3.74)
VLDLC SERPL CALC-MCNC: 14 MG/DL (ref 0–30)
WBC # BLD AUTO: 6.6 X10(3) UL (ref 4–11)

## 2023-01-21 PROCEDURE — 85025 COMPLETE CBC W/AUTO DIFF WBC: CPT

## 2023-01-21 PROCEDURE — 84443 ASSAY THYROID STIM HORMONE: CPT

## 2023-01-21 PROCEDURE — 73030 X-RAY EXAM OF SHOULDER: CPT | Performed by: INTERNAL MEDICINE

## 2023-01-21 PROCEDURE — 83036 HEMOGLOBIN GLYCOSYLATED A1C: CPT

## 2023-01-21 PROCEDURE — 36415 COLL VENOUS BLD VENIPUNCTURE: CPT

## 2023-01-21 PROCEDURE — 80061 LIPID PANEL: CPT

## 2023-01-21 PROCEDURE — 80053 COMPREHEN METABOLIC PANEL: CPT

## 2023-03-25 ENCOUNTER — PATIENT MESSAGE (OUTPATIENT)
Dept: INTERNAL MEDICINE CLINIC | Facility: CLINIC | Age: 63
End: 2023-03-25

## 2023-03-27 NOTE — TELEPHONE ENCOUNTER
From: Dilip Bah  To: Kian Key MD  Sent: 3/25/2023 11:04 AM CDT  Subject: Colonoscopy    Do they do colonoscopys on Saturday's

## 2023-08-23 RX ORDER — ATORVASTATIN CALCIUM 40 MG/1
TABLET, FILM COATED ORAL
Qty: 90 TABLET | Refills: 3 | Status: SHIPPED | OUTPATIENT
Start: 2023-08-23

## 2023-08-23 NOTE — TELEPHONE ENCOUNTER
Refill passed per [place] [employer] protocol.   Requested Prescriptions   Pending Prescriptions Disp Refills    ATORVASTATIN 40 MG Oral Tab [Pharmacy Med Name: ATORVASTATIN 40MG TABLETS] 90 tablet 1     Sig: TAKE 1 TABLET BY MOUTH EVERY NIGHT       Cholesterol Medication Protocol Passed - 8/22/2023  6:53 AM        Passed - ALT in past 12 months        Passed - LDL in past 12 months        Passed - Last ALT < 80     Lab Results   Component Value Date    ALT 22 01/21/2023             Passed - Last LDL < 130     Lab Results   Component Value Date    LDL 63 01/21/2023             Passed - In person appointment or virtual visit in the past 12 mos or appointment in next 3 mos     Recent Outpatient Visits              8 months ago High cholesterol    [address], [address], Mary Urbina MD    Office Visit    1 year ago Need for vaccination    [address], [address], [place]    Nurse Only    1 year ago Need for shingles vaccine    [facility], [address], [place]    Nurse Only    1 year ago [employer]    [facility]    [facility]    1 year ago Vaccine counseling    [address] [place], [place] [address]Mary MD    Office Visit                         Recent Outpatient Visits              8 months ago High cholesterol    [facility]    [facility] Visit    1 year ago Need for vaccination    [address], [address], [place]    Nurse Only    1 year ago Need for shingles vaccine    [address], [address], [place]    Nurse Only    1 year ago [facility], [address], Mary Urbina MD    Office Visit    1 year ago Vaccine counseling    [address] [place], [place] [address]Mary MD Office Visit

## 2023-08-24 RX ORDER — ATORVASTATIN CALCIUM 40 MG/1
TABLET, FILM COATED ORAL
Qty: 90 TABLET | Refills: 1 | OUTPATIENT
Start: 2023-08-24

## 2023-08-29 RX ORDER — TADALAFIL 20 MG/1
20 TABLET ORAL
Qty: 20 TABLET | Refills: 2 | Status: SHIPPED | OUTPATIENT
Start: 2023-08-29

## 2023-09-09 RX ORDER — TADALAFIL 20 MG/1
20 TABLET ORAL
Qty: 20 TABLET | Refills: 2 | OUTPATIENT
Start: 2023-09-09

## 2023-09-10 RX ORDER — TADALAFIL 20 MG/1
20 TABLET ORAL
Qty: 20 TABLET | Refills: 2 | Status: SHIPPED | OUTPATIENT
Start: 2023-09-10

## 2023-09-10 NOTE — TELEPHONE ENCOUNTER
Refill sent to updated pharmacy per written order below. Previous Prescripton:     Disp Refills Start     Tadalafil 20 MG Oral Tab 20 tablet 2 8/29/2023     Sig - Route: Take 1 tablet (20 mg total) by mouth daily as needed for Erectile Dysfunction. - Oral    Sent to pharmacy as: Tadalafil 20 MG Oral Tablet    E-Prescribing Status: Receipt confirmed by pharmacy (8/29/2023 11:19 AM CDT)    No prior authorization was found for this prescription.     Found prior authorization for another prescription for the same medication: [id number] - [address], [address] [facility] [phone], [phone]

## 2023-10-23 ENCOUNTER — E-VISIT (OUTPATIENT)
Dept: TELEHEALTH | Age: 63
End: 2023-10-23
Payer: COMMERCIAL

## 2023-10-23 DIAGNOSIS — B34.9 VIRAL SYNDROME: Primary | ICD-10-CM

## 2023-10-23 NOTE — PATIENT INSTRUCTIONS
-If you develop shortness of breath, fevers, trouble breathing, or wheezing, please go to one of our immediate cares for a visit.  -Cool mist humidifier  -Tea with honey  -May continue coricidin  -Flonase

## 2023-10-23 NOTE — PROGRESS NOTES
HPI:  Benjamin Kuhn is a 61year old male who presents for an evisit. See [employer] above. Current Outpatient Medications   Medication Sig Dispense Refill    Tadalafil 20 MG Oral Tab Take 1 tablet (20 mg total) by mouth daily as needed for Erectile Dysfunction. 20 tablet 2    atorvastatin 40 MG Oral Tab TAKE 1 TABLET BY MOUTH EVERY NIGHT 90 tablet 3    naproxen 500 MG Oral Tab Take 1 tablet (500 mg total) by mouth 2 (two) times daily with meals. 180 tablet 1    gabapentin 600 MG Oral Tab TAKE 1 TABLET BY MOUTH EVERY MORNING THEN TAKE 2 TABLET BY MOUTH EVERY NIGHT AT BEDTIME 270 tablet 3    buPROPion  MG Oral Tablet 12 Hr Take 1 tablet (150 mg total) by mouth 2 (two) times daily. 180 tablet 1    albuterol (PROAIR HFA) 108 (90 Base) MCG/ACT Inhalation Aero Soln Inhale 2 puffs into the lungs every 6 (six) hours as needed for Wheezing. 1 each 1    cyanocobalamine 1000 MCG Oral Tab Take 1 tablet (1,000 mcg total) by mouth daily. 90 tablet 1    methylPREDNISolone (MEDROL) 4 MG Oral Tablet Therapy Pack As directed. 1 each 0    pregabalin (LYRICA) 100 MG Oral Cap Take 1 capsule (100 mg total) by mouth 2 (two) times daily. 60 capsule 1    Meloxicam 7.5 MG Oral Tab Take 1 tablet (7.5 mg total) by mouth daily. (Patient not taking: No sig reported) 30 tablet 1    cyclobenzaprine 10 MG Oral Tab Take 1 tablet (10 mg total) by mouth nightly. (Patient not taking: No sig reported) 60 tablet 1    predniSONE 10 MG Oral Tab Take 2 tablets (20 mg total) by mouth daily. (Patient not taking: No sig reported) 10 tablet 0    ASPIRIN 81 OR Take by mouth.        Past Medical History:   Diagnosis Date    High cholesterol     Tobacco abuse      Past Surgical History:   Procedure Laterality Date    COLONOSCOPY  07/2019    COLONOSCOPY N/A 7/25/2019    Procedure: COLONOSCOPY;  Surgeon: Franklin Martinez MD;  Location: [facility] ENDOSCOPY    EXCIS INFRATENT BRAIN TUMOR      LAMINECTOMY  2001       Social History Socioeconomic History    Marital status:    Tobacco Use    Smoking status: Every Day     Packs/day: 1.00     Years: 40.00     Additional pack years: 0.00     Total pack years: 40.00     Types: Cigarettes    Smokeless tobacco: Former    Tobacco comments:     tried quitting 4/1/18; back to 1PPD   Substance and Sexual Activity    Alcohol use: No     Alcohol/week: 0.0 standard drinks of alcohol    Drug use: No   Other Topics Concern    Caffeine Concern Yes     Comment: Coffee          No results found for this or any previous visit (from the past 24 hour(s)). ASSESSMENT AND PLAN:  Michael Ontiveros was seen today for cold. Diagnoses and all orders for this visit:    Viral syndrome  -     Online E&M 5-10 Min          ASSESSMENT:   Viral syndrome  (primary encounter diagnosis)    PLAN: Meds as below. See patient Instructions  -6 minutes spent with patient. Meds & Refills for this Visit:  Requested Prescriptions      No prescriptions requested or ordered in this encounter       Risks, benefits, and side effects of medication explained and discussed. Patient Instructions   -If you develop shortness of breath, fevers, trouble breathing, or wheezing, please go to one of our immediate cares for a visit.  -Cool mist humidifier  -Tea with honey  -May continue coricidin  -Flonase        The patient indicates understanding of these issues and agrees to the plan. The patient is asked to return if sx's persist or worsen. Benjamin Kuhn advised to follow CDC guidelines for self isolation and symptomatic treatment as outlined on CDC Patient Guidelines. Benjamin Kuhn understands evisit evaluation is not a substitute for face-to-face examination or emergency care. Patient advised to go to ER or call 911 for worsening symptoms or acute distress.

## 2023-10-25 ENCOUNTER — APPOINTMENT (OUTPATIENT)
Dept: GENERAL RADIOLOGY | Age: 63
End: 2023-10-25
Attending: NURSE PRACTITIONER

## 2023-10-25 ENCOUNTER — HOSPITAL ENCOUNTER (OUTPATIENT)
Age: 63
Discharge: HOME | End: 2023-10-25

## 2023-10-25 VITALS
HEART RATE: 82 BPM | SYSTOLIC BLOOD PRESSURE: 149 MMHG | RESPIRATION RATE: 18 BRPM | TEMPERATURE: 98 F | DIASTOLIC BLOOD PRESSURE: 79 MMHG | OXYGEN SATURATION: 97 %

## 2023-10-25 DIAGNOSIS — R19.7 DIARRHEA, UNSPECIFIED TYPE: ICD-10-CM

## 2023-10-25 DIAGNOSIS — R05.9 COUGH: Primary | ICD-10-CM

## 2023-10-25 LAB — SARS-COV-2 RNA RESP QL NAA+PROBE: NOT DETECTED

## 2023-10-25 PROCEDURE — U0002 COVID-19 LAB TEST NON-CDC: HCPCS | Performed by: NURSE PRACTITIONER

## 2023-10-25 PROCEDURE — 71046 X-RAY EXAM CHEST 2 VIEWS: CPT | Performed by: NURSE PRACTITIONER

## 2023-10-25 PROCEDURE — 99213 OFFICE O/P EST LOW 20 MIN: CPT | Performed by: NURSE PRACTITIONER

## 2023-10-25 PROCEDURE — 94640 AIRWAY INHALATION TREATMENT: CPT | Performed by: NURSE PRACTITIONER

## 2023-10-25 RX ORDER — IPRATROPIUM BROMIDE AND ALBUTEROL SULFATE 2.5; .5 MG/3ML; MG/3ML
3 SOLUTION RESPIRATORY (INHALATION) ONCE
Status: COMPLETED | OUTPATIENT
Start: 2023-10-25 | End: 2023-10-25

## 2023-10-25 RX ORDER — BENZONATATE 100 MG/1
100 CAPSULE ORAL 3 TIMES DAILY PRN
Qty: 10 CAPSULE | Refills: 0 | Status: SHIPPED | OUTPATIENT
Start: 2023-10-25

## 2023-10-25 RX ORDER — PREDNISONE 20 MG/1
40 TABLET ORAL DAILY
Qty: 10 TABLET | Refills: 0 | Status: SHIPPED | OUTPATIENT
Start: 2023-10-25 | End: 2023-10-30

## 2023-10-25 NOTE — DISCHARGE INSTRUCTIONS
COVID test is negative. No pneumonia seen on the x-ray. Use over-the-counter cough medicine or the Tessalon that was sent to the pharmacy. Take the steroid daily. Use the inhaler as needed for noisy breathing, cough, shortness of breath. Increase water intake. Suck on lozenges. Seek help for smoking cessation.   Follow-up with your primary doctor for persistent symptoms

## 2023-11-02 RX ORDER — TADALAFIL 20 MG/1
20 TABLET ORAL
Qty: 20 TABLET | Refills: 2 | Status: SHIPPED | OUTPATIENT
Start: 2023-11-02

## 2023-11-02 NOTE — TELEPHONE ENCOUNTER
Refill passed per [place] [employer] [facility] protocol. Requested Prescriptions   Pending Prescriptions Disp Refills    Tadalafil 20 MG Oral Tab 20 tablet 2     Sig: Take 1 tablet (20 mg total) by mouth daily as needed for Erectile Dysfunction.        Genitourinary Medications Passed - 11/1/2023  5:53 AM        Passed - Patient does not have pulmonary hypertension on problem list        Passed - In person appointment or virtual visit in the past 12 mos or appointment in next 3 mos     Recent Outpatient Visits              1 week ago Viral syndrome    [facility], Virtual Visit Lilia Mathias [place]    E-Visit    10 months ago High cholesterol    [facility], [address], Kera Maria MD    Office Visit    1 year ago Need for vaccination    [facility], [address], [place]    Nurse Only    1 year ago Need for shingles vaccine    [facility], [address], [place]    Nurse Only    1 year ago [employer]    Kera Naqvi MD    Office Visit                           Recent Outpatient Visits              1 week ago Viral syndrome    [facility], Virtual Visit Jered Loya    E-Visit    10 months ago High cholesterol    Kera Naqvi MD    Office Visit    1 year ago Need for vaccination    [facility], [address], [place]    Nurse Only    1 year ago Need for shingles vaccine    [facility], [address], [place]    Nurse Only    1 year ago [employer], [address], Kera Maria MD    Office Visit

## 2023-12-14 ENCOUNTER — NURSE TRIAGE (OUTPATIENT)
Dept: INTERNAL MEDICINE CLINIC | Facility: CLINIC | Age: 63
End: 2023-12-14

## 2023-12-14 NOTE — TELEPHONE ENCOUNTER
Spouse called for patient. 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 They told him he had elevated BP. Spouse does not know the readings. He was given 3 month permission to drive. Appt given 12/20/23 with Dr Mitch Barba. Spouse informed that Patient  to call office if any symptoms. Patient to bring any documentation from his physical. Patient to monitor BP with machine at home. Avoid processed foods for now; no salt foods. Spouse verbalized understanding. Call back if any concerns.

## 2023-12-20 ENCOUNTER — OFFICE VISIT (OUTPATIENT)
Dept: INTERNAL MEDICINE CLINIC | Facility: CLINIC | Age: 63
End: 2023-12-20

## 2023-12-20 VITALS
TEMPERATURE: 98 F | DIASTOLIC BLOOD PRESSURE: 70 MMHG | HEIGHT: 68 IN | WEIGHT: 153 LBS | HEART RATE: 73 BPM | BODY MASS INDEX: 23.19 KG/M2 | SYSTOLIC BLOOD PRESSURE: 145 MMHG

## 2023-12-20 DIAGNOSIS — I10 PRIMARY HYPERTENSION: Primary | ICD-10-CM

## 2023-12-20 PROCEDURE — 99214 OFFICE O/P EST MOD 30 MIN: CPT | Performed by: INTERNAL MEDICINE

## 2023-12-20 PROCEDURE — 3078F DIAST BP <80 MM HG: CPT | Performed by: INTERNAL MEDICINE

## 2023-12-20 PROCEDURE — 3077F SYST BP >= 140 MM HG: CPT | Performed by: INTERNAL MEDICINE

## 2023-12-20 PROCEDURE — 3008F BODY MASS INDEX DOCD: CPT | Performed by: INTERNAL MEDICINE

## 2023-12-20 RX ORDER — ALBUTEROL SULFATE 90 UG/1
2 AEROSOL, METERED RESPIRATORY (INHALATION) EVERY 6 HOURS PRN
Qty: 1 EACH | Refills: 1 | Status: SHIPPED | OUTPATIENT
Start: 2023-12-20

## 2023-12-20 RX ORDER — CYCLOBENZAPRINE HCL 10 MG
10 TABLET ORAL NIGHTLY
Qty: 90 TABLET | Refills: 0 | Status: SHIPPED | OUTPATIENT
Start: 2023-12-20

## 2023-12-20 RX ORDER — BUPROPION HYDROCHLORIDE 150 MG/1
150 TABLET, EXTENDED RELEASE ORAL 2 TIMES DAILY
Qty: 180 TABLET | Refills: 1 | Status: SHIPPED | OUTPATIENT
Start: 2023-12-20

## 2023-12-20 RX ORDER — AMLODIPINE BESYLATE 10 MG/1
10 TABLET ORAL DAILY
Qty: 90 TABLET | Refills: 0 | Status: SHIPPED | OUTPATIENT
Start: 2023-12-20

## 2024-01-10 ENCOUNTER — OFFICE VISIT (OUTPATIENT)
Dept: INTERNAL MEDICINE CLINIC | Facility: CLINIC | Age: 64
End: 2024-01-10

## 2024-01-10 VITALS
DIASTOLIC BLOOD PRESSURE: 70 MMHG | OXYGEN SATURATION: 94 % | BODY MASS INDEX: 23.64 KG/M2 | HEIGHT: 68 IN | HEART RATE: 83 BPM | SYSTOLIC BLOOD PRESSURE: 132 MMHG | WEIGHT: 156 LBS

## 2024-01-10 DIAGNOSIS — I10 PRIMARY HYPERTENSION: Primary | ICD-10-CM

## 2024-01-10 PROCEDURE — 3075F SYST BP GE 130 - 139MM HG: CPT | Performed by: INTERNAL MEDICINE

## 2024-01-10 PROCEDURE — 99213 OFFICE O/P EST LOW 20 MIN: CPT | Performed by: INTERNAL MEDICINE

## 2024-01-10 PROCEDURE — 3078F DIAST BP <80 MM HG: CPT | Performed by: INTERNAL MEDICINE

## 2024-01-10 PROCEDURE — 3008F BODY MASS INDEX DOCD: CPT | Performed by: INTERNAL MEDICINE

## 2024-01-10 RX ORDER — HYDROCHLOROTHIAZIDE 12.5 MG/1
12.5 CAPSULE, GELATIN COATED ORAL DAILY
Qty: 90 CAPSULE | Refills: 0 | Status: SHIPPED | OUTPATIENT
Start: 2024-01-10

## 2024-01-10 RX ORDER — HYDROCHLOROTHIAZIDE 25 MG/1
25 TABLET ORAL DAILY
Qty: 90 TABLET | Refills: 0 | Status: SHIPPED | OUTPATIENT
Start: 2024-01-10 | End: 2024-01-10

## 2024-01-11 NOTE — PROGRESS NOTES
Subjective:     Patient ID: Nick Velazquez is a 63 year old male.    Hypertension        History/Other: Came in today for follow-up on his blood pressure.  He is checking his blood pressure at home still fluctuates is better than before but there are still days when the blood pressure is above 140.  He takes amlodipine 10 mg daily.  Review of Systems   Constitutional: Negative.    HENT: Negative.     Respiratory: Negative.     Cardiovascular: Negative.    Gastrointestinal: Negative.    Genitourinary: Negative.    Musculoskeletal: Negative.    Neurological: Negative.    Hematological: Negative.    Psychiatric/Behavioral: Negative.       Current Outpatient Medications   Medication Sig Dispense Refill    hydroCHLOROthiazide 12.5 MG Oral Cap Take 1 capsule (12.5 mg total) by mouth daily. 90 capsule 0    albuterol (PROAIR HFA) 108 (90 Base) MCG/ACT Inhalation Aero Soln Inhale 2 puffs into the lungs every 6 (six) hours as needed for Wheezing. 1 each 1    amLODIPine 10 MG Oral Tab Take 1 tablet (10 mg total) by mouth daily. 90 tablet 0    cyclobenzaprine 10 MG Oral Tab Take 1 tablet (10 mg total) by mouth nightly. 90 tablet 0    buPROPion  MG Oral Tablet 12 Hr Take 1 tablet (150 mg total) by mouth 2 (two) times daily. 180 tablet 1    Tadalafil 20 MG Oral Tab Take 1 tablet (20 mg total) by mouth daily as needed for Erectile Dysfunction. 20 tablet 2    atorvastatin 40 MG Oral Tab TAKE 1 TABLET BY MOUTH EVERY NIGHT 90 tablet 3    naproxen 500 MG Oral Tab Take 1 tablet (500 mg total) by mouth 2 (two) times daily with meals. 180 tablet 1    gabapentin 600 MG Oral Tab TAKE 1 TABLET BY MOUTH EVERY MORNING THEN TAKE 2 TABLET BY MOUTH EVERY NIGHT AT BEDTIME 270 tablet 3    cyanocobalamine 1000 MCG Oral Tab Take 1 tablet (1,000 mcg total) by mouth daily. 90 tablet 1    ASPIRIN 81 OR Take by mouth.       Allergies:  Allergies   Allergen Reactions    Morphine NAUSEA AND VOMITING       Past Medical History:   Diagnosis  Date    High cholesterol     Tobacco abuse       Past Surgical History:   Procedure Laterality Date    COLONOSCOPY  07/2019    COLONOSCOPY N/A 7/25/2019    Procedure: COLONOSCOPY;  Surgeon: Deep Allred MD;  Location: [facility] ENDOSCOPY    EXCIS INFRATENT BRAIN TUMOR      LAMINECTOMY  2001      Family History   Problem Relation Age of Onset    Heart Disease Father     Heart Disease Mother     Lung Disorder Mother         emphysema      Social History:   Social History     Socioeconomic History    Marital status:    Tobacco Use    Smoking status: Every Day     Packs/day: 1.00     Years: 40.00     Additional pack years: 0.00     Total pack years: 40.00     Types: Cigarettes    Smokeless tobacco: Former    Tobacco comments:     tried quitting 4/1/18; back to 1PPD   Substance and Sexual Activity    Alcohol use: No     Alcohol/week: 0.0 standard drinks of alcohol    Drug use: No   Other Topics Concern    Caffeine Concern Yes     Comment: Coffee        Objective:   Physical Exam  Vitals and nursing note reviewed.   Constitutional:       Appearance: Normal appearance.   HENT:      Head: Normocephalic and atraumatic.   Cardiovascular:      Rate and Rhythm: Normal rate and regular rhythm.      Pulses: Normal pulses.      Heart sounds: Normal heart sounds.   Pulmonary:      Effort: Pulmonary effort is normal.      Breath sounds: Normal breath sounds.   Abdominal:      Palpations: Abdomen is soft.   Musculoskeletal:         General: Normal range of motion.      Cervical back: Normal range of motion and neck supple.   Skin:     General: Skin is warm.   Neurological:      Mental Status: He is alert. Mental status is at baseline.         Assessment & Plan:   No diagnosis found.  Essential hypertension blood pressure noted I will add hydrochlorothiazide 12.5mg, monitor blood pressure at home and follow-up  No orders of the defined types were placed in this encounter.      Meds This Visit:  Requested Prescriptions     Signed  Prescriptions Disp Refills    hydroCHLOROthiazide 12.5 MG Oral Cap 90 capsule 0     Sig: Take 1 capsule (12.5 mg total) by mouth daily.       Imaging & Referrals:  None

## 2024-01-15 RX ORDER — TADALAFIL 20 MG/1
20 TABLET ORAL
Qty: 20 TABLET | Refills: 2 | OUTPATIENT
Start: 2024-01-15

## 2024-01-31 ENCOUNTER — OFFICE VISIT (OUTPATIENT)
Dept: INTERNAL MEDICINE CLINIC | Facility: CLINIC | Age: 64
End: 2024-01-31

## 2024-01-31 VITALS
SYSTOLIC BLOOD PRESSURE: 133 MMHG | TEMPERATURE: 98 F | HEIGHT: 68 IN | WEIGHT: 154 LBS | BODY MASS INDEX: 23.34 KG/M2 | HEART RATE: 85 BPM | DIASTOLIC BLOOD PRESSURE: 69 MMHG

## 2024-01-31 DIAGNOSIS — I10 PRIMARY HYPERTENSION: Primary | ICD-10-CM

## 2024-01-31 PROCEDURE — 99213 OFFICE O/P EST LOW 20 MIN: CPT | Performed by: INTERNAL MEDICINE

## 2024-01-31 PROCEDURE — 3078F DIAST BP <80 MM HG: CPT | Performed by: INTERNAL MEDICINE

## 2024-01-31 PROCEDURE — 3075F SYST BP GE 130 - 139MM HG: CPT | Performed by: INTERNAL MEDICINE

## 2024-01-31 PROCEDURE — 3008F BODY MASS INDEX DOCD: CPT | Performed by: INTERNAL MEDICINE

## 2024-02-01 NOTE — PROGRESS NOTES
Subjective:     Patient ID: Nick Velazquez is a 63 year old male.    Hypertension        History/Other: He came in today for follow-up on his blood pressure.  He is monitoring his blood pressure at home and now is well-controlled.  He is taking amlodipine 10 mg and HCTZ 12.5 mg daily.  He denies any complaints.  Review of Systems   Constitutional: Negative.    HENT: Negative.     Eyes: Negative.    Respiratory: Negative.     Cardiovascular: Negative.    Gastrointestinal: Negative.    Genitourinary: Negative.    Musculoskeletal: Negative.    Skin: Negative.    Neurological: Negative.    Hematological: Negative.    Psychiatric/Behavioral: Negative.       Current Outpatient Medications   Medication Sig Dispense Refill    hydroCHLOROthiazide 12.5 MG Oral Cap Take 1 capsule (12.5 mg total) by mouth daily. 90 capsule 0    albuterol (PROAIR HFA) 108 (90 Base) MCG/ACT Inhalation Aero Soln Inhale 2 puffs into the lungs every 6 (six) hours as needed for Wheezing. 1 each 1    amLODIPine 10 MG Oral Tab Take 1 tablet (10 mg total) by mouth daily. 90 tablet 0    cyclobenzaprine 10 MG Oral Tab Take 1 tablet (10 mg total) by mouth nightly. 90 tablet 0    buPROPion  MG Oral Tablet 12 Hr Take 1 tablet (150 mg total) by mouth 2 (two) times daily. 180 tablet 1    Tadalafil 20 MG Oral Tab Take 1 tablet (20 mg total) by mouth daily as needed for Erectile Dysfunction. 20 tablet 2    atorvastatin 40 MG Oral Tab TAKE 1 TABLET BY MOUTH EVERY NIGHT 90 tablet 3    naproxen 500 MG Oral Tab Take 1 tablet (500 mg total) by mouth 2 (two) times daily with meals. 180 tablet 1    gabapentin 600 MG Oral Tab TAKE 1 TABLET BY MOUTH EVERY MORNING THEN TAKE 2 TABLET BY MOUTH EVERY NIGHT AT BEDTIME 270 tablet 3    cyanocobalamine 1000 MCG Oral Tab Take 1 tablet (1,000 mcg total) by mouth daily. 90 tablet 1    ASPIRIN 81 OR Take by mouth.       Allergies:  Allergies   Allergen Reactions    Morphine NAUSEA AND VOMITING       Past Medical  History:   Diagnosis Date    High cholesterol     Tobacco abuse       Past Surgical History:   Procedure Laterality Date    COLONOSCOPY  07/2019    COLONOSCOPY N/A 7/25/2019    Procedure: COLONOSCOPY;  Surgeon: Deep Allred MD;  Location: [facility] ENDOSCOPY    EXCIS INFRATENT BRAIN TUMOR      LAMINECTOMY  2001      Family History   Problem Relation Age of Onset    Heart Disease Father     Heart Disease Mother     Lung Disorder Mother         emphysema      Social History:   Social History     Socioeconomic History    Marital status:    Tobacco Use    Smoking status: Every Day     Packs/day: 1.00     Years: 40.00     Additional pack years: 0.00     Total pack years: 40.00     Types: Cigarettes    Smokeless tobacco: Former    Tobacco comments:     tried quitting 4/1/18; back to 1PPD   Substance and Sexual Activity    Alcohol use: No     Alcohol/week: 0.0 standard drinks of alcohol    Drug use: No   Other Topics Concern    Caffeine Concern Yes     Comment: Coffee        Objective:   Physical Exam  Vitals and nursing note reviewed.   Constitutional:       Appearance: Normal appearance.   HENT:      Head: Normocephalic and atraumatic.   Cardiovascular:      Rate and Rhythm: Normal rate and regular rhythm.      Pulses: Normal pulses.      Heart sounds: Normal heart sounds.   Pulmonary:      Effort: Pulmonary effort is normal.      Breath sounds: Normal breath sounds.   Musculoskeletal:         General: Normal range of motion.      Cervical back: Normal range of motion and neck supple.   Skin:     General: Skin is warm.   Neurological:      Mental Status: He is alert. Mental status is at baseline.         Assessment & Plan:   No diagnosis found.  Hypertension well-controlled continue with current medication continue to monitor blood pressure at home  No orders of the defined types were placed in this encounter.      Meds This Visit:  Requested Prescriptions      No prescriptions requested or ordered in this  encounter       Imaging & Referrals:  None

## 2024-03-06 RX ORDER — CYCLOBENZAPRINE HCL 10 MG
10 TABLET ORAL NIGHTLY
Qty: 90 TABLET | Refills: 0 | Status: SHIPPED | OUTPATIENT
Start: 2024-03-06

## 2024-03-06 NOTE — TELEPHONE ENCOUNTER
Please review; protocol failed/ has no protocol    Requested Prescriptions   Pending Prescriptions Disp Refills    CYCLOBENZAPRINE 10 MG Oral Tab [Pharmacy Med Name: CYCLOBENZAPRINE 10MG TABLETS] 90 tablet 0     Sig: TAKE 1 TABLET(10 MG) BY MOUTH EVERY NIGHT       There is no refill protocol information for this order        Recent Outpatient Visits              [facility] ago [facility]    1 month ago Primary hypertension    [facility] Lena Cao MD    Office Visit    2 months ago Primary hypertension    [facility] Lena Cao MD    Office Visit    4 months ago Viral syndrome    [facility], Virtual Visit Paige Zepeda PA    E-Visit    1 year ago High cholesterol    [facility] Kath Morris MD    Office Visit          Future Appointments         Provider Department Appt Notes    In 3 months Kath Morris MD [facility] px-12/22

## 2024-03-07 RX ORDER — CYCLOBENZAPRINE HCL 10 MG
10 TABLET ORAL NIGHTLY
Qty: 90 TABLET | Refills: 0 | OUTPATIENT
Start: 2024-03-07

## 2024-03-07 RX ORDER — TADALAFIL 20 MG/1
20 TABLET ORAL
Qty: 20 TABLET | Refills: 2 | Status: SHIPPED | OUTPATIENT
Start: 2024-03-07

## 2024-03-07 RX ORDER — AMLODIPINE BESYLATE 10 MG/1
10 TABLET ORAL DAILY
Qty: 90 TABLET | Refills: 0 | Status: SHIPPED | OUTPATIENT
Start: 2024-03-07

## 2024-03-07 NOTE — TELEPHONE ENCOUNTER
Refill passed per [facility] protocol.    Requested Prescriptions   Pending Prescriptions Disp Refills    Tadalafil 20 MG Oral Tab 20 tablet 2     Sig: Take 1 tablet (20 mg total) by mouth daily as needed for Erectile Dysfunction.       Genitourinary Medications Passed - 3/6/2024  4:07 PM        Passed - Patient does not have pulmonary hypertension on problem list        Passed - In person appointment or virtual visit in the past 12 mos or appointment in next 3 mos     Recent Outpatient Visits              1 month ago [facility]    1 month ago [facility]    2 months ago [facility]    [facility]    4 months ago Viral syndrome    [facility], Virtual Visit Paige Zepeda PA    E-Visit    1 year ago High cholesterol    [facility] [place]    [place]          Future Appointments         Provider Department Appt Notes    In 3 months Kath Morris MD [facility] px-12/22                     Recent Outpatient Visits              1 month ago [facility] hypertension    [facility]    1 month ago [facility] hypertension    [facility]    [facility]    2 months ago [facility] hypertension    [facility]    [facility] Visit    4 months ago Viral syndrome    [facility], Virtual Visit Paige Zepeda PA    E-Visit    1 year ago [facility] cholesterol    [facility]    Office Visit            Future Appointments         Provider Department Appt Notes    In 3 months Kath Morris MD [facility] px-12/22

## 2024-03-07 NOTE — TELEPHONE ENCOUNTER
Routed to Dr Lena Fuchs for advise on amlodipine, thanks.    Duplicate request - cyclobenzaprine 10 MG already refilled 3-6-24.    Protocol Failed/ No Protocol    Requested Prescriptions   Pending Prescriptions Disp Refills    amLODIPine 10 MG Oral Tab 90 tablet 0     Sig: Take 1 tablet (10 mg total) by mouth daily.       Hypertension Medications Protocol Failed - 3/6/2024  4:06 PM        Failed - CMP or BMP in past 12 months        Failed - EGFRCR or GFRNAA > 50     GFR Evaluation            Passed - Last BP reading less than 140/90     BP Readings from Last 1 Encounters:   01/31/24 133/69               Passed - In person appointment or virtual visit in the past 12 mos or appointment in next 3 mos     Recent Outpatient Visits              [facility] ago [facility]    [facility]    1 month ago [facility]    [facility]    2 months ago [facility] hypertension    [facility]    [facility] Visit    4 months ago Viral syndrome    [facility], Virtual Visit Paige Zepeda PA    E-Visit    1 year ago [facility] cholesterol    [facility]          Future Appointments         Provider Department Appt Notes    In 3 months [facility]                 cyclobenzaprine 10 MG Oral Tab 90 tablet 0     Sig: Take 1 tablet (10 mg total) by mouth nightly.       There is no refill protocol information for this order          Future Appointments         Provider Department Appt Notes    In 3 months Kath Morris MD [facility] px-12/22          Recent Outpatient Visits              1 month ago Primary  hypertension    [facility], [facility], Lena Cao MD    Office Visit    1 month ago Primary hypertension    [facility], Lena Cao MD    Office Visit    2 months ago Primary hypertension    [facility], Lena Cao MD    Office Visit    4 months ago Viral syndrome    [facility], Virtual Visit Paige Zepeda PA    E-Visit    1 year ago High cholesterol    [facility], Kath Cruz MD    Office Visit

## 2024-03-12 RX ORDER — CYCLOBENZAPRINE HCL 10 MG
10 TABLET ORAL NIGHTLY
Qty: 90 TABLET | Refills: 0 | OUTPATIENT
Start: 2024-03-12

## 2024-03-13 RX ORDER — AMLODIPINE BESYLATE 10 MG/1
10 TABLET ORAL DAILY
Qty: 90 TABLET | Refills: 0 | OUTPATIENT
Start: 2024-03-13

## 2024-03-21 ENCOUNTER — TELEPHONE (OUTPATIENT)
Dept: INTERNAL MEDICINE CLINIC | Facility: CLINIC | Age: 64
End: 2024-03-21

## 2024-03-21 NOTE — TELEPHONE ENCOUNTER
Patient spouse came  [facility] in to the office asking to see if you can fill out a form that you have been treating him for  blood pressure and new medication need for his work  as soon possible (send to [facility] )

## 2024-03-22 NOTE — TELEPHONE ENCOUNTER
Patient is calling back to advise he went to [facility] and advised they did not receive the fax.      Patient is asking to have it refaxed to  FAX #[phone]    Please advise.

## 2024-03-29 RX ORDER — NAPROXEN 500 MG/1
500 TABLET ORAL 2 TIMES DAILY WITH MEALS
Qty: 180 TABLET | Refills: 1 | Status: SHIPPED | OUTPATIENT
Start: 2024-03-29

## 2024-03-29 NOTE — TELEPHONE ENCOUNTER
Last ref 4-30-23 # 180 # 1  pls advise, thanks in advance.         Refill Passed Per Protocol    Requested Prescriptions   Pending Prescriptions Disp Refills    NAPROXEN 500 MG Oral Tab [Pharmacy Med Name: NAPROXEN 500MG TABLETS] 180 tablet 1     Sig: TAKE 1 TABLET(500 MG) BY MOUTH TWICE DAILY WITH MEALS       Non-Narcotic Pain Medication Protocol Passed - 3/27/2024  3:41 AM        Passed - In person appointment or virtual visit in the past 6 mos or appointment in next 3 mos     Recent Outpatient Visits              1 month ago [facility]    [facility]    2 months ago [facility]    [facility]    [facility]    3 months ago [facility] hypertension    [facility]    Office Visit    5 months ago Viral syndrome    [facility], Virtual Visit Paige Zepeda PA    E-Visit    1 year ago [facility]    [facility]          Future Appointments         Provider Department Appt Notes    In 3 months Kath Morris MD [facility] px-12/22                    Future Appointments         Provider Department Appt Notes    In 3 months Kath Morris MD [facility] px-12/22          Recent Outpatient Visits              1 month ago [facility] hypertension    [facility]    [facility]    2 months ago [facility] hypertension    [facility]    Office Visit    3 months ago [facility] hypertension    [facility]    Office Visit    5 months ago Viral syndrome     [facility], Virtual Visit Paige Zepeda PA    E-Visit    1 year ago High cholesterol    [facility]    Office Visit

## 2024-04-08 RX ORDER — GABAPENTIN 600 MG/1
TABLET ORAL
Qty: 270 TABLET | Refills: 3 | Status: SHIPPED | OUTPATIENT
Start: 2024-04-08

## 2024-04-08 NOTE — TELEPHONE ENCOUNTER
Refill passed per [facility] protocol.      Requested Prescriptions   Pending Prescriptions Disp Refills    GABAPENTIN 600 MG Oral Tab [Pharmacy Med Name: GABAPENTIN 600MG TABLETS] 270 tablet 3     Sig: TAKE 1 TABLET BY MOUTH EVERY MORNING THEN TAKE 2 TABLETS BY MOUTH EVERY NIGHT AT BEDTIME       Neurology Medications Passed - 4/7/2024  3:42 AM        Passed - In person appointment or virtual visit in the past 6 mos or appointment in next 3 mos     Recent Outpatient Visits              2 months ago [facility]    2 months ago [facility]    [facility]    3 months ago [facility] hypertension    [facility]    Office Visit    5 months ago Viral syndrome    [facility], Virtual Visit Paige Zepeda PA    E-Visit    1 year ago High cholesterol    [facility]    Office Visit          Future Appointments         Provider Department Appt Notes    In 2 months [facility]                     Future Appointments         Provider Department Appt Notes    In 2 months [facility]            Recent Outpatient Visits              2 months ago Primary hypertension    [facility], Lena Cao MD    Office Visit    2 months ago [facility] hypertension    [facility]    [facility] Visit    3 months ago [facility] hypertension    [facility]    Office Visit    5 months ago Viral syndrome    [facility], Virtual Visit Paige Zepeda PA    E-Visit    1 year ago High cholesterol    [facility] [place]    Office Visit

## 2024-04-22 RX ORDER — TADALAFIL 20 MG/1
20 TABLET ORAL
Qty: 20 TABLET | Refills: 2 | OUTPATIENT
Start: 2024-04-22

## 2024-06-17 RX ORDER — AMLODIPINE BESYLATE 10 MG/1
10 TABLET ORAL DAILY
Qty: 90 TABLET | Refills: 0 | Status: SHIPPED | OUTPATIENT
Start: 2024-06-17

## 2024-06-17 RX ORDER — TADALAFIL 20 MG/1
20 TABLET ORAL
Qty: 20 TABLET | Refills: 2 | Status: SHIPPED | OUTPATIENT
Start: 2024-06-17

## 2024-06-17 NOTE — TELEPHONE ENCOUNTER
Please review; protocol failed/ has no protocol    Requested Prescriptions   Pending Prescriptions Disp Refills    ALBUTEROL 108 (90 Base) MCG/ACT Inhalation Aero Soln [Pharmacy Med Name: ALBUTEROL HFA INH (200 PUFFS) 8.5GM] 8.5 g 0     Sig: INHALE 2 PUFFS INTO THE LUNGS EVERY 6 HOURS AS NEEDED FOR WHEEZING       Asthma & COPD Medication Protocol Failed - 6/14/2024  3:41 AM        Failed - Asthma Action Score greater than or equal to 20        Failed - AAP/ACT given in last 12 months     No data recorded  No data recorded  No data recorded  No data recorded          Passed - Appointment in past 6 or next 3 months      Recent Outpatient Visits              4 months ago Primary hypertension    [facility] Lena Cao MD    Office Visit    5 months ago [facility]    [facility]    [facility]    6 months ago [facility] hypertension    [facility]    Office Visit    7 months ago Viral syndrome    [facility], Virtual Visit Paige Zepeda PA    E-Visit    1 year ago High cholesterol    [facility] Kath Morris MD    Office Visit          Future Appointments         Provider Department Appt Notes    In 2 weeks [facility]    In 7 months Kath Morris MD [facility]                        Recent Outpatient Visits              4 months ago [facility] hypertension    [facility]    [facility] Visit    5 months ago [facility] hypertension    [facility]    Office Visit    6 months ago Primary hypertension    [facility] Felipa Fuchs  MD Lena    Office Visit    7 months ago Viral syndrome    [facility], Virtual Visit Paige Zepeda PA    E-Visit    1 year ago High cholesterol    [facility] Kath Morris MD    Office Visit          Future Appointments         Provider Department Appt Notes    In 2 weeks Kath Morris MD [facility] px-12/22    In 7 months Kath Morris MD [facility]

## 2024-06-17 NOTE — TELEPHONE ENCOUNTER
Please review; protocol failed/ has no protocol    No active /future labs noted     Requested Prescriptions   Pending Prescriptions Disp Refills    AMLODIPINE 10 MG Oral Tab [Pharmacy Med Name: AMLODIPINE BESYLATE 10MG TABLETS] 90 tablet 0     Sig: TAKE 1 TABLET(10 MG) BY MOUTH DAILY       Hypertension Medications Protocol Failed - 6/13/2024  7:34 PM        Failed - CMP or BMP in past 12 months        Failed - EGFRCR or GFRNAA > 50     GFR Evaluation            Passed - Last BP reading less than 140/90     BP Readings from Last 1 Encounters:   01/31/24 133/69               Passed - In person appointment or virtual visit in the past 12 mos or appointment in next 3 mos     Recent Outpatient Visits              4 months ago Primary hypertension    [facility] [place]    Office Visit    5 months ago Primary hypertension    [facility] [place]    Office Visit    6 months ago Primary hypertension    [facility] [place]    Office Visit    7 months ago Viral syndrome    [facility], Virtual Visit Paige Zepeda PA    E-Visit    1 year ago High cholesterol    [facility] Kath Morris MD    Office Visit          Future Appointments         Provider Department Appt Notes    In 2 weeks [facility]    In 7 months Kath Morris MD [facility]                        Recent Outpatient Visits              4 months ago Primary hypertension    [facility] [place]    Office Visit    5 months ago Primary hypertension    [facility] [place]    Office Visit    6 months ago Primary hypertension     [facility] Lena Fuchs MD    Office Visit    7 months ago Viral syndrome    [facility], Virtual Visit Paige Zepeda PA    E-Visit    1 year ago High cholesterol    [facility] Kath Morris MD    Office Visit          Future Appointments         Provider Department Appt Notes    In 2 weeks Kath Morris MD [facility] px-12/22    In 7 months Kath Morris MD [facility]

## 2024-06-17 NOTE — TELEPHONE ENCOUNTER
Refill passed per [facility] protocol.  Requested Prescriptions   Pending Prescriptions Disp Refills    Tadalafil 20 MG Oral Tab 20 tablet 2     Sig: Take 1 tablet (20 mg total) by mouth daily as needed for Erectile Dysfunction.       Genitourinary Medications Passed - 6/13/2024  7:32 PM        Passed - Patient does not have pulmonary hypertension on problem list        Passed - In person appointment or virtual visit in the past 12 mos or appointment in next 3 mos     Recent Outpatient Visits              4 months ago Primary hypertension    [facility]    [facility]    5 months ago Primary hypertension    [facility]    Office Visit    6 months ago Primary hypertension    [facility]    Office Visit    7 months ago Viral syndrome    [facility], Virtual Visit Paige Zepeda PA    E-Visit    1 year ago High cholesterol    [facility] Kath Morris MD    Office Visit          Future Appointments         Provider Department Appt Notes    In 2 weeks Kath Morris MD [facility] px-12/22    In 7 months Kath Morris MD [facility]                        Recent Outpatient Visits              4 months ago Primary hypertension    [facility]    Office Visit    5 months ago Primary hypertension    [facility]    Office Visit    6 months ago Primary hypertension    [facility]    Office Visit    7 months ago Viral syndrome    [facility], Virtual Visit Paige Zepeda PA    E-Visit    1 year  ago High cholesterol    [facility] Kath Morris MD    Office Visit          Future Appointments         Provider Department Appt Notes    In 2 weeks Kath Morris MD [facility] px-12/22    In 7 months Kath Morris MD [facility]

## 2024-06-18 RX ORDER — ALBUTEROL SULFATE 90 UG/1
2 AEROSOL, METERED RESPIRATORY (INHALATION) EVERY 6 HOURS PRN
Qty: 8.5 G | Refills: 0 | Status: SHIPPED | OUTPATIENT
Start: 2024-06-18

## 2024-06-27 PROBLEM — D32.9 MENINGIOMA (HCC): Status: RESOLVED | Noted: 2018-03-31 | Resolved: 2024-06-27

## 2024-06-27 PROBLEM — Z86.018 HISTORY OF MENINGIOMA: Status: ACTIVE | Noted: 2018-03-31

## 2024-07-02 ENCOUNTER — OFFICE VISIT (OUTPATIENT)
Dept: INTERNAL MEDICINE CLINIC | Facility: CLINIC | Age: 64
End: 2024-07-02

## 2024-07-02 VITALS
TEMPERATURE: 98 F | BODY MASS INDEX: 23.04 KG/M2 | OXYGEN SATURATION: 100 % | HEIGHT: 68 IN | SYSTOLIC BLOOD PRESSURE: 116 MMHG | HEART RATE: 85 BPM | DIASTOLIC BLOOD PRESSURE: 65 MMHG | WEIGHT: 152 LBS

## 2024-07-02 DIAGNOSIS — Z00.00 ADULT GENERAL MEDICAL EXAM: ICD-10-CM

## 2024-07-02 DIAGNOSIS — R73.9 HYPERGLYCEMIA: ICD-10-CM

## 2024-07-02 DIAGNOSIS — Z72.0 TOBACCO ABUSE: ICD-10-CM

## 2024-07-02 DIAGNOSIS — R06.83 SNORING: ICD-10-CM

## 2024-07-02 DIAGNOSIS — Z12.5 PROSTATE CANCER SCREENING: ICD-10-CM

## 2024-07-02 DIAGNOSIS — E78.00 HIGH CHOLESTEROL: Primary | ICD-10-CM

## 2024-07-02 DIAGNOSIS — F17.210 CIGARETTE SMOKER: ICD-10-CM

## 2024-07-02 DIAGNOSIS — Z86.018 HISTORY OF MENINGIOMA: ICD-10-CM

## 2024-07-02 DIAGNOSIS — G89.29 WRIST PAIN, CHRONIC, LEFT: ICD-10-CM

## 2024-07-02 DIAGNOSIS — I10 ESSENTIAL HYPERTENSION: ICD-10-CM

## 2024-07-02 DIAGNOSIS — Z71.85 VACCINE COUNSELING: ICD-10-CM

## 2024-07-02 DIAGNOSIS — M25.532 WRIST PAIN, CHRONIC, LEFT: ICD-10-CM

## 2024-07-02 LAB
APPEARANCE: CLEAR
BILIRUBIN: NEGATIVE
GLUCOSE (URINE DIPSTICK): NEGATIVE MG/DL
KETONES (URINE DIPSTICK): NEGATIVE MG/DL
LEUKOCYTES: NEGATIVE
MULTISTIX LOT#: NORMAL NUMERIC
NITRITE, URINE: NEGATIVE
OCCULT BLOOD: NEGATIVE
PH, URINE: 5.5 (ref 4.5–8)
PROTEIN (URINE DIPSTICK): NEGATIVE MG/DL
SPECIFIC GRAVITY: 1.02 (ref 1–1.03)
URINE-COLOR: YELLOW
UROBILINOGEN,SEMI-QN: 1 MG/DL (ref 0–1.9)

## 2024-07-02 PROCEDURE — 3008F BODY MASS INDEX DOCD: CPT | Performed by: INTERNAL MEDICINE

## 2024-07-02 PROCEDURE — 99396 PREV VISIT EST AGE 40-64: CPT | Performed by: INTERNAL MEDICINE

## 2024-07-02 PROCEDURE — 3078F DIAST BP <80 MM HG: CPT | Performed by: INTERNAL MEDICINE

## 2024-07-02 PROCEDURE — 3074F SYST BP LT 130 MM HG: CPT | Performed by: INTERNAL MEDICINE

## 2024-07-02 PROCEDURE — 99406 BEHAV CHNG SMOKING 3-10 MIN: CPT | Performed by: INTERNAL MEDICINE

## 2024-07-02 PROCEDURE — 81003 URINALYSIS AUTO W/O SCOPE: CPT | Performed by: INTERNAL MEDICINE

## 2024-07-02 PROCEDURE — 99214 OFFICE O/P EST MOD 30 MIN: CPT | Performed by: INTERNAL MEDICINE

## 2024-07-02 RX ORDER — GABAPENTIN 600 MG/1
TABLET ORAL
Qty: 360 TABLET | Refills: 3 | Status: SHIPPED | OUTPATIENT
Start: 2024-07-02

## 2024-07-02 NOTE — PROGRESS NOTES
HPI:    Patient ID: Nick Velazquez is a 64 year old male.  Nick Velazquez is a 64 year old male who presents for a complete physical exam.   HPI:     Wt Readings from Last 3 Encounters:   07/02/24 152 lb (68.9 kg)   01/31/24 154 lb (69.9 kg)   01/10/24 156 lb (70.8 kg)     Body mass index is 23.11 kg/m².     Cholesterol, Total (mg/dL)   Date Value   01/21/2023 118   08/28/2021 135   11/16/2019 140     HDL Cholesterol (mg/dL)   Date Value   01/21/2023 37 (L)   08/28/2021 33 (L)   11/16/2019 36 (L)     LDL Cholesterol (mg/dL)   Date Value   01/21/2023 63   08/28/2021 81   11/16/2019 83     AST (U/L)   Date Value   01/21/2023 15   08/28/2021 15   11/16/2019 14 (L)     ALT (U/L)   Date Value   01/21/2023 22   08/28/2021 26   11/16/2019 20      Current Outpatient Medications   Medication Sig Dispense Refill    gabapentin 600 MG Oral Tab TAKE 1 TABLET BY MOUTH EVERY MORNING THEN TAKE 3 TABLETS BY MOUTH EVERY NIGHT AT BEDTIME 360 tablet 3    albuterol 108 (90 Base) MCG/ACT Inhalation Aero Soln Inhale 2 puffs into the lungs every 6 (six) hours as needed for Wheezing. 8.5 g 0    Tadalafil 20 MG Oral Tab Take 1 tablet (20 mg total) by mouth daily as needed for Erectile Dysfunction. 20 tablet 2    amLODIPine 10 MG Oral Tab Take 1 tablet (10 mg total) by mouth daily. 90 tablet 0    naproxen 500 MG Oral Tab Take 1 tablet (500 mg total) by mouth 2 (two) times daily with meals. 180 tablet 1    cyclobenzaprine 10 MG Oral Tab Take 1 tablet (10 mg total) by mouth nightly. 90 tablet 0    hydroCHLOROthiazide 12.5 MG Oral Cap Take 1 capsule (12.5 mg total) by mouth daily. 90 capsule 0    buPROPion  MG Oral Tablet 12 Hr Take 1 tablet (150 mg total) by mouth 2 (two) times daily. 180 tablet 1    atorvastatin 40 MG Oral Tab TAKE 1 TABLET BY MOUTH EVERY NIGHT 90 tablet 3    cyanocobalamine 1000 MCG Oral Tab Take 1 tablet (1,000 mcg total) by mouth daily. 90 tablet 1    ASPIRIN 81 OR Take by mouth.        Past Medical  History:    High cholesterol    Tobacco abuse      Past Surgical History:   Procedure Laterality Date    Colonoscopy  07/2019    Colonoscopy N/A 7/25/2019    Procedure: COLONOSCOPY;  Surgeon: Deep Allred MD;  Location: [facility] ENDOSCOPY    Excis infratent brain tumor      Laminectomy  2001      Family History   Problem Relation Age of Onset    Heart Disease Father     Heart Disease Mother     Lung Disorder Mother         emphysema      Social History:  Social History     Socioeconomic History    Marital status:    Tobacco Use    Smoking status: Every Day     Current packs/day: 1.00     Average packs/day: 1 pack/day for 40.0 years (40.0 ttl pk-yrs)     Types: Cigarettes    Smokeless tobacco: Former    Tobacco comments:     tried quitting 4/1/18; back to 1PPD   Substance and Sexual Activity    Alcohol use: No     Alcohol/week: 0.0 standard drinks of alcohol    Drug use: No   Other Topics Concern    Caffeine Concern Yes     Comment: Coffee     Social Determinants of Health      [facility]    Social Connections    [facility]    Housing Stability           Wt Readings from Last 3 Encounters:   07/02/24 152 lb (68.9 kg)   01/31/24 154 lb (69.9 kg)   01/10/24 156 lb (70.8 kg)     BP Readings from Last 3 Encounters:   07/02/24 116/65   01/31/24 133/69   01/10/24 132/70       Labs:   Lab Results   Component Value Date/Time     (H) 01/21/2023 08:36 AM     01/21/2023 08:36 AM    K 4.3 01/21/2023 08:36 AM     01/21/2023 08:36 AM    CO2 27.0 01/21/2023 08:36 AM    CREATSERUM 0.86 01/21/2023 08:36 AM    CA 8.7 01/21/2023 08:36 AM    AST 15 01/21/2023 08:36 AM    ALT 22 01/21/2023 08:36 AM    TSH 0.804 01/21/2023 08:36 AM    T4F 0.87 04/01/2018 09:45 AM        Lab Results   Component Value Date/Time    CHOLEST 118 01/21/2023 08:36 AM    HDL 37 (L) 01/21/2023 08:36 AM     TRIG 95 01/21/2023 08:36 AM    LDL 63 01/21/2023 08:36 AM    NONHDLC 81 01/21/2023 08:36 AM          EXAM:   /65 (BP Location: Right arm, Patient Position: Sitting, Cuff Size: adult)   Pulse 85   Temp 98 °F (36.7 °C) (Oral)   Ht 5' 8\" (1.727 m)   Wt 152 lb (68.9 kg)   SpO2 100%   BMI 23.11 kg/m²   Body mass index is 23.11 kg/m².       ASSESSMENT AND PLAN:   Nick Velazquez is a 64 year old male who presents for a complete physical exam. Pt's weight is Body mass index is 23.11 kg/m²., recommended low fat diet and aerobic exercise 30 minutes three times weekly.     Discussed regular exercise, low fat diet, The patient indicates understanding of these issues and agrees to the plan.  The patient is asked to return for annual physical in 1 year.     Hypertension  Patient is here for follow up of hypertension. BP at home: not check.   Only if have symptoms, then checks now. Has been compliant with medications.  Exercise level: trying to do more (back to work after being sick with covid. + home testing > 3 weeks ago Breathing closer to baseline inhaler daily once) and has been following low salt diet.  Weight has been stable.  Fridays is pizza day. Other times, wife cooks.   Wt Readings from Last 3 Encounters:   07/02/24 152 lb (68.9 kg)   01/31/24 154 lb (69.9 kg)   01/10/24 156 lb (70.8 kg)     BP Readings from Last 3 Encounters:   07/02/24 116/65   01/31/24 133/69   01/10/24 132/70     Labs:   Lab Results   Component Value Date/Time     (H) 01/21/2023 08:36 AM     01/21/2023 08:36 AM    K 4.3 01/21/2023 08:36 AM     01/21/2023 08:36 AM    CO2 27.0 01/21/2023 08:36 AM    CREATSERUM 0.86 01/21/2023 08:36 AM    CA 8.7 01/21/2023 08:36 AM    AST 15 01/21/2023 08:36 AM    ALT 22 01/21/2023 08:36 AM    TSH 0.804 01/21/2023 08:36 AM    T4F 0.87 04/01/2018 09:45 AM        Lab Results   Component Value Date/Time    CHOLEST 118 01/21/2023 08:36 AM    HDL 37 (L) 01/21/2023 08:36 AM    TRIG 95  01/21/2023 08:36 AM    LDL 63 01/21/2023 08:36 AM    NONHDLC 81 01/21/2023 08:36 AM            Wt Readings from Last 3 Encounters:   07/02/24 152 lb (68.9 kg)   01/31/24 154 lb (69.9 kg)   01/10/24 156 lb (70.8 kg)     BP Readings from Last 3 Encounters:   07/02/24 116/65   01/31/24 133/69   01/10/24 132/70     Labs:   Lab Results   Component Value Date/Time     (H) 01/21/2023 08:36 AM     01/21/2023 08:36 AM    K 4.3 01/21/2023 08:36 AM     01/21/2023 08:36 AM    CO2 27.0 01/21/2023 08:36 AM    CREATSERUM 0.86 01/21/2023 08:36 AM    CA 8.7 01/21/2023 08:36 AM    AST 15 01/21/2023 08:36 AM    ALT 22 01/21/2023 08:36 AM    TSH 0.804 01/21/2023 08:36 AM    T4F 0.87 04/01/2018 09:45 AM        Lab Results   Component Value Date/Time    CHOLEST 118 01/21/2023 08:36 AM    HDL 37 (L) 01/21/2023 08:36 AM    TRIG 95 01/21/2023 08:36 AM    LDL 63 01/21/2023 08:36 AM    NONHDLC 81 01/21/2023 08:36 AM          Still with neck pain.  Same as prior.  Left arm still feels weak.  Hard to open his jaw with left hand.  Right-handed.          Review of Systems   Constitutional: Negative.  Negative for activity change, appetite change, chills, diaphoresis, fatigue, fever and unexpected weight change.   HENT: Negative.  Negative for congestion, dental problem, drooling, ear discharge, ear pain, facial swelling, hearing loss, mouth sores, nosebleeds, postnasal drip, rhinorrhea, sinus pressure, sinus pain, sneezing, sore throat, tinnitus, trouble swallowing and voice change.    Eyes: Negative.  Negative for photophobia, pain, discharge, redness, itching and visual disturbance.   Respiratory: Negative.  Negative for apnea, cough, choking, chest tightness, shortness of breath, wheezing and stridor.    Cardiovascular: Negative.  Negative for chest pain, palpitations and leg swelling.   Gastrointestinal: Negative.  Negative for abdominal distention, abdominal pain, anal bleeding, blood in stool, constipation, diarrhea,  nausea, rectal pain and vomiting.   Endocrine: Negative for cold intolerance, heat intolerance, polydipsia, polyphagia and polyuria.   Genitourinary: Negative.  Negative for decreased urine volume, difficulty urinating, dysuria, flank pain, frequency, genital sores, hematuria, penile discharge, penile pain, penile swelling, scrotal swelling, testicular pain and urgency.   Musculoskeletal:  Positive for arthralgias.   Skin: Negative.  Negative for color change, pallor, rash and wound.   Neurological: Negative.  Negative for dizziness, tremors, seizures, syncope, facial asymmetry, speech difficulty, weakness, light-headedness, numbness and headaches.   Psychiatric/Behavioral: Negative.  Negative for agitation, behavioral problems, confusion, decreased concentration, dysphoric mood, hallucinations, self-injury, sleep disturbance and suicidal ideas. The patient is not nervous/anxious and is not hyperactive.    All other systems reviewed and are negative.        Current Outpatient Medications   Medication Sig Dispense Refill    gabapentin 600 MG Oral Tab TAKE 1 TABLET BY MOUTH EVERY MORNING THEN TAKE 3 TABLETS BY MOUTH EVERY NIGHT AT BEDTIME 360 tablet 3    albuterol 108 (90 Base) MCG/ACT Inhalation Aero Soln Inhale 2 puffs into the lungs every 6 (six) hours as needed for Wheezing. 8.5 g 0    Tadalafil 20 MG Oral Tab Take 1 tablet (20 mg total) by mouth daily as needed for Erectile Dysfunction. 20 tablet 2    amLODIPine 10 MG Oral Tab Take 1 tablet (10 mg total) by mouth daily. 90 tablet 0    naproxen 500 MG Oral Tab Take 1 tablet (500 mg total) by mouth 2 (two) times daily with meals. 180 tablet 1    cyclobenzaprine 10 MG Oral Tab Take 1 tablet (10 mg total) by mouth nightly. 90 tablet 0    hydroCHLOROthiazide 12.5 MG Oral Cap Take 1 capsule (12.5 mg total) by mouth daily. 90 capsule 0    buPROPion  MG Oral Tablet 12 Hr Take 1 tablet (150 mg total) by mouth 2 (two) times daily. 180 tablet 1    atorvastatin 40 MG  Oral Tab TAKE 1 TABLET BY MOUTH EVERY NIGHT 90 tablet 3    cyanocobalamine 1000 MCG Oral Tab Take 1 tablet (1,000 mcg total) by mouth daily. 90 tablet 1    ASPIRIN 81 OR Take by mouth.       Allergies:  Allergies   Allergen Reactions    Morphine NAUSEA AND VOMITING       HISTORY:  Past Medical History:    High cholesterol    Tobacco abuse      Past Surgical History:   Procedure Laterality Date    Colonoscopy  07/2019    Colonoscopy N/A 7/25/2019    Procedure: COLONOSCOPY;  Surgeon: Deep Allred MD;  Location: [facility] ENDOSCOPY    Excis infratent brain tumor      Laminectomy  2001      Family History   Problem Relation Age of Onset    Heart Disease Father     Heart Disease Mother     Lung Disorder Mother         emphysema      Social History:   Social History     Socioeconomic History    Marital status:    Tobacco Use    Smoking status: Every Day     Current packs/day: 1.00     Average packs/day: 1 pack/day for 40.0 years (40.0 ttl pk-yrs)     Types: Cigarettes    Smokeless tobacco: Former    Tobacco comments:     tried quitting 4/1/18; back to 1PPD   Substance and Sexual Activity    Alcohol use: No     Alcohol/week: 0.0 standard drinks of alcohol    Drug use: No   Other Topics Concern    Caffeine Concern Yes     Comment: Coffee     Social Determinants of Health      [facility]    Social Connections    [facility]    Housing Stability        PHYSICAL EXAM:   /65 (BP Location: Right arm, Patient Position: Sitting, Cuff Size: adult)   Pulse 85   Temp 98 °F (36.7 °C) (Oral)   Ht 5' 8\" (1.727 m)   Wt 152 lb (68.9 kg)   SpO2 100%   BMI 23.11 kg/m²   BP Readings from Last 3 Encounters:   07/02/24 116/65   01/31/24 133/69   01/10/24 132/70     Wt Readings from Last 3 Encounters:   07/02/24 152 lb (68.9 kg)   01/31/24 154 lb (69.9 kg)   01/10/24 156 lb (70.8 kg)       Physical  Exam  Vitals and nursing note reviewed.   Constitutional:       General: He is not in acute distress.     Appearance: Normal appearance. He is well-developed and well-groomed. He is not ill-appearing, toxic-appearing or diaphoretic.      Interventions: He is not intubated.  HENT:      Head: Normocephalic and atraumatic.      Right Ear: Hearing, tympanic membrane, ear canal and external ear normal. No decreased hearing noted. No laceration, drainage, swelling or tenderness. No middle ear effusion. There is no impacted cerumen. No foreign body. No mastoid tenderness. No PE tube. No hemotympanum. Tympanic membrane is not injected, scarred, perforated, erythematous, retracted or bulging. Tympanic membrane has normal mobility.      Left Ear: Hearing, tympanic membrane, ear canal and external ear normal. No decreased hearing noted. No laceration, drainage, swelling or tenderness.  No middle ear effusion. There is no impacted cerumen. No foreign body. No mastoid tenderness. No PE tube. No hemotympanum. Tympanic membrane is not injected, scarred, perforated, erythematous, retracted or bulging. Tympanic membrane has normal mobility.      Nose:      Right Sinus: No maxillary sinus tenderness or frontal sinus tenderness.      Left Sinus: No maxillary sinus tenderness or frontal sinus tenderness.      Mouth/Throat:      Lips: Pink. No lesions.      Mouth: Mucous membranes are moist. No injury, lacerations, oral lesions or angioedema.      Dentition: Normal dentition. Does not have dentures. No dental tenderness, gingival swelling, dental caries, dental abscesses or gum lesions.      Tongue: No lesions. Tongue does not deviate from midline.      Palate: No mass and lesions.      Pharynx: Oropharynx is clear. Uvula midline. No pharyngeal swelling, oropharyngeal exudate, posterior oropharyngeal erythema or uvula swelling.      Tonsils: No tonsillar exudate or tonsillar abscesses.   Eyes:      General: Lids are normal. No scleral  icterus.        Right eye: No foreign body, discharge or hordeolum.         Left eye: No foreign body, discharge or hordeolum.      Extraocular Movements: Extraocular movements intact.      Right eye: Normal extraocular motion and no nystagmus.      Left eye: Normal extraocular motion and no nystagmus.      Conjunctiva/sclera: Conjunctivae normal.      Right eye: Right conjunctiva is not injected. No chemosis, exudate or hemorrhage.     Left eye: Left conjunctiva is not injected. No chemosis, exudate or hemorrhage.     Pupils: Pupils are equal, round, and reactive to light.   Neck:      Thyroid: No thyroid mass, thyromegaly or thyroid tenderness.      Vascular: No carotid bruit or JVD.      Trachea: Trachea and phonation normal. No tracheal tenderness, tracheostomy, abnormal tracheal secretions or tracheal deviation.   Cardiovascular:      Rate and Rhythm: Normal rate and regular rhythm.      Pulses: Normal pulses.           Carotid pulses are 2+ on the right side and 2+ on the left side.       Radial pulses are 2+ on the right side and 2+ on the left side.        Dorsalis pedis pulses are 2+ on the right side and 2+ on the left side.        Posterior tibial pulses are 2+ on the right side and 2+ on the left side.      Heart sounds: Normal heart sounds, S1 normal and S2 normal.   Pulmonary:      Effort: Pulmonary effort is normal. No tachypnea, bradypnea, accessory muscle usage, prolonged expiration, respiratory distress or retractions. He is not intubated.      Breath sounds: Normal breath sounds and air entry. No stridor, decreased air movement or transmitted upper airway sounds. No decreased breath sounds, wheezing, rhonchi or rales.   Chest:      Chest wall: No tenderness.   Abdominal:      General: Bowel sounds are normal. There is no distension.      Palpations: Abdomen is soft. Abdomen is not rigid.      Tenderness: There is no abdominal tenderness. There is no right CVA tenderness, left CVA tenderness,  guarding or rebound.   Musculoskeletal:      Right shoulder: No swelling, deformity, effusion, laceration, tenderness, bony tenderness or crepitus. Normal range of motion. Normal strength. Normal pulse.      Left shoulder: No swelling, deformity, effusion, laceration (Not able to abduct > 120 degrees and extension backwards limited.), tenderness, bony tenderness or crepitus. Decreased range of motion. Normal strength. Normal pulse.      Left wrist: Swelling, deformity, tenderness, bony tenderness and crepitus present. No effusion, lacerations or snuff box tenderness. Decreased range of motion (Radial flexion limited.). Normal pulse.      Left hand: Normal strength. Decreased sensation of the median distribution. There is no disruption of two-point discrimination. Normal capillary refill. Normal pulse.      Cervical back: Neck supple. Spasms (L paraspinal spasm.) and crepitus present. No swelling, edema, deformity, erythema, signs of trauma, lacerations, rigidity, torticollis, tenderness or bony tenderness. Pain with movement present. Decreased range of motion.      Right lower leg: No edema.      Left lower leg: No edema.      Comments: +Finkelstein's sign L thumb.   +Heberden;'s nodes BL.   Negative Hawking's test L shoulder.   Lymphadenopathy:      Head:      Right side of head: No submental, submandibular, preauricular, posterior auricular or occipital adenopathy.      Left side of head: No submental, submandibular, preauricular, posterior auricular or occipital adenopathy.      Cervical: No cervical adenopathy.      Right cervical: No superficial, deep or posterior cervical adenopathy.     Left cervical: No superficial, deep or posterior cervical adenopathy.      Upper Body:      Right upper body: No supraclavicular adenopathy.      Left upper body: No supraclavicular adenopathy.   Skin:     General: Skin is warm and dry.      Coloration: Skin is not pale.      Findings: No erythema or rash.      Nails: There is  no clubbing.   Neurological:      Mental Status: He is alert and oriented to person, place, and time.      Cranial Nerves: No dysarthria.      Motor: No tremor or seizure activity.   Psychiatric:         Speech: Speech normal.         Behavior: Behavior normal. Behavior is cooperative.              ASSESSMENT/PLAN:     Encounter Diagnoses   Name Primary?    High cholesterol Check blood.    Yes    Essential hypertension Stable.Careful with diet and excercise at least 30 minutes 3-4 times a week. Check blood pressures at different times on different days. Can purchase own blood pressure monitor. If not, check at local pharmacy. Bake foods more and grill occasionally. Avoid fried foods. No salt. Use other seasonings.         Vaccine counseling Up to date.        Snoring Improved per pt. Refreshing sleep.        Adult general medical exam Check urine.        History of meningioma CT brain 3-5-2019: Prior right frontoparietal craniotomy with postop changes status post large right frontal meningioma resection.        Hyperglycemia Check blood and urine.        Prostate cancer screening Check blood.        Tobacco abuse Dw pt. of quitting. Not ready.      Cigarette smoker       Wrist pain, chronic, left Check blood. Hold rheum. and imaging til blood. Cup of ice and rub across for 10 minutes 2 times a day and especially at end of day. Don't leave on. Stretching exercises.  May be combination of carpal tunnel with tendinitis and possibly some osteoarthritis may be some rheumatoid also?  Has carpal tunnel wrist braces.  But would recommend thumb brace at night.  Icing thumb before bed. Hold PT an dimaging and orthopedics per pt.       Cervical radiculopathy. Hold PT and imaging.  DW pt. of options. Try increasing gabapentin to 600 in the morning and 3 of the 600 mg at night.  Discussed about side effects and use.    Orders Placed This Encounter   Procedures    Lipid Panel    CBC With Differential With Platelet    Comp  Metabolic Panel (14)    Hemoglobin A1C    TSH W Reflex To Free T4    PSA Total, Screen    URINALYSIS, AUTO, W/O SCOPE    Sed Rate, Westergren (Automated)    Cyclic Citrullinate Pep. IGG    Rheumatoid Arthritis Factor    Tobacco Use Counseling 3-10 Min [86626]       Meds This Visit:  Requested Prescriptions     Signed Prescriptions Disp Refills    gabapentin 600 MG Oral Tab 360 tablet 3     Sig: TAKE 1 TABLET BY MOUTH EVERY MORNING THEN TAKE 3 TABLETS BY MOUTH EVERY NIGHT AT BEDTIME       Imaging & Referrals:  None      RTC 4 months fvor FU arthralgias.     Tobacco cessation counseling for 3-10 minutes (add E/M code #[id number]).

## 2024-07-02 NOTE — PATIENT INSTRUCTIONS
ASSESSMENT/PLAN:     Encounter Diagnoses   Name Primary?    High cholesterol Check blood.    Yes    Essential hypertension Stable.Careful with diet and excercise at least 30 minutes 3-4 times a week. Check blood pressures at different times on different days. Can purchase own blood pressure monitor. If not, check at local pharmacy. Bake foods more and grill occasionally. Avoid fried foods. No salt. Use other seasonings.         Vaccine counseling Up to date.        Snoring Improved per pt. Refreshing sleep.        Adult general medical exam Check urine.        History of meningioma CT brain 3-5-2019: Prior right frontoparietal craniotomy with postop changes status post large right frontal meningioma resection.        Hyperglycemia Check blood and urine.        Prostate cancer screening Check blood.        Tobacco abuse Dw pt. of quitting. Not ready.      Cigarette smoker       Wrist pain, chronic, left Check blood. Hold rheum. And imaging til blood. Cup of ice and rub across for 10 minutes 2 times a day and especially at end of day. Don't leave on. Stretching exercises.  May be combination of carpal tunnel with tendinitis and possibly some osteoarthritis may be some rheumatoid also?  Has carpal tunnel wrist braces.  But would recommend thumb brace at night.  Icing thumb before bed.  Hold PT an dimaging and orthopedics per pt.       Cervical radiculopathy. Hold PT and imaging.  DW pt. of options. Try increasing gabapentin to 600 in the morning and 3 of the 600 mg at night.  Discussed about side effects and use.    Orders Placed This Encounter   Procedures    Lipid Panel    CBC With Differential With Platelet    Comp Metabolic Panel (14)    Hemoglobin A1C    TSH W Reflex To Free T4    PSA Total, Screen    URINALYSIS, AUTO, W/O SCOPE    Sed Rate, Westergren (Automated)    Cyclic Citrullinate Pep. IGG    Rheumatoid Arthritis Factor    Tobacco Use Counseling 3-10 Min [05360]       Meds This Visit:  Requested  Prescriptions     Signed Prescriptions Disp Refills    gabapentin 600 MG Oral Tab 360 tablet 3     Sig: TAKE 1 TABLET BY MOUTH EVERY MORNING THEN TAKE 3 TABLETS BY MOUTH EVERY NIGHT AT BEDTIME       Imaging & Referrals:  None      RTC 4 months fvor FU arthralgias.     Quitting Smoking    Quitting smoking is the most important step you can take to improve your health. We're glad you have set a goal to improve your health.    Quit Smoking Resources    In addition to medications, use the STAR plan to help you successfully quit.   Stick with your quit date!   Tell friends, family, and coworkers your quit date. Request their understanding and support.  Anticipate and prepare for challenges. Some examples are withdrawal symptoms, being around others who smoke, and drinking alcohol.  Remove all tobacco products and paraphernalia from your environment. Make your home and vehicles smoke-free.    Free resources for additional support:  National tobacco quitline: 1-800-QUIT-NOW (1-326.932.3097).  SmokefreeTXT is a free text program to assist you in quitting. Visit https://www.smokefrGiggle.gov/smokefreetxt for more information.  Feel free to call your care manager at ([phone]) for additional support.    Getting Support for Quitting Smoking  You don’t have to go through the process of quitting smoking without support. Tell people you are quitting. The support of friends, coworkers, and family members can make a big difference. Face-to-face or telephone counseling can also be helpful, as can a stop-smoking class or an ex-smokers’ group.     Set a quit date  If you’re serious about quitting smoking, choose a specific quit date within the next 2 to 4 weeks. Tito it in bright, bold letters on a calendar you use often. Tell people about your quit date. Ask for their support. Let your friends, coworkers, and family know how they can help you quit.   Make a contract  A quit-smoking contract gives you a goal. Write out the contract  and sign it. Have it witnessed, if you like. Then keep the contract where you’ll see it often, or carry it with you. Read the contract when you’re tempted to smoke.   Take action  On the day you quit, reread your quit contract. Think about the benefits you gain by quitting, such as better health and an improved sense of taste, as well as the money you will save from not smoking. Also:   Remove cigarettes from your home, car, or any other place where you stash them.  Throw away all smoking materials, including matches, lighters, and ashtrays.  Review your list of triggers and your plan for coping with each of them.  Stay away from people or settings you link with smoking.  Make a quit kit that includes gum, mints, carrot sticks, and things to keep your hands and mouth busy.  Talk to your healthcare provider about using quit-smoking products, such as medicine or a nicotine patch, inhaler, nasal spray, gum, or lozenges.  Ask for help  Sometimes you may just need to talk when you miss smoking. Ex-smokers are good to talk to, because they’re likely to know how you feel. You may need extra support in the first few weeks after you quit. Ask a friend to call you each day to see how you’re doing. Telephone counseling can also help you keep on track. Ask your healthcare provider, local hospital, or public health department to put you in touch with a phone counselor. You may also have to deal with doubters when you decide to quit. Explain to any doubters why you are quitting. Tell them that quitting is important to you. Ask for their support.   Tell your smoking buddies that you can walk together instead of smoking together. If someone thinks you won’t succeed, say that you have a good quit plan and ask for their support. Let them know you’re sticking with it. If they can't give you support, consider limiting contact with them for a while.   Stay positive, and if you slip, start again. Quitting smoking often requires repeated  attempts. But smokers do quit for good. It's hard, but with determination and support, you can do it.   To learn more  Get more tips from these resources:  CDC at www.cdc.gov/tobacco/quit_smoking  or 800-QUIT-NOW (709-427-8727)  National [facility] [place] at www.smokefree.gov  or 877-44U-QUIT (503-813-0404)  American Lung Association at www.lung.org/stop-smoking  or 800-LUNGUSA (039-813-6406)  Isidro last reviewed this educational content on 1/1/2022  © 8944-3444 [employer]. All rights reserved. This information is not intended as a substitute for professional medical care. Always follow your healthcare professional's instructions.

## 2024-07-03 NOTE — ASSESSMENT & PLAN NOTE
CT brain 3-5-2019: Prior right frontoparietal craniotomy with postop changes status post large right frontal meningioma resection.

## 2024-07-18 NOTE — TELEPHONE ENCOUNTER
[facility] ===patient insurance prescription.    They don't cover the gabapentin 600 mg four tablets a day .    Either ;  Start on PA === #[phone]  Dosages that covered ===gabapentin 600 mg three times a day     OR   gabapentin 800 mg twice a day .  OR switch to another medication.       MEDICATION RECORD :  gabapentin 600 MG Oral Tab 360 tablet 3 7/2/2024 --    Sig: TAKE 1 TABLET BY MOUTH EVERY MORNING THEN TAKE 3 TABLETS BY MOUTH EVERY NIGHT AT BEDTIME    Sent to pharmacy as: Gabapentin 600 MG Oral Tablet (Neurontin)    E-Prescribing Status: Receipt confirmed by pharmacy (7/2/2024  6:46 PM CDT)      Pharmacy    [id number] [place] [address] AT [facility], [phone], [phone]

## 2024-07-18 NOTE — TELEPHONE ENCOUNTER
Can we check with patient what he wants to do in terms of the gabapentin and then update the med list.

## 2024-07-19 RX ORDER — ALBUTEROL SULFATE 90 UG/1
2 AEROSOL, METERED RESPIRATORY (INHALATION) EVERY 6 HOURS PRN
Qty: 8.5 G | Refills: 3 | Status: SHIPPED | OUTPATIENT
Start: 2024-07-19

## 2024-07-19 NOTE — TELEPHONE ENCOUNTER
Please review. Rx failed/no protocol.    Requested Prescriptions   Pending Prescriptions Disp Refills    ALBUTEROL 108 (90 Base) MCG/ACT Inhalation Aero Soln [Pharmacy Med Name: ALBUTEROL HFA INH (200 PUFFS) 8.5GM] 8.5 g 0     Sig: INHALE 2 PUFFS INTO THE LUNGS EVERY 6 HOURS AS NEEDED FOR WHEEZING       Asthma & COPD Medication Protocol Failed - 7/16/2024  1:38 PM        Failed - Asthma Action Score greater than or equal to 20        Failed - AAP/ACT given in last 12 months     No data recorded  No data recorded  No data recorded  No data recorded          Passed - Appointment in past 6 or next 3 months      Recent Outpatient Visits              2 weeks ago High cholesterol    [facility] Kath Morris MD    Office Visit    5 months ago [facility]    6 [facility] ago [facility] Visit    7 months ago Primary hypertension    [facility] Lena Fuchs MD    Office Visit    9 months ago Viral syndrome    [facility], Virtual Visit Paige Zepeda PA    E-Visit          Future Appointments         Provider Department Appt Notes    In 4 months Kath Morris MD [facility] 4 month fu    In 6 months Kath Morris MD [facility]                          Future Appointments         Provider Department Appt Notes    In 4 months Kath Morris MD [facility] 4 month fu    In 6 months Kath Morris MD [facility]           Recent Outpatient Visits              2 weeks ago High cholesterol    [facility] Arturo  Kath TOLLIVER MD    Office Visit    5 months ago Primary hypertension    [facility], Lena Cao MD    Office Visit    6 months ago Primary hypertension    [facility], Lena Cao MD    Office Visit    7 months ago Primary hypertension    [facility], Lena Cao MD    Office Visit    9 months ago Viral syndrome    [facility], Virtual Visit Paige Zepeda PA    E-Visit

## 2024-07-22 NOTE — TELEPHONE ENCOUNTER
Patient has not yet read the [employer] message.     Gabapentin medication  Message [phone]  From  Marisa Isaac RN To  Star Velazquez Sent and Delivered  7/19/2024  8:45 AM   Last Read in [employer]  Not Read       RN called and left a message to call back regarding his gabapentin medication, office number and hours provided. ===second attempt.

## 2024-07-23 RX ORDER — GABAPENTIN 600 MG/1
TABLET ORAL
Qty: 270 TABLET | Refills: 3 | Status: SHIPPED | OUTPATIENT
Start: 2024-07-23

## 2024-07-23 NOTE — TELEPHONE ENCOUNTER
New prescription was sent by PCP.     Left detailed message as per RANDELL on file. Advised to call back if has further questions.

## 2024-07-23 NOTE — TELEPHONE ENCOUNTER
Pended gabapentin order for 3 x 600 MG daily, if appropriate.     MyChart message still not read.   Left message to call office back on patient's cell.   Spoke to wife Sarah (on RANDELL) and explained the problem with gabapentin.     Condition update:     Patient took 3 gabapentin 600 MG at bedtime once. Patient felt very sick and went back to taking 1 gabapentin 600 MG in the morning and 2 gabapentin 600 MG at bedtime.   Patient is doing better on this dosing.     Sarah checked and patient has about 40 tablets left.   Confirmed pharmacy is [id number].

## 2024-08-06 RX ORDER — TADALAFIL 20 MG/1
20 TABLET ORAL
Qty: 20 TABLET | Refills: 2 | Status: SHIPPED | OUTPATIENT
Start: 2024-08-06

## 2024-08-06 NOTE — TELEPHONE ENCOUNTER
Refill passed per [facility] protocol.  Requested Prescriptions   Pending Prescriptions Disp Refills    Tadalafil 20 MG Oral Tab 20 tablet 2     Sig: Take 1 tablet (20 mg total) by mouth daily as needed for Erectile Dysfunction.       Genitourinary Medications Passed - 8/1/2024  5:15 PM        Passed - Patient does not have pulmonary hypertension on problem list        Passed - In person appointment or virtual visit in the past 12 mos or appointment in next 3 mos     Recent Outpatient Visits              1 month ago [facility] cholesterol    [facility] [place] [place] [facility]    Office Visit    6 months ago Primary hypertension    [facility]    Office Visit    6 months ago [facility]    [facility]    7 months ago [facility]    Office Visit    9 months ago Viral syndrome    [facility] [place] [facility], Virtual Visit Paige Zepeda PA    E-Visit          Future Appointments         Provider Department Appt Notes    In 4 months Kath Morris MD [facility] 4 month fu    In 5 months Kath Morris MD [facility]                        Recent Outpatient Visits              1 month ago High cholesterol    [facility] [place] [facility]    Office Visit    6 months ago [facility] hypertension    [facility]    [facility] Visit    6 months ago [facility]    Office Visit    7 months ago [facility] hypertension    [facility]     Office Visit    9 months ago Viral syndrome    [facility], Virtual Visit Paige Zepeda PA    E-Visit          Future Appointments         Provider Department Appt Notes    In 4 months Kath Morris MD [facility] 4 month fu    In 5 months Kath Morris MD [facility]

## 2024-09-21 RX ORDER — TADALAFIL 20 MG/1
20 TABLET ORAL
Qty: 20 TABLET | Refills: 2 | Status: SHIPPED | OUTPATIENT
Start: 2024-09-21

## 2024-09-21 NOTE — TELEPHONE ENCOUNTER
Refill passed per [facility] protocol.     Requested Prescriptions   Pending Prescriptions Disp Refills    Tadalafil 20 MG Oral Tab 20 tablet 2     Sig: Take 1 tablet (20 mg total) by mouth daily as needed for Erectile Dysfunction.       Genitourinary Medications Passed - 9/17/2024  3:58 PM        Passed - Patient does not have pulmonary hypertension on problem list        Passed - In person appointment or virtual visit in the past 12 mos or appointment in next 3 mos     Recent Outpatient Visits              2 months ago [facility]    Office Visit    7 months ago [facility] hypertension    [facility] [place] [facility]    Office Visit    8 months ago [facility] hypertension    [facility]    Office Visit    9 months ago Primary hypertension    [facility] L[facility] Cao [facility]    Office Visit    11 months ago Viral syndrome    [facility], Virtual Visit Paige Zepeda PA    E-Visit          Future Appointments         Provider Department Appt Notes    In 2 months Kath Morris MD [facility] 4 month fu    In 4 months Kath Morris MD [facility]

## 2024-09-30 RX ORDER — ATORVASTATIN CALCIUM 40 MG/1
TABLET, FILM COATED ORAL
Qty: 90 TABLET | Refills: 3 | Status: SHIPPED | OUTPATIENT
Start: 2024-09-30

## 2024-09-30 RX ORDER — AMLODIPINE BESYLATE 10 MG/1
10 TABLET ORAL DAILY
Qty: 90 TABLET | Refills: 3 | Status: SHIPPED | OUTPATIENT
Start: 2024-09-30

## 2024-09-30 RX ORDER — AMLODIPINE BESYLATE 10 MG/1
10 TABLET ORAL DAILY
Qty: 90 TABLET | Refills: 0 | OUTPATIENT
Start: 2024-09-30

## 2024-09-30 RX ORDER — ATORVASTATIN CALCIUM 40 MG/1
TABLET, FILM COATED ORAL
Qty: 90 TABLET | Refills: 3 | OUTPATIENT
Start: 2024-09-30

## 2024-09-30 NOTE — TELEPHONE ENCOUNTER
Please review. Protocol Failed; No Protocol    Requested Prescriptions   Pending Prescriptions Disp Refills    ATORVASTATIN 40 MG Oral Tab [Pharmacy Med Name: ATORVASTATIN 40MG TABLETS] 90 tablet 3     Sig: TAKE 1 TABLET BY MOUTH EVERY NIGHT       Cholesterol Medication Protocol Failed - 9/24/2024  9:51 AM        Failed - ALT < 80     Lab Results   Component Value Date    ALT 22 01/21/2023             Failed - ALT resulted within past year        Failed - Lipid panel within past 12 months     Lab Results   Component Value Date    CHOLEST 118 01/21/2023    TRIG 95 01/21/2023    HDL 37 (L) 01/21/2023    LDL 63 01/21/2023    VLDL 14 01/21/2023    NONHDLC 81 01/21/2023             Passed - In person appointment or virtual visit in the past 12 mos or appointment in next 3 mos     Recent Outpatient Visits              3 months ago High cholesterol    [facility] Kath Morris MD    Office Visit    8 months ago [facility] hypertension    [facility]    Office Visit    8 months ago Primary hypertension    [facility] Lena Cao MD    Office Visit    9 months ago Primary hypertension    [facility] Lena Cao MD    Office Visit    11 months ago Viral syndrome    [facility], Virtual Visit Paige Zepeda PA    E-Visit          Future Appointments         Provider Department Appt Notes    In 2 months Kath Morris MD [facility] 4 month fu    In 3 months Kath Morris MD [facility]                            Future Appointments         Provider Department Appt Notes    In 2 months Kath Morris MD [facility] 4 month fu    In 3 months Kath Morris MD [facility]  [facility], [facility], [facility]           Recent Outpatient Visits              3 months ago High cholesterol    [facility], Kath Cruz MD    Office Visit    8 months ago Primary hypertension    [facility], Lena Cao MD    Office Visit    8 months ago Primary hypertension    [facility], Lena Cao MD    Office Visit    9 months ago Primary hypertension    [facility] Lena Cao MD    Office Visit    11 months ago Viral syndrome    [facility] [place] Virtual Visit Paige Zepeda PA    E-Visit

## 2024-09-30 NOTE — TELEPHONE ENCOUNTER
Please review. Protocol Failed; No Protocol    Message sent to patient to complete labs     Requested Prescriptions   Pending Prescriptions Disp Refills    AMLODIPINE 10 MG Oral Tab [Pharmacy Med Name: AMLODIPINE BESYLATE 10MG TABLETS] 90 tablet 0     Sig: TAKE 1 TABLET(10 MG) BY MOUTH DAILY       Hypertension Medications Protocol Failed - 9/24/2024  9:51 AM        Failed - CMP or BMP in past 12 months        Failed - EGFRCR or GFRNAA > 50     GFR Evaluation            Passed - Last BP reading less than 140/90     BP Readings from Last 1 Encounters:   07/02/24 116/65               Passed - In person appointment or virtual visit in the past 12 mos or appointment in next 3 mos     Recent Outpatient Visits              3 months ago High cholesterol    [facility] Kath Morris MD    Office Visit    8 months ago Primary hypertension    [facility] [place] [facility] Lena Cao MD    Office Visit    8 months ago Primary hypertension    [facility], Lena Cao MD    Office Visit    9 months ago [facility] hypertension    [facility]    Office Visit    11 months ago Viral syndrome    [facility], Virtual Visit Paige Zepeda PA    E-Visit          Future Appointments         Provider Department Appt Notes    In 2 months Kath Morris MD [facility] 4 month fu    In 3 months Kath Morris MD [facility]                            Future Appointments         Provider Department Appt Notes    In [facility] months [facility]    In 3 months [facility]           Recent Outpatient Visits              3 months ago High  cholesterol    [facility], Kath Cruz MD    Office Visit    8 months ago Primary hypertension    [facility], Lena Cao MD    Office Visit    8 months ago Primary hypertension    [facility], Lena Cao MD    Office Visit    9 months ago Primary hypertension    [facility], Lena Cao MD    Office Visit    11 months ago Viral syndrome    [facility], Virtual Visit Paige Zepeda PA    E-Visit

## 2024-10-03 RX ORDER — ATORVASTATIN CALCIUM 40 MG/1
TABLET, FILM COATED ORAL
Qty: 90 TABLET | Refills: 3 | OUTPATIENT
Start: 2024-10-03

## 2024-11-13 RX ORDER — TADALAFIL 20 MG/1
20 TABLET ORAL
Qty: 20 TABLET | Refills: 2 | Status: SHIPPED | OUTPATIENT
Start: 2024-11-13

## 2024-11-13 NOTE — TELEPHONE ENCOUNTER
REFILL PASSED PER [facility] PROTOCOLS    Requested Prescriptions   Pending Prescriptions Disp Refills    Tadalafil 20 MG Oral Tab 20 tablet 2     Sig: Take 1 tablet (20 mg total) by mouth daily as needed for Erectile Dysfunction.       Genitourinary Medications Passed - 11/13/2024 12:18 PM        Passed - Patient does not have pulmonary hypertension on problem list        Passed - In person appointment or virtual visit in the past 12 mos or appointment in next 3 mos     Recent Outpatient Visits              4 months ago [facility] cholesterol    [facility]    Office Visit    9 months ago Primary hypertension    [facility] L[facility] Cao MD    Office Visit    10 months ago Primary hypertension    [facility] Ca[facility]    Office Visit    10 months ago Primary hypertension    [facility]    Office Visit    1 year ago Viral syndrome    [facility], Virtual Visit Paige Zepeda PA    E-Visit          Future Appointments         Provider Department Appt Notes    In 3 weeks Kath Morris MD [facility] 4 month fu    In 2 months Kath Morris MD [facility]                          Future Appointments         Provider Department Appt Notes    In 3 weeks Kath Morris MD [facility] 4 month fu    In 2 months Kath Morris MD [facility]           Recent Outpatient Visits              4 months ago High cholesterol    [facility]    Office Visit    9 months ago Primary hypertension    [facility]    Office Visit    10 months ago Primary hypertension    [facility], Lena Cao MD    Office Visit    10 months ago Primary hypertension    [facility], Lena Cao MD    Office Visit    1 year ago Viral syndrome    [facility], Virtual Visit Paige Zepeda PA    E-Visit

## 2024-11-18 RX ORDER — TADALAFIL 20 MG/1
20 TABLET ORAL
Qty: 20 TABLET | Refills: 2 | OUTPATIENT
Start: 2024-11-18

## 2024-12-10 ENCOUNTER — OFFICE VISIT (OUTPATIENT)
Dept: INTERNAL MEDICINE CLINIC | Facility: CLINIC | Age: 64
End: 2024-12-10

## 2024-12-10 VITALS
HEART RATE: 77 BPM | OXYGEN SATURATION: 99 % | BODY MASS INDEX: 21.82 KG/M2 | RESPIRATION RATE: 17 BRPM | SYSTOLIC BLOOD PRESSURE: 138 MMHG | DIASTOLIC BLOOD PRESSURE: 72 MMHG | TEMPERATURE: 98 F | WEIGHT: 144 LBS | HEIGHT: 68 IN

## 2024-12-10 DIAGNOSIS — E78.00 HIGH CHOLESTEROL: Primary | ICD-10-CM

## 2024-12-10 DIAGNOSIS — R73.9 HYPERGLYCEMIA: ICD-10-CM

## 2024-12-10 DIAGNOSIS — Z71.85 VACCINE COUNSELING: ICD-10-CM

## 2024-12-10 DIAGNOSIS — Z72.0 TOBACCO ABUSE: ICD-10-CM

## 2024-12-10 DIAGNOSIS — R23.4 THINNING OF SKIN: ICD-10-CM

## 2024-12-10 DIAGNOSIS — I10 PRIMARY HYPERTENSION: ICD-10-CM

## 2024-12-10 PROCEDURE — 3078F DIAST BP <80 MM HG: CPT | Performed by: INTERNAL MEDICINE

## 2024-12-10 PROCEDURE — 3008F BODY MASS INDEX DOCD: CPT | Performed by: INTERNAL MEDICINE

## 2024-12-10 PROCEDURE — 99406 BEHAV CHNG SMOKING 3-10 MIN: CPT | Performed by: INTERNAL MEDICINE

## 2024-12-10 PROCEDURE — 99214 OFFICE O/P EST MOD 30 MIN: CPT | Performed by: INTERNAL MEDICINE

## 2024-12-10 PROCEDURE — 3075F SYST BP GE 130 - 139MM HG: CPT | Performed by: INTERNAL MEDICINE

## 2024-12-10 RX ORDER — ZINC SULFATE 50(220)MG
220 CAPSULE ORAL DAILY
COMMUNITY

## 2024-12-10 RX ORDER — AMLODIPINE BESYLATE 5 MG/1
5 TABLET ORAL DAILY
Qty: 30 TABLET | Refills: 3 | Status: SHIPPED | OUTPATIENT
Start: 2024-12-10

## 2024-12-10 RX ORDER — NAPROXEN 500 MG/1
500 TABLET ORAL 2 TIMES DAILY WITH MEALS
Qty: 180 TABLET | Refills: 1 | Status: SHIPPED | OUTPATIENT
Start: 2024-12-10

## 2024-12-10 RX ORDER — AMLODIPINE BESYLATE 10 MG/1
10 TABLET ORAL DAILY
Qty: 90 TABLET | Refills: 3 | Status: SHIPPED | OUTPATIENT
Start: 2024-12-10 | End: 2024-12-10

## 2024-12-10 RX ORDER — HYDROCHLOROTHIAZIDE 12.5 MG/1
12.5 CAPSULE ORAL DAILY
Qty: 90 CAPSULE | Refills: 0 | Status: SHIPPED | OUTPATIENT
Start: 2024-12-10

## 2024-12-10 RX ORDER — BUPROPION HYDROCHLORIDE 150 MG/1
150 TABLET, EXTENDED RELEASE ORAL 2 TIMES DAILY
Qty: 180 TABLET | Refills: 1 | Status: SHIPPED | OUTPATIENT
Start: 2024-12-10

## 2024-12-10 RX ORDER — BETAMETHASONE DIPROPIONATE 0.5 MG/G
30 CREAM TOPICAL 2 TIMES DAILY
Qty: 50 G | Refills: 2 | Status: SHIPPED | OUTPATIENT
Start: 2024-12-10

## 2024-12-10 RX ORDER — CYCLOBENZAPRINE HCL 10 MG
10 TABLET ORAL NIGHTLY
Qty: 90 TABLET | Refills: 0 | Status: SHIPPED | OUTPATIENT
Start: 2024-12-10

## 2024-12-10 NOTE — PATIENT INSTRUCTIONS
ASSESSMENT/PLAN:     Encounter Diagnoses   Name Primary?    High cholesterol Check blood.    Yes    Primary hypertension Higher. Decrease amlodipine to 5 mg a day. Was not taking > 1 month. Call in 2 weeks with Bp's. Careful with diet and excercise at least 30 minutes 3-4 times a week. Check blood pressures at different times on different days. Can purchase own blood pressure monitor. If not, check at local pharmacy. Bake foods more and grill occasionally. Avoid fried foods. No salt. Use other seasonings.         Hyperglycemia Check blood.        Tobacco abuse DW pt. of quitting. Set date for 1-1-25.        Vaccine counseling Holding all vaccines including pneumonia 20 influenza vaccine Tdap and RSV vaccines.       Thinning of skin STOP zinc and ASA. Hydrate at least 48 oz. Water a day. Cut back on 7 up and caffeine.         Orders Placed This Encounter   Procedures    Tobacco Use Counseling 3-10 Min [48116]       Meds This Visit:  Requested Prescriptions     Signed Prescriptions Disp Refills    buPROPion  MG Oral Tablet 12 Hr 180 tablet 1     Sig: Take 1 tablet (150 mg total) by mouth 2 (two) times daily.    cyclobenzaprine 10 MG Oral Tab 90 tablet 0     Sig: Take 1 tablet (10 mg total) by mouth nightly.    hydroCHLOROthiazide 12.5 MG Oral Cap 90 capsule 0     Sig: Take 1 capsule (12.5 mg total) by mouth daily.    naproxen 500 MG Oral Tab 180 tablet 1     Sig: Take 1 tablet (500 mg total) by mouth 2 (two) times daily with meals.    amLODIPine 5 MG Oral Tab 30 tablet 3     Sig: Take 1 tablet (5 mg total) by mouth daily.    Betamethasone Dipropionate Aug 0.05 % External Cream 50 g 2     Sig: Apply 30 g topically 2 (two) times daily.       Imaging & Referrals:  CT LUNG LD SCREENING(CPT=71271)      RTC 3 months for HTN FU.   RTC 7-2-25 for physical.   Quitting Smoking    Quitting smoking is the most important step you can take to improve your health. We're glad you have set a goal to improve your health.    Quit  Smoking Resources    In addition to medications, use the STAR plan to help you successfully quit.   Stick with your quit date!   Tell friends, family, and coworkers your quit date. Request their understanding and support.  Anticipate and prepare for challenges. Some examples are withdrawal symptoms, being around others who smoke, and drinking alcohol.  Remove all tobacco products and paraphernalia from your environment. Make your home and vehicles smoke-free.    Free resources for additional support:  National tobacco quitline: 1-800-QUIT-NOW (1-112.426.9054).  SmokefreeTXT is a free text program to assist you in quitting. Visit https://www.smokefree.gov/smokefreetxt for more information.  Feel free to call your care manager at ([phone]) for additional support.    Getting Support for Quitting Smoking  You don’t have to go through the process of quitting smoking without support. Tell people you are quitting. The support of friends, coworkers, and family members can make a big difference. Face-to-face or telephone counseling can also be helpful, as can a stop-smoking class or an ex-smokers’ group.     Set a quit date  If you’re serious about quitting smoking, choose a specific quit date within the next 2 to 4 weeks. Tito it in bright, bold letters on a calendar you use often. Tell people about your quit date. Ask for their support. Let your friends, coworkers, and family know how they can help you quit.   Make a contract  A quit-smoking contract gives you a goal. Write out the contract and sign it. Have it witnessed, if you like. Then keep the contract where you’ll see it often, or carry it with you. Read the contract when you’re tempted to smoke.   Take action  On the day you quit, reread your quit contract. Think about the benefits you gain by quitting, such as better health and an improved sense of taste, as well as the money you will save from not smoking. Also:   Remove cigarettes from your home, car, or any  other place where you stash them.  Throw away all smoking materials, including matches, lighters, and ashtrays.  Review your list of triggers and your plan for coping with each of them.  Stay away from people or settings you link with smoking.  Make a quit kit that includes gum, mints, carrot sticks, and things to keep your hands and mouth busy.  Talk to your healthcare provider about using quit-smoking products, such as medicine or a nicotine patch, inhaler, nasal spray, gum, or lozenges.  Ask for help  Sometimes you may just need to talk when you miss smoking. Ex-smokers are good to talk to, because they’re likely to know how you feel. You may need extra support in the first few weeks after you quit. Ask a friend to call you each day to see how you’re doing. Telephone counseling can also help you keep on track. Ask your healthcare provider, local hospital, or public health department to put you in touch with a phone counselor. You may also have to deal with doubters when you decide to quit. Explain to any doubters why you are quitting. Tell them that quitting is important to you. Ask for their support.   Tell your smoking buddies that you can walk together instead of smoking together. If someone thinks you won’t succeed, say that you have a good quit plan and ask for their support. Let them know you’re sticking with it. If they can't give you support, consider limiting contact with them for a while.   Stay positive, and if you slip, start again. Quitting smoking often requires repeated attempts. But smokers do quit for good. It's hard, but with determination and support, you can do it.   To learn more  Get more tips from these resources:  CDC at www.cdc.gov/tobacco/quit_smoking  or 800-QUIT-NOW (128-300-3504)  National [employer] [place] at www.smokefree.gov  or 877-44U-QUIT (310-450-4487)  American Lung Association at www.lung.org/stop-smoking  or 800-LUNGUSA (178-277-6769)  [employer] last reviewed this educational  content on 1/1/2022  © 8749-0190 [employer]. All rights reserved. This information is not intended as a substitute for professional medical care. Always follow your healthcare professional's instructions.

## 2024-12-10 NOTE — PROGRESS NOTES
HPI:    Patient ID: Nick Velazquez is a 64 year old male.    Hypertension  Patient is here for follow up of hypertension. BP at home: not check.   Has Not been compliant with medications.  Ran out of medicines more than 2 weeks ago and has not called office for refill.  Exercise level: somewhat active (works) and has not been following low salt diet.  Weight has been stable. Wife cooks more at home. Adds added salt but a little bit per patient.  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  Wt Readings from Last 3 Encounters:   12/10/24 144 lb (65.3 kg)   07/02/24 152 lb (68.9 kg)   01/31/24 154 lb (69.9 kg)     BP Readings from Last 3 Encounters:   12/10/24 138/72   07/02/24 116/65   01/31/24 133/69     Labs:   Lab Results   Component Value Date/Time     (H) 01/21/2023 08:36 AM     01/21/2023 08:36 AM    K 4.3 01/21/2023 08:36 AM     01/21/2023 08:36 AM    CO2 27.0 01/21/2023 08:36 AM    CREATSERUM 0.86 01/21/2023 08:36 AM    CA 8.7 01/21/2023 08:36 AM    AST 15 01/21/2023 08:36 AM    ALT 22 01/21/2023 08:36 AM    TSH 0.804 01/21/2023 08:36 AM    T4F 0.87 04/01/2018 09:45 AM        Lab Results   Component Value Date/Time    CHOLEST 118 01/21/2023 08:36 AM    HDL 37 (L) 01/21/2023 08:36 AM    TRIG 95 01/21/2023 08:36 AM    LDL 63 01/21/2023 08:36 AM    NONHDLC 81 01/21/2023 08:36 AM            Wt Readings from Last 3 Encounters:   12/10/24 144 lb (65.3 kg)   07/02/24 152 lb (68.9 kg)   01/31/24 154 lb (69.9 kg)     BP Readings from Last 3 Encounters:   12/10/24 138/72   07/02/24 116/65   01/31/24 133/69     Labs:   Lab Results   Component Value Date/Time     (H) 01/21/2023 08:36 AM     01/21/2023 08:36 AM    K 4.3 01/21/2023 08:36 AM     01/21/2023 08:36 AM    CO2 27.0 01/21/2023 08:36 AM    CREATSERUM 0.86 01/21/2023 08:36 AM    CA 8.7 01/21/2023 08:36 AM    AST 15  01/21/2023 08:36 AM    ALT 22 01/21/2023 08:36 AM    TSH 0.804 01/21/2023 08:36 AM    T4F 0.87 04/01/2018 09:45 AM        Lab Results   Component Value Date/Time    CHOLEST 118 01/21/2023 08:36 AM    HDL 37 (L) 01/21/2023 08:36 AM    TRIG 95 01/21/2023 08:36 AM    LDL 63 01/21/2023 08:36 AM    NONHDLC 81 01/21/2023 08:36 AM          Has a set quit date for the first of the year to quit smoking.  Getting too expensive.    Has noticed thinning of skin especially on arms minor trauma causes bruising and bleeding.  And skin is lost.  In right mid forearm hit arm against a picnic table and skin fell off.  Drinks 2 cups of coffee in the morning larger cups.  And then throughout the dry drinks coffee.  When gets home for dinner has 7 does not drink water.          Review of Systems   Constitutional: Negative.  Negative for activity change, appetite change, chills, diaphoresis, fatigue, fever and unexpected weight change.   HENT:  Negative for congestion, mouth sores, postnasal drip, rhinorrhea, sinus pressure, sneezing, sore throat, trouble swallowing and voice change.    Eyes:  Negative for pain.   Respiratory:  Negative for apnea, cough, choking, chest tightness, shortness of breath, wheezing and stridor.    Cardiovascular:  Negative for chest pain, palpitations and leg swelling.   Gastrointestinal:  Negative for abdominal distention, abdominal pain, anal bleeding, blood in stool, diarrhea, nausea, rectal pain and vomiting.   Endocrine: Negative for cold intolerance, heat intolerance, polydipsia, polyphagia and polyuria.   Musculoskeletal:  Negative for neck pain.   Neurological:  Negative for dizziness, tremors, seizures, syncope, facial asymmetry, speech difficulty, weakness, light-headedness, numbness and headaches.   Psychiatric/Behavioral:  The patient is not nervous/anxious.    All other systems reviewed and are negative.        Current Outpatient Medications   Medication Sig Dispense Refill    buPROPion  MG  Oral Tablet 12 Hr Take 1 tablet (150 mg total) by mouth 2 (two) times daily. 180 tablet 1    cyclobenzaprine 10 MG Oral Tab Take 1 tablet (10 mg total) by mouth nightly. 90 tablet 0    hydroCHLOROthiazide 12.5 MG Oral Cap Take 1 capsule (12.5 mg total) by mouth daily. 90 capsule 0    naproxen 500 MG Oral Tab Take 1 tablet (500 mg total) by mouth 2 (two) times daily with meals. 180 tablet 1    Cholecalciferol (VITAMIN D3) 25 MCG (1000 UT) Oral Cap Take 1 tablet by mouth daily.      zinc sulfate 220 (50 Zn) MG Oral Cap Take 1 capsule (220 mg total) by mouth daily.      amLODIPine 5 MG Oral Tab Take 1 tablet (5 mg total) by mouth daily. 30 tablet 3    Betamethasone Dipropionate Aug 0.05 % External Cream Apply 30 g topically 2 (two) times daily. 50 g 2    Tadalafil 20 MG Oral Tab Take 1 tablet (20 mg total) by mouth daily as needed for Erectile Dysfunction. 20 tablet 2    atorvastatin 40 MG Oral Tab TAKE 1 TABLET BY MOUTH EVERY NIGHT 90 tablet 3    gabapentin 600 MG Oral Tab TAKE 1 TABLET BY MOUTH EVERY MORNING THEN TAKE 2 TABLETS BY MOUTH EVERY NIGHT AT BEDTIME 270 tablet 3    albuterol 108 (90 Base) MCG/ACT Inhalation Aero Soln Inhale 2 puffs into the lungs every 6 (six) hours as needed for Wheezing. 8.5 g 3    cyanocobalamine 1000 MCG Oral Tab Take 1 tablet (1,000 mcg total) by mouth daily. 90 tablet 1    ASPIRIN 81 OR Take by mouth.       Allergies:Allergies[1]    HISTORY:  Past Medical History:    Allergic rhinitis    Anxiety    Arthritis    High cholesterol    Hyperlipidemia    Tobacco abuse      Past Surgical History:   Procedure Laterality Date    Colonoscopy  07/2019    Colonoscopy N/A 07/25/2019    Procedure: COLONOSCOPY;  Surgeon: Deep Allred MD;  [facility] END[facility]COPY    Excis infratent brain tumor      Laminectomy  2001    Other surgical history        Family History   Problem Relation Age of Onset    Heart Disease Father     Heart Disease Mother     Lung Disorder Mother         emphysema       Social History:   Social History     Socioeconomic History    Marital status:    Tobacco Use    Smoking status: Every Day     Current packs/day: 1.00     Average packs/day: 1 pack/day for 40.0 years (40.0 ttl pk-yrs)     Types: Cigarettes     Passive exposure: Never    Smokeless tobacco: Current    Tobacco comments:     tried quitting 4/1/18; back to 1PPD   Vaping Use    Vaping status: Never Used   Substance and Sexual Activity    Alcohol use: No    Drug use: No   Other Topics Concern    Caffeine Concern Yes     Comment: Coffee     Social Drivers of Health      [facility], [facility]    Social Connections    Received from [facility], [facility]    Housing Stability        PHYSICAL EXAM:   /72 (BP Location: Left arm, Patient Position: Sitting, Cuff Size: adult)   Pulse 77   Temp 97.9 °F (36.6 °C) (Oral)   Resp 17   Ht 5' 8\" (1.727 m)   Wt 144 lb (65.3 kg)   SpO2 99%   BMI 21.90 kg/m²   BP Readings from Last 3 Encounters:   12/10/24 138/72   07/02/24 116/65   01/31/24 133/69     Wt Readings from Last 3 Encounters:   12/10/24 144 lb (65.3 kg)   07/02/24 152 lb (68.9 kg)   01/31/24 154 lb (69.9 kg)       Physical Exam  Vitals and nursing note reviewed.   Constitutional:       General: He is not in acute distress.     Appearance: He is well-developed and well-groomed. He is not ill-appearing, toxic-appearing or diaphoretic.      Interventions: He is not intubated.  Eyes:      General: No scleral icterus.  Neck:      Thyroid: No thyromegaly.      Vascular: No JVD.      Trachea: No tracheal deviation.   Cardiovascular:      Rate and Rhythm: Normal rate and regular rhythm.      Pulses: Normal pulses.           Carotid pulses are 2+ on the right side and 2+ on the left side.       Radial pulses are 2+ on the right side and 2+ on the left side.        Dorsalis pedis pulses are 2+ on the right side and 2+ on the  left side.        Posterior tibial pulses are 2+ on the right side and 2+ on the left side.      Heart sounds: Normal heart sounds, S1 normal and S2 normal.   Pulmonary:      Effort: Pulmonary effort is normal. No tachypnea, bradypnea, accessory muscle usage, prolonged expiration, respiratory distress or retractions. He is not intubated.      Breath sounds: Normal breath sounds. No stridor, decreased air movement or transmitted upper airway sounds. No decreased breath sounds, wheezing, rhonchi or rales.   Chest:      Chest wall: No tenderness.   Abdominal:      General: Bowel sounds are normal. There is no distension.      Palpations: Abdomen is soft. There is no hepatomegaly or splenomegaly.      Tenderness: There is no abdominal tenderness. There is no right CVA tenderness, left CVA tenderness, guarding or rebound.   Musculoskeletal:      Right lower leg: No edema.      Left lower leg: No edema.   Skin:     General: Skin is warm and dry.   Neurological:      Mental Status: He is alert and oriented to person, place, and time.   Psychiatric:         Behavior: Behavior normal. Behavior is cooperative.              ASSESSMENT/PLAN:     Encounter Diagnoses   Name Primary?    High cholesterol Check blood.    Yes    Primary hypertension Higher. Decrease amlodipine to 5 mg a day. Was not taking > 1 month. Call in 2 weeks with Bp's. Careful with diet and excercise at least 30 minutes 3-4 times a week. Check blood pressures at different times on different days. Can purchase own blood pressure monitor. If not, check at local pharmacy. Bake foods more and grill occasionally. Avoid fried foods. No salt. Use other seasonings.         Hyperglycemia Check blood.        Tobacco abuse DW pt. of quitting. Set date for 1-1-25.        Vaccine counseling Holding all vaccines including pneumonia 20 influenza vaccine Tdap and RSV vaccines.       Thinning of skin STOP zinc and ASA. Hydrate at least 48 oz. water a day. Cut back on 7 up and  caffeine.         Orders Placed This Encounter   Procedures    Tobacco Use Counseling 3-10 Min [05351]       Meds This Visit:  Requested Prescriptions     Signed Prescriptions Disp Refills    buPROPion  MG Oral Tablet 12 Hr 180 tablet 1     Sig: Take 1 tablet (150 mg total) by mouth 2 (two) times daily.    cyclobenzaprine 10 MG Oral Tab 90 tablet 0     Sig: Take 1 tablet (10 mg total) by mouth nightly.    hydroCHLOROthiazide 12.5 MG Oral Cap 90 capsule 0     Sig: Take 1 capsule (12.5 mg total) by mouth daily.    naproxen 500 MG Oral Tab 180 tablet 1     Sig: Take 1 tablet (500 mg total) by mouth 2 (two) times daily with meals.    amLODIPine 5 MG Oral Tab 30 tablet 3     Sig: Take 1 tablet (5 mg total) by mouth daily.    Betamethasone Dipropionate Aug 0.05 % External Cream 50 g 2     Sig: Apply 30 g topically 2 (two) times daily.       Imaging & Referrals:  CT LUNG LD SCREENING(CPT=71271)      RTC 3 months for HTN FU.   RTC 7-2-25 for physical.   Tobacco cessation counseling for 3-10 minutes (add E/M code #88717).         [1]   Allergies  Allergen Reactions    Morphine NAUSEA AND VOMITING

## 2024-12-18 RX ORDER — TADALAFIL 20 MG/1
20 TABLET ORAL
Qty: 20 TABLET | Refills: 2 | OUTPATIENT
Start: 2024-12-18

## 2025-01-03 ENCOUNTER — HOSPITAL ENCOUNTER (OUTPATIENT)
Dept: CT IMAGING | Facility: HOSPITAL | Age: 65
Discharge: HOME | End: 2025-01-03
Attending: INTERNAL MEDICINE
Payer: COMMERCIAL

## 2025-01-03 DIAGNOSIS — Z72.0 TOBACCO ABUSE: ICD-10-CM

## 2025-01-03 PROCEDURE — 71271 CT THORAX LUNG CANCER SCR C-: CPT | Performed by: INTERNAL MEDICINE

## 2025-01-14 RX ORDER — TADALAFIL 20 MG/1
20 TABLET ORAL
Qty: 20 TABLET | Refills: 2 | Status: SHIPPED | OUTPATIENT
Start: 2025-01-14

## 2025-01-14 NOTE — TELEPHONE ENCOUNTER
Refill Passed Per Protocol    Requested Prescriptions   Pending Prescriptions Disp Refills    Tadalafil 20 MG Oral Tab 20 tablet 2     Sig: Take 1 tablet (20 mg total) by mouth daily as needed for Erectile Dysfunction.       Genitourinary Medications Passed - 1/14/2025  3:27 PM        Passed - Patient does not have pulmonary hypertension on problem list        Passed - In person appointment or virtual visit in the past 12 mos or appointment in next 3 mos     Recent Outpatient Visits              1 month ago High cholesterol    [facility] Kath Morris MD    Office Visit    6 months ago High cholesterol    [facility] Kath Cruz MD    Office Visit    11 months ago Primary hypertension    [facility] ([place], [place])Felipa Arlinda, MD    Office Visit    1 year ago Primary hypertension    [facility] ([place] [place]Felipa Arlinda, MD    Office Visit    1 year ago Primary hypertension    [facility] ([place] [place]Felipa Arlinda, MD    Office Visit          Future Appointments         Provider Department Appt Notes    In 1 week Kath Morris MD [facility]                     Passed - Medication is active on med list             Future Appointments         Provider Department Appt Notes    In 1 week Kath Morris MD [facility]           Recent Out[facility] Visits              [facility]    [facility]    6 months ago High cholesterol    [facility] Kath Cruz MD    Office Visit    11 months ago Primary hypertension    [facility] ([place], [place]Felipa Arlinda, MD    Office Visit    1 year ago  Primary hypertension    [facility] [place] [facility], Lena Cao MD    Office Visit    1 year ago Primary hypertension    [facility] [place] [facility], [facility], Lena Cao MD    Office Visit

## 2025-01-21 ENCOUNTER — OFFICE VISIT (OUTPATIENT)
Dept: INTERNAL MEDICINE CLINIC | Facility: CLINIC | Age: 65
End: 2025-01-21

## 2025-01-21 VITALS
OXYGEN SATURATION: 100 % | HEIGHT: 68 IN | TEMPERATURE: 98 F | HEART RATE: 86 BPM | WEIGHT: 146 LBS | SYSTOLIC BLOOD PRESSURE: 128 MMHG | DIASTOLIC BLOOD PRESSURE: 69 MMHG | BODY MASS INDEX: 22.13 KG/M2

## 2025-01-21 DIAGNOSIS — Z71.85 VACCINE COUNSELING: ICD-10-CM

## 2025-01-21 DIAGNOSIS — R73.9 HYPERGLYCEMIA: ICD-10-CM

## 2025-01-21 DIAGNOSIS — N52.8 OTHER MALE ERECTILE DYSFUNCTION: ICD-10-CM

## 2025-01-21 DIAGNOSIS — I10 PRIMARY HYPERTENSION: ICD-10-CM

## 2025-01-21 DIAGNOSIS — F17.210 CIGARETTE SMOKER: ICD-10-CM

## 2025-01-21 DIAGNOSIS — E78.00 HIGH CHOLESTEROL: Primary | ICD-10-CM

## 2025-01-21 DIAGNOSIS — Z72.0 TOBACCO ABUSE: ICD-10-CM

## 2025-01-21 PROCEDURE — 90471 IMMUNIZATION ADMIN: CPT | Performed by: INTERNAL MEDICINE

## 2025-01-21 PROCEDURE — 99213 OFFICE O/P EST LOW 20 MIN: CPT | Performed by: INTERNAL MEDICINE

## 2025-01-21 PROCEDURE — 90677 PCV20 VACCINE IM: CPT | Performed by: INTERNAL MEDICINE

## 2025-01-21 PROCEDURE — 3074F SYST BP LT 130 MM HG: CPT | Performed by: INTERNAL MEDICINE

## 2025-01-21 PROCEDURE — 3008F BODY MASS INDEX DOCD: CPT | Performed by: INTERNAL MEDICINE

## 2025-01-21 PROCEDURE — 99406 BEHAV CHNG SMOKING 3-10 MIN: CPT | Performed by: INTERNAL MEDICINE

## 2025-01-21 PROCEDURE — 3078F DIAST BP <80 MM HG: CPT | Performed by: INTERNAL MEDICINE

## 2025-01-21 RX ORDER — AMLODIPINE BESYLATE 10 MG/1
10 TABLET ORAL DAILY
COMMUNITY
Start: 2024-12-11

## 2025-01-21 NOTE — PATIENT INSTRUCTIONS
ASSESSMENT/PLAN:     Encounter Diagnoses   Name Primary?    High cholesterol Check blood.   Yes    Primary hypertension Stable. Careful with diet and excercise at least 30 minutes 3-4 times a week. Check blood pressures at different times on different days. Can purchase own blood pressure monitor. If not, check at local pharmacy. Bake foods more and grill occasionally. Avoid fried foods. No salt. Use other seasonings.         Hyperglycemia Check blood. Lower carbs.        Tobacco abuse Dw pt. Of quitting. Cutting down since 1-1-25.        Vaccine counseling PCV 20 today. Holding Tdap and covid booster.               No orders of the defined types were placed in this encounter.      Meds This Visit:  Requested Prescriptions      No prescriptions requested or ordered in this encounter       Imaging & Referrals:  None      RTC after 7-2-25 for physical.     Quitting Smoking    Quitting smoking is the most important step you can take to improve your health. We're glad you have set a goal to improve your health.    Quit Smoking Resources    In addition to medications, use the STAR plan to help you successfully quit.   Stick with your quit date!   Tell friends, family, and coworkers your quit date. Request their understanding and support.  Anticipate and prepare for challenges. Some examples are withdrawal symptoms, being around others who smoke, and drinking alcohol.  Remove all tobacco products and paraphernalia from your environment. Make your home and vehicles smoke-free.    Free resources for additional support:  National tobacco quitline: 1-800-QUIT-NOW (1-300.917.8595).  SmokefreeTXT is a free text program to assist you in quitting. Visit https://www.smokefree.gov/smokefreetxt for more information.  Feel free to call your care manager at ([phone]) for additional support.    Getting Support for Quitting Smoking  You don’t have to go through the process of quitting smoking without support. Tell people you are  quitting. The support of friends, coworkers, and family members can make a big difference. Face-to-face or telephone counseling can also be helpful, as can a stop-smoking class or an ex-smokers’ group.     Set a quit date  If you’re serious about quitting smoking, choose a specific quit date within the next 2 to 4 weeks. Tito it in bright, bold letters on a calendar you use often. Tell people about your quit date. Ask for their support. Let your friends, coworkers, and family know how they can help you quit.   Make a contract  A quit-smoking contract gives you a goal. Write out the contract and sign it. Have it witnessed, if you like. Then keep the contract where you’ll see it often, or carry it with you. Read the contract when you’re tempted to smoke.   Take action  On the day you quit, reread your quit contract. Think about the benefits you gain by quitting, such as better health and an improved sense of taste, as well as the money you will save from not smoking. Also:   Remove cigarettes from your home, car, or any other place where you stash them.  Throw away all smoking materials, including matches, lighters, and ashtrays.  Review your list of triggers and your plan for coping with each of them.  Stay away from people or settings you link with smoking.  Make a quit kit that includes gum, mints, carrot sticks, and things to keep your hands and mouth busy.  Talk to your healthcare provider about using quit-smoking products, such as medicine or a nicotine patch, inhaler, nasal spray, gum, or lozenges.  Ask for help  Sometimes you may just need to talk when you miss smoking. Ex-smokers are good to talk to, because they’re likely to know how you feel. You may need extra support in the first few weeks after you quit. Ask a friend to call you each day to see how you’re doing. Telephone counseling can also help you keep on track. Ask your healthcare provider, local hospital, or public health department to put you in  touch with a phone counselor. You may also have to deal with doubters when you decide to quit. Explain to any doubters why you are quitting. Tell them that quitting is important to you. Ask for their support.   Tell your smoking buddies that you can walk together instead of smoking together. If someone thinks you won’t succeed, say that you have a good quit plan and ask for their support. Let them know you’re sticking with it. If they can't give you support, consider limiting contact with them for a while.   Stay positive, and if you slip, start again. Quitting smoking often requires repeated attempts. But smokers do quit for good. It's hard, but with determination and support, you can do it.   To learn more  Get more tips from these resources:  CDC at www.cdc.gov/tobacco/quit_smoking  or 800-QUIT-NOW (124-668-5975)  National [employer] at www.smokefree.gov  or 877-44U-QUIT (565-098-9262)  American Lung Association at www.lung.org/stop-smoking  or 800-LUNGUSA (566-668-1698)  Isidro last reviewed this educational content on 1/1/2022  © 1980-0750 [employer]. All rights reserved. This information is not intended as a substitute for professional medical care. Always follow your healthcare professional's instructions.

## 2025-01-21 NOTE — PROGRESS NOTES
HPI:    Patient ID: Nick Velazquez is a 64 year old male.    Hypertension  Patient is here for follow up of hypertension. BP at home: 130/80's. Different times   Has been compliant with medications.  Exercise level: somewhat active and has been following low salt diet.  Weight has been stable.  Wt Readings from Last 3 Encounters:   01/21/25 146 lb (66.2 kg)   12/10/24 144 lb (65.3 kg)   07/02/24 152 lb (68.9 kg)     BP Readings from Last 3 Encounters:   01/21/25 128/69   12/10/24 138/72   07/02/24 116/65     Labs:   Lab Results   Component Value Date/Time     (H) 01/21/2023 08:36 AM     01/21/2023 08:36 AM    K 4.3 01/21/2023 08:36 AM     01/21/2023 08:36 AM    CO2 27.0 01/21/2023 08:36 AM    CREATSERUM 0.86 01/21/2023 08:36 AM    CA 8.7 01/21/2023 08:36 AM    AST 15 01/21/2023 08:36 AM    ALT 22 01/21/2023 08:36 AM    TSH 0.804 01/21/2023 08:36 AM    T4F 0.87 04/01/2018 09:45 AM        Lab Results   Component Value Date/Time    CHOLEST 118 01/21/2023 08:36 AM    HDL 37 (L) 01/21/2023 08:36 AM    TRIG 95 01/21/2023 08:36 AM    LDL 63 01/21/2023 08:36 AM    NONHDLC 81 01/21/2023 08:36 AM            Wt Readings from Last 3 Encounters:   01/21/25 146 lb (66.2 kg)   12/10/24 144 lb (65.3 kg)   07/02/24 152 lb (68.9 kg)     BP Readings from Last 3 Encounters:   01/21/25 128/69   12/10/24 138/72   07/02/24 116/65     Labs:   Lab Results   Component Value Date/Time     (H) 01/21/2023 08:36 AM     01/21/2023 08:36 AM    K 4.3 01/21/2023 08:36 AM     01/21/2023 08:36 AM    CO2 27.0 01/21/2023 08:36 AM    CREATSERUM 0.86 01/21/2023 08:36 AM    CA 8.7 01/21/2023 08:36 AM    AST 15 01/21/2023 08:36 AM    ALT 22 01/21/2023 08:36 AM    TSH 0.804 01/21/2023 08:36 AM    T4F 0.87 04/01/2018 09:45 AM        Lab Results   Component Value Date/Time    CHOLEST 118 01/21/2023 08:36 AM    HDL 37 (L) 01/21/2023 08:36 AM    TRIG 95 01/21/2023 08:36 AM    LDL 63 01/21/2023 08:36 AM    NONHDLC 81  01/21/2023 08:36 AM          HPI      Review of Systems   Constitutional: Negative.  Negative for activity change, appetite change, chills, diaphoresis, fatigue, fever and unexpected weight change.   HENT:  Negative for congestion, mouth sores, postnasal drip, rhinorrhea, sinus pressure, sneezing, sore throat, trouble swallowing and voice change.    Eyes:  Negative for pain.   Respiratory:  Negative for apnea, cough, choking, chest tightness, shortness of breath, wheezing and stridor.    Cardiovascular:  Negative for chest pain, palpitations and leg swelling.   Gastrointestinal:  Negative for diarrhea.   Endocrine: Negative for cold intolerance, heat intolerance, polydipsia, polyphagia and polyuria.   Musculoskeletal:  Negative for neck pain.   Neurological:  Negative for dizziness, tremors, seizures, syncope, facial asymmetry, speech difficulty, weakness, light-headedness, numbness and headaches.   Psychiatric/Behavioral:  The patient is not nervous/anxious.    All other systems reviewed and are negative.        Current Outpatient Medications   Medication Sig Dispense Refill    amLODIPine 10 MG Oral Tab Take 1 tablet (10 mg total) by mouth daily.      Tadalafil 20 MG Oral Tab Take 1 tablet (20 mg total) by mouth daily as needed for Erectile Dysfunction. 20 tablet 2    buPROPion  MG Oral Tablet 12 Hr Take 1 tablet (150 mg total) by mouth 2 (two) times daily. 180 tablet 1    cyclobenzaprine 10 MG Oral Tab Take 1 tablet (10 mg total) by mouth nightly. 90 tablet 0    hydroCHLOROthiazide 12.5 MG Oral Cap Take 1 capsule (12.5 mg total) by mouth daily. 90 capsule 0    naproxen 500 MG Oral Tab Take 1 tablet (500 mg total) by mouth 2 (two) times daily with meals. 180 tablet 1    Cholecalciferol (VITAMIN D3) 25 MCG (1000 UT) Oral Cap Take 1 tablet by mouth daily.      zinc sulfate 220 (50 Zn) MG Oral Cap Take 1 capsule (220 mg total) by mouth daily.      amLODIPine 5 MG Oral Tab Take 1 tablet (5 mg total) by mouth  daily. 30 tablet 3    Betamethasone Dipropionate Aug 0.05 % External Cream Apply 30 g topically 2 (two) times daily. 50 g 2    atorvastatin 40 MG Oral Tab TAKE 1 TABLET BY MOUTH EVERY NIGHT 90 tablet 3    gabapentin 600 MG Oral Tab TAKE 1 TABLET BY MOUTH EVERY MORNING THEN TAKE 2 TABLETS BY MOUTH EVERY NIGHT AT BEDTIME 270 tablet 3    albuterol 108 (90 Base) MCG/ACT Inhalation Aero Soln Inhale 2 puffs into the lungs every 6 (six) hours as needed for Wheezing. 8.5 g 3    cyanocobalamine 1000 MCG Oral Tab Take 1 tablet (1,000 mcg total) by mouth daily. 90 tablet 1    ASPIRIN 81 OR Take by mouth.       Allergies:Allergies[1]    HISTORY:  Past Medical History:    Allergic rhinitis    Anxiety    Arthritis    High cholesterol    Hyperlipidemia    Tobacco abuse      Past Surgical History:   Procedure Laterality Date    Colonoscopy  07/2019    Colonoscopy N/A 07/25/2019    Procedure: COLONOSCOPY;  Surgeon: Deep Allred MD;  Location: [facility] ENDOSCOPY    Excis infratent brain tumor      Laminectomy  2001    Other surgical history        Family History   Problem Relation Age of Onset    Heart Disease Father     Heart Disease Mother     Lung Disorder Mother         emphysema      Social History:   Social History     Socioeconomic History    Marital status:    Tobacco Use    Smoking status: Every Day     Current packs/day: 1.00     Average packs/day: 1 pack/day for 40.0 years (40.0 ttl pk-yrs)     Types: Cigarettes     Passive exposure: Never    Smokeless tobacco: Current    Tobacco comments:     tried quitting 4/1/18; back to 1PPD   Vaping Use    Vaping status: Never Used   Substance and Sexual Activity    Alcohol use: No    Drug use: No   Other Topics Concern    Caffeine Concern Yes     Comment: Coffee     Social Drivers of Health      [facility]    Social Connections    [facility], [facility]     Housing Stability        PHYSICAL EXAM:   /69 (BP Location: Right arm, Patient Position: Sitting, Cuff Size: adult)   Pulse 86   Temp 98.1 °F (36.7 °C) (Oral)   Ht 5' 8\" (1.727 m)   Wt 146 lb (66.2 kg)   SpO2 100%   BMI 22.20 kg/m²   BP Readings from Last 3 Encounters:   01/21/25 128/69   12/10/24 138/72   07/02/24 116/65     Wt Readings from Last 3 Encounters:   01/21/25 146 lb (66.2 kg)   12/10/24 144 lb (65.3 kg)   07/02/24 152 lb (68.9 kg)       Physical Exam  Vitals and nursing note reviewed.   Constitutional:       General: He is not in acute distress.     Appearance: He is well-developed and well-groomed. He is not ill-appearing, toxic-appearing or diaphoretic.      Interventions: He is not intubated.  Neck:      Thyroid: No thyromegaly.      Vascular: No JVD.      Trachea: No tracheal deviation.   Cardiovascular:      Rate and Rhythm: Normal rate and regular rhythm.      Pulses: Normal pulses.           Carotid pulses are 2+ on the right side and 2+ on the left side.       Radial pulses are 2+ on the right side and 2+ on the left side.        Dorsalis pedis pulses are 2+ on the right side and 2+ on the left side.        Posterior tibial pulses are 2+ on the right side and 2+ on the left side.      Heart sounds: Normal heart sounds, S1 normal and S2 normal.   Pulmonary:      Effort: Pulmonary effort is normal. No tachypnea, bradypnea, accessory muscle usage, prolonged expiration, respiratory distress or retractions. He is not intubated.      Breath sounds: Normal breath sounds. No stridor, decreased air movement or transmitted upper airway sounds. No decreased breath sounds, wheezing, rhonchi or rales.   Chest:      Chest wall: No tenderness.   Abdominal:      General: Bowel sounds are normal.      Palpations: Abdomen is soft.      Tenderness: There is no abdominal tenderness.   Musculoskeletal:      Right lower leg: No edema.      Left lower leg: No edema.   Skin:     General: Skin is warm and  dry.   Neurological:      Mental Status: He is alert and oriented to person, place, and time.   Psychiatric:         Behavior: Behavior normal. Behavior is cooperative.              ASSESSMENT/PLAN:     Encounter Diagnoses   Name Primary?    High cholesterol Check blood.   Yes    Primary hypertension Stable. Careful with diet and excercise at least 30 minutes 3-4 times a week. Check blood pressures at different times on different days. Can purchase own blood pressure monitor. If not, check at local pharmacy. Bake foods more and grill occasionally. Avoid fried foods. No salt. Use other seasonings.         Hyperglycemia Check blood. Lower carbs.        Tobacco abuse Dw pt. Of quitting. Cutting down since 1-1-25.        Vaccine counseling PCV 20 today. Holding Tdap and covid booster.               No orders of the defined types were placed in this encounter.      Meds This Visit:  Requested Prescriptions      No prescriptions requested or ordered in this encounter       Imaging & Referrals:  None      RTC after 7-2-25 for physical.      Tobacco cessation counseling for <3 minutes.         [1]   Allergies  Allergen Reactions    Morphine NAUSEA AND VOMITING

## 2025-01-22 ENCOUNTER — MED REC SCAN ONLY (OUTPATIENT)
Dept: INTERNAL MEDICINE CLINIC | Facility: CLINIC | Age: 65
End: 2025-01-22

## 2025-02-15 RX ORDER — TADALAFIL 20 MG/1
20 TABLET ORAL
Qty: 20 TABLET | Refills: 2 | OUTPATIENT
Start: 2025-02-15

## 2025-02-21 ENCOUNTER — E-VISIT (OUTPATIENT)
Dept: TELEHEALTH | Age: 65
End: 2025-02-21
Payer: COMMERCIAL

## 2025-02-21 DIAGNOSIS — R05.1 ACUTE COUGH: Primary | ICD-10-CM

## 2025-02-22 NOTE — PROGRESS NOTES
HPI:  Nick Velazquez is a 64 year old male who presents for an evisit.  See [email or web address] above.    Current Outpatient Medications   Medication Sig Dispense Refill    amLODIPine 10 MG Oral Tab Take 1 tablet (10 mg total) by mouth daily.      Tadalafil 20 MG Oral Tab Take 1 tablet (20 mg total) by mouth daily as needed for Erectile Dysfunction. 20 tablet 2    buPROPion  MG Oral Tablet 12 Hr Take 1 tablet (150 mg total) by mouth 2 (two) times daily. 180 tablet 1    cyclobenzaprine 10 MG Oral Tab Take 1 tablet (10 mg total) by mouth nightly. 90 tablet 0    hydroCHLOROthiazide 12.5 MG Oral Cap Take 1 capsule (12.5 mg total) by mouth daily. 90 capsule 0    naproxen 500 MG Oral Tab Take 1 tablet (500 mg total) by mouth 2 (two) times daily with meals. 180 tablet 1    Cholecalciferol (VITAMIN D3) 25 MCG (1000 UT) Oral Cap Take 1 tablet by mouth daily.      zinc sulfate 220 (50 Zn) MG Oral Cap Take 1 capsule (220 mg total) by mouth daily.      amLODIPine 5 MG Oral Tab Take 1 tablet (5 mg total) by mouth daily. 30 tablet 3    Betamethasone Dipropionate Aug 0.05 % External Cream Apply 30 g topically 2 (two) times daily. 50 g 2    atorvastatin 40 MG Oral Tab TAKE 1 TABLET BY MOUTH EVERY NIGHT 90 tablet 3    gabapentin 600 MG Oral Tab TAKE 1 TABLET BY MOUTH EVERY MORNING THEN TAKE 2 TABLETS BY MOUTH EVERY NIGHT AT BEDTIME 270 tablet 3    albuterol 108 (90 Base) MCG/ACT Inhalation Aero Soln Inhale 2 puffs into the lungs every 6 (six) hours as needed for Wheezing. 8.5 g 3    cyanocobalamine 1000 MCG Oral Tab Take 1 tablet (1,000 mcg total) by mouth daily. 90 tablet 1    ASPIRIN 81 OR Take by mouth.       Past Medical History:    Allergic rhinitis    Anxiety    Arthritis    High cholesterol    Hyperlipidemia    Tobacco abuse     Past Surgical History:   Procedure Laterality Date    Colonoscopy  07/2019    Colonoscopy N/A 07/25/2019    Procedure: COLONOSCOPY;  Surgeon: Deep Allred MD;  Location: [facility]  ENDOSCOPY    Excis infratent brain tumor      Laminectomy  2001    Other surgical history         Social History     Socioeconomic History    Marital status:    Tobacco Use    Smoking status: Every Day     Current packs/day: 1.00     Average packs/day: 1 pack/day for 40.0 years (40.0 ttl pk-yrs)     Types: Cigarettes     Passive exposure: Never    Smokeless tobacco: Current    Tobacco comments:     tried quitting 4/1/18; back to 1PPD   Vaping Use    Vaping status: Never Used   Substance and Sexual Activity    Alcohol use: No    Drug use: No   Other Topics Concern    Caffeine Concern Yes     Comment: Coffee     Social Drivers of Health      Received from [facility], [facility]    Housing Stability          No results found for this or any previous visit (from the past 24 hours).    ASSESSMENT AND PLAN:      ASSESSMENT:   Encounter Diagnosis   Name Primary?    Acute cough Yes       PLAN: Meds as below.  See patient Instructions  -Total of 6 minutes spent with patient.  Patient with cough, reporting \"its mainly in my chest.\"  IC advised for in-person evaluation.     Meds & Refills for this Visit:  Requested Prescriptions      No prescriptions requested or ordered in this encounter       Risks, benefits, and side effects of medication explained and discussed.    There are no Patient Instructions on file for this visit.    The patient indicates understanding of these issues and agrees to the plan.  See attached patient references.  The patient is asked to return if sx's persist or worsen.    Nick Velazquez understands evisit evaluation is not a substitute for face-to-face examination or emergency care. Patient advised to go to ER or call 911 for worsening symptoms or acute distress.

## 2025-03-10 RX ORDER — CYCLOBENZAPRINE HCL 10 MG
10 TABLET ORAL NIGHTLY
Qty: 90 TABLET | Refills: 0 | Status: SHIPPED | OUTPATIENT
Start: 2025-03-10

## 2025-03-11 RX ORDER — HYDROCHLOROTHIAZIDE 12.5 MG/1
12.5 CAPSULE ORAL DAILY
Qty: 90 CAPSULE | Refills: 0 | Status: SHIPPED | OUTPATIENT
Start: 2025-03-11

## 2025-04-11 RX ORDER — TADALAFIL 20 MG/1
20 TABLET ORAL
Qty: 20 TABLET | Refills: 2 | Status: SHIPPED | OUTPATIENT
Start: 2025-04-11

## 2025-04-11 NOTE — TELEPHONE ENCOUNTER
Refill passes per [facility] protocol.    Future Appointments   Date Time Provider Department Center   7/22/2025  5:00 PM Kath Morris MD [id number]  [place] [address]

## 2025-05-27 RX ORDER — TADALAFIL 20 MG/1
20 TABLET ORAL
Qty: 20 TABLET | Refills: 2 | Status: CANCELLED | OUTPATIENT
Start: 2025-05-27

## 2025-06-12 RX ORDER — HYDROCHLOROTHIAZIDE 12.5 MG/1
12.5 CAPSULE ORAL DAILY
Qty: 90 CAPSULE | Refills: 1 | Status: SHIPPED | OUTPATIENT
Start: 2025-06-12

## 2025-06-12 NOTE — TELEPHONE ENCOUNTER
Please review; protocol failed/ has no protocol    Message sent for patient to have labs done.

## 2025-06-25 RX ORDER — CYCLOBENZAPRINE HCL 10 MG
10 TABLET ORAL NIGHTLY
Qty: 90 TABLET | Refills: 0 | Status: SHIPPED | OUTPATIENT
Start: 2025-06-25

## 2025-06-25 NOTE — TELEPHONE ENCOUNTER
Please review. Protocol Failed; No Protocol         Pt with shallow, rapid breathing.  Pt's mother states minimal appetite but has been able eat popsicicles.  O2 remains in place.

## 2025-06-28 RX ORDER — BUPROPION HYDROCHLORIDE 150 MG/1
150 TABLET, EXTENDED RELEASE ORAL 2 TIMES DAILY
Qty: 180 TABLET | Refills: 1 | Status: SHIPPED | OUTPATIENT
Start: 2025-06-28

## 2025-06-28 NOTE — TELEPHONE ENCOUNTER
Refill passed per [facility] protocol.    Requested Prescriptions   Pending Prescriptions Disp Refills    buPROPion  MG Oral Tablet 12 Hr 180 tablet 1     Sig: Take 1 tablet (150 mg total) by mouth 2 (two) times daily.       Psychiatric Non-Scheduled (Anti-Anxiety) Passed - 6/28/2025  9:37 AM        Passed - In person appointment or virtual visit in the past 6 mos or appointment in next 3 mos     Recent Outpatient Visits              4 months ago Acute cough    [facility], Virtual Visit Paige Zepeda PA    E-Visit    5 months ago High cholesterol    [facility] Kath Cruz MD    [facility]    [facility]    [facility]    12 months ago High cholesterol    [facility] Kath Cruz MD    Office Visit    1 year ago Primary hypertension    [facility]    Office Visit          Future Appointments         Provider Department Appt Notes    In 3 weeks Kath Morris MD [facility] ([place], [place])                     Passed - Depression Screening completed within the past 12 months        Passed - Medication is active on med list           Future Appointments         Provider Department Appt Notes    [facility] weeks [facility]           Recent Outpatient Visits              4 months ago Acute cough    [facility], Virtual Visit Paige Zepeda PA    E-Visit    5 months [facility]    [facility]    6 [facility]    [facility]    12 months ago  High cholesterol    [facility], Kath Cruz MD    Office Visit    1 year ago Primary hypertension    [facility], Lena Cao MD    Office Visit

## 2025-07-22 ENCOUNTER — OFFICE VISIT (OUTPATIENT)
Dept: INTERNAL MEDICINE CLINIC | Facility: CLINIC | Age: 65
End: 2025-07-22

## 2025-07-22 VITALS
SYSTOLIC BLOOD PRESSURE: 108 MMHG | OXYGEN SATURATION: 100 % | HEART RATE: 85 BPM | HEIGHT: 68 IN | WEIGHT: 153.19 LBS | TEMPERATURE: 98 F | DIASTOLIC BLOOD PRESSURE: 73 MMHG | BODY MASS INDEX: 23.22 KG/M2

## 2025-07-22 DIAGNOSIS — Z00.00 ADULT GENERAL MEDICAL EXAM: ICD-10-CM

## 2025-07-22 DIAGNOSIS — R73.9 HYPERGLYCEMIA: ICD-10-CM

## 2025-07-22 DIAGNOSIS — E78.00 HIGH CHOLESTEROL: Primary | ICD-10-CM

## 2025-07-22 DIAGNOSIS — M79.642 HAND PAIN, LEFT: ICD-10-CM

## 2025-07-22 DIAGNOSIS — M54.12 CERVICAL RADICULOPATHY: ICD-10-CM

## 2025-07-22 DIAGNOSIS — Z12.5 PROSTATE CANCER SCREENING: ICD-10-CM

## 2025-07-22 DIAGNOSIS — M48.02 CERVICAL SPINAL STENOSIS: ICD-10-CM

## 2025-07-22 DIAGNOSIS — Z71.85 VACCINE COUNSELING: ICD-10-CM

## 2025-07-22 DIAGNOSIS — I10 PRIMARY HYPERTENSION: ICD-10-CM

## 2025-07-22 DIAGNOSIS — Z72.0 TOBACCO ABUSE: ICD-10-CM

## 2025-07-22 DIAGNOSIS — I10 ESSENTIAL HYPERTENSION: ICD-10-CM

## 2025-07-22 LAB
ALBUMIN SERPL-MCNC: 4.9 G/DL (ref 3.2–4.8)
ALBUMIN/GLOB SERPL: 2.5 {RATIO} (ref 1–2)
ALP LIVER SERPL-CCNC: 74 U/L (ref 45–117)
ALT SERPL-CCNC: 28 U/L (ref 10–49)
ANION GAP SERPL CALC-SCNC: 7 MMOL/L (ref 0–18)
AST SERPL-CCNC: 22 U/L (ref ?–34)
BASOPHILS # BLD AUTO: 0.05 X10(3) UL (ref 0–0.2)
BASOPHILS NFR BLD AUTO: 0.6 %
BILIRUB SERPL-MCNC: 0.8 MG/DL (ref 0.2–1.1)
BILIRUBIN: NEGATIVE
BUN BLD-MCNC: 13 MG/DL (ref 9–23)
BUN/CREAT SERPL: 12.7 (ref 10–20)
CALCIUM BLD-MCNC: 9.3 MG/DL (ref 8.7–10.4)
CHLORIDE SERPL-SCNC: 105 MMOL/L (ref 98–112)
CHOLEST SERPL-MCNC: 149 MG/DL (ref ?–200)
CO2 SERPL-SCNC: 26 MMOL/L (ref 21–32)
COMPLEXED PSA SERPL-MCNC: 0.4 NG/ML (ref ?–4)
CREAT BLD-MCNC: 1.02 MG/DL (ref 0.7–1.3)
DEPRECATED RDW RBC AUTO: 40.3 FL (ref 35.1–46.3)
EGFRCR SERPLBLD CKD-EPI 2021: 82 ML/MIN/1.73M2 (ref 60–?)
EOSINOPHIL # BLD AUTO: 0.13 X10(3) UL (ref 0–0.7)
EOSINOPHIL NFR BLD AUTO: 1.5 %
ERYTHROCYTE [DISTWIDTH] IN BLOOD BY AUTOMATED COUNT: 12.2 % (ref 11–15)
ERYTHROCYTE [SEDIMENTATION RATE] IN BLOOD: 8 MM/HR (ref 0–20)
EST. AVERAGE GLUCOSE BLD GHB EST-MCNC: 120 MG/DL (ref 68–126)
FASTING PATIENT LIPID ANSWER: YES
FASTING STATUS PATIENT QL REPORTED: YES
GLOBULIN PLAS-MCNC: 2 G/DL (ref 2–3.5)
GLUCOSE (URINE DIPSTICK): NEGATIVE MG/DL
GLUCOSE BLD-MCNC: 87 MG/DL (ref 70–99)
HBA1C MFR BLD: 5.8 % (ref ?–5.7)
HCT VFR BLD AUTO: 42.8 % (ref 39–53)
HDLC SERPL-MCNC: 42 MG/DL (ref 40–59)
HGB BLD-MCNC: 15.1 G/DL (ref 13–17.5)
IMM GRANULOCYTES # BLD AUTO: 0.01 X10(3) UL (ref 0–1)
IMM GRANULOCYTES NFR BLD: 0.1 %
KETONES (URINE DIPSTICK): NEGATIVE MG/DL
LDLC SERPL CALC-MCNC: 78 MG/DL (ref ?–100)
LEUKOCYTES: NEGATIVE
LYMPHOCYTES # BLD AUTO: 2.4 X10(3) UL (ref 1–4)
LYMPHOCYTES NFR BLD AUTO: 27.2 %
MCH RBC QN AUTO: 32 PG (ref 26–34)
MCHC RBC AUTO-ENTMCNC: 35.3 G/DL (ref 31–37)
MCV RBC AUTO: 90.7 FL (ref 80–100)
MONOCYTES # BLD AUTO: 0.9 X10(3) UL (ref 0.1–1)
MONOCYTES NFR BLD AUTO: 10.2 %
MULTISTIX LOT#: ABNORMAL NUMERIC
NEUTROPHILS # BLD AUTO: 5.33 X10 (3) UL (ref 1.5–7.7)
NEUTROPHILS # BLD AUTO: 5.33 X10(3) UL (ref 1.5–7.7)
NEUTROPHILS NFR BLD AUTO: 60.4 %
NITRITE, URINE: NEGATIVE
NONHDLC SERPL-MCNC: 107 MG/DL (ref ?–130)
OCCULT BLOOD: NEGATIVE
OSMOLALITY SERPL CALC.SUM OF ELEC: 285 MOSM/KG (ref 275–295)
PH, URINE: 7 (ref 4.5–8)
PLATELET # BLD AUTO: 198 10(3)UL (ref 150–450)
POTASSIUM SERPL-SCNC: 3.7 MMOL/L (ref 3.5–5.1)
PROT SERPL-MCNC: 6.9 G/DL (ref 5.7–8.2)
PROTEIN (URINE DIPSTICK): 30 MG/DL
RBC # BLD AUTO: 4.72 X10(6)UL (ref 3.8–5.8)
RHEUMATOID FACT SERPL-ACNC: 10.5 IU/ML (ref ?–14)
SODIUM SERPL-SCNC: 138 MMOL/L (ref 136–145)
SPECIFIC GRAVITY: 1.02 (ref 1–1.03)
TRIGL SERPL-MCNC: 170 MG/DL (ref 30–149)
TSI SER-ACNC: 1.16 UIU/ML (ref 0.55–4.78)
URINE-COLOR: YELLOW
UROBILINOGEN,SEMI-QN: 0.2 MG/DL (ref 0–1.9)
VLDLC SERPL CALC-MCNC: 27 MG/DL (ref 0–30)
WBC # BLD AUTO: 8.8 X10(3) UL (ref 4–11)

## 2025-07-22 PROCEDURE — 99406 BEHAV CHNG SMOKING 3-10 MIN: CPT | Performed by: INTERNAL MEDICINE

## 2025-07-22 PROCEDURE — 36415 COLL VENOUS BLD VENIPUNCTURE: CPT | Performed by: INTERNAL MEDICINE

## 2025-07-22 PROCEDURE — 3074F SYST BP LT 130 MM HG: CPT | Performed by: INTERNAL MEDICINE

## 2025-07-22 PROCEDURE — 3008F BODY MASS INDEX DOCD: CPT | Performed by: INTERNAL MEDICINE

## 2025-07-22 PROCEDURE — 99214 OFFICE O/P EST MOD 30 MIN: CPT | Performed by: INTERNAL MEDICINE

## 2025-07-22 PROCEDURE — 81003 URINALYSIS AUTO W/O SCOPE: CPT | Performed by: INTERNAL MEDICINE

## 2025-07-22 PROCEDURE — 3078F DIAST BP <80 MM HG: CPT | Performed by: INTERNAL MEDICINE

## 2025-07-22 PROCEDURE — 99397 PER PM REEVAL EST PAT 65+ YR: CPT | Performed by: INTERNAL MEDICINE

## 2025-07-22 RX ORDER — PREGABALIN 100 MG/1
200 CAPSULE ORAL EVERY EVENING
Qty: 60 CAPSULE | Refills: 1 | Status: SHIPPED | OUTPATIENT
Start: 2025-07-22

## 2025-07-22 RX ORDER — CYCLOBENZAPRINE HCL 10 MG
10 TABLET ORAL NIGHTLY
Qty: 90 TABLET | Refills: 0 | Status: SHIPPED | OUTPATIENT
Start: 2025-07-22

## 2025-07-22 RX ORDER — CELECOXIB 100 MG/1
100 CAPSULE ORAL 2 TIMES DAILY
Qty: 60 CAPSULE | Refills: 1 | Status: SHIPPED | OUTPATIENT
Start: 2025-07-22

## 2025-07-22 NOTE — PROGRESS NOTES
HPI:    Patient ID: Nick Velazquez is a 65 year old male.    Nick Velazquez is a 65 year old male who presents for a complete physical exam.   HPI:     Chief Complaint   Patient presents with    Physical     Patient states he is here for an Annual Physical Examination.         No LMP for male patient.    /73 (BP Location: Right arm, Patient Position: Sitting, Cuff Size: large)   Pulse 85   Temp 98.2 °F (36.8 °C) (Temporal)   Ht 5' 8\" (1.727 m)   Wt 153 lb 3.2 oz (69.5 kg)   SpO2 100%   BMI 23.29 kg/m²    Wt Readings from Last 4 Encounters:   07/22/25 153 lb 3.2 oz (69.5 kg)   01/21/25 146 lb (66.2 kg)   12/10/24 144 lb (65.3 kg)   07/02/24 152 lb (68.9 kg)     Body mass index is 23.29 kg/m².     Labs:   Lab Results   Component Value Date/Time    WBC 8.8 07/22/2025 06:28 PM    HGB 15.1 07/22/2025 06:28 PM    .0 07/22/2025 06:28 PM      Lab Results   Component Value Date/Time    GLU 87 07/22/2025 06:28 PM     07/22/2025 06:28 PM    K 3.7 07/22/2025 06:28 PM     07/22/2025 06:28 PM    CO2 26.0 07/22/2025 06:28 PM    CREATSERUM 1.02 07/22/2025 06:28 PM    CA 9.3 07/22/2025 06:28 PM    ALB 4.9 (H) 07/22/2025 06:28 PM    TP 6.9 07/22/2025 06:28 PM    ALKPHO 74 07/22/2025 06:28 PM    AST 22 07/22/2025 06:28 PM    ALT 28 07/22/2025 06:28 PM    BILT 0.8 07/22/2025 06:28 PM    TSH 1.161 07/22/2025 06:28 PM    T4F 0.87 04/01/2018 09:45 AM        Lab Results   Component Value Date/Time    CHOLEST 149 07/22/2025 06:28 PM    HDL 42 07/22/2025 06:28 PM    TRIG 170 (H) 07/22/2025 06:28 PM    LDL 78 07/22/2025 06:28 PM    NONHDLC 107 07/22/2025 06:28 PM       Lab Results   Component Value Date/Time    A1C 5.8 (H) 07/22/2025 06:28 PM      No results found for: \"VITD\"      No recommendations at this time    Current Outpatient Medications   Medication Sig Dispense Refill    cyclobenzaprine 10 MG Oral Tab Take 1 tablet (10 mg total) by mouth nightly. 90 tablet 0    pregabalin (LYRICA) 100 MG  Oral Cap Take 2 capsules (200 mg total) by mouth every evening. 60 capsule 1    celecoxib (CELEBREX) 100 MG Oral Cap Take 1 capsule (100 mg total) by mouth 2 (two) times daily. 60 capsule 1    buPROPion  MG Oral Tablet 12 Hr Take 1 tablet (150 mg total) by mouth 2 (two) times daily. 180 tablet 1    hydroCHLOROthiazide 12.5 MG Oral Cap Take 1 capsule (12.5 mg total) by mouth daily. 90 capsule 1    amLODIPine 10 MG Oral Tab Take 1 tablet (10 mg total) by mouth daily.      naproxen 500 MG Oral Tab Take 1 tablet (500 mg total) by mouth 2 (two) times daily with meals. 180 tablet 1    Cholecalciferol (VITAMIN D3) 25 MCG (1000 UT) Oral Cap Take 1 tablet by mouth daily.      zinc sulfate 220 (50 Zn) MG Oral Cap Take 1 capsule (220 mg total) by mouth daily.      amLODIPine 5 MG Oral Tab Take 1 tablet (5 mg total) by mouth daily. 30 tablet 3    Betamethasone Dipropionate Aug 0.05 % External Cream Apply 30 g topically 2 (two) times daily. 50 g 2    atorvastatin 40 MG Oral Tab TAKE 1 TABLET BY MOUTH EVERY NIGHT 90 tablet 3    albuterol 108 (90 Base) MCG/ACT Inhalation Aero Soln Inhale 2 puffs into the lungs every 6 (six) hours as needed for Wheezing. 8.5 g 3    cyanocobalamine 1000 MCG Oral Tab Take 1 tablet (1,000 mcg total) by mouth daily. 90 tablet 1    ASPIRIN 81 OR Take by mouth.      Tadalafil 20 MG Oral Tab Take 1 tablet (20 mg total) by mouth daily as needed for Erectile Dysfunction. 20 tablet 2      Past Medical History:    Allergic rhinitis    Anxiety    Arthritis    High cholesterol    Hyperlipidemia    Tobacco abuse      Past Surgical History:   Procedure Laterality Date    Colonoscopy  07/2019    Colonoscopy N/A 07/25/2019    Procedure: COLONOSCOPY;  Surgeon: Deep Allred MD;  Location: [facility] ENDOSCOPY    Excis infratent brain tumor      Laminectomy  2001    Other surgical history        Family History   Problem Relation Age of Onset    Heart Disease Father     Heart Disease Mother     Lung Disorder Mother          emphysema      Social History:  Social History     Socioeconomic History    Marital status:    Tobacco Use    Smoking status: Every Day     Current packs/day: 1.00     Average packs/day: 1 pack/day for 40.0 years (40.0 ttl pk-yrs)     Types: Cigarettes     Passive exposure: Never    Smokeless tobacco: Current    Tobacco comments:     tried quitting 4/1/18; back to 1PPD   Vaping Use    Vaping status: Never Used   Substance and Sexual Activity    Alcohol use: No    Drug use: No   Other Topics Concern    Caffeine Concern Yes     Comment: Coffee         Labs:   Lab Results   Component Value Date/Time    GLU 87 07/22/2025 06:28 PM     07/22/2025 06:28 PM    K 3.7 07/22/2025 06:28 PM     07/22/2025 06:28 PM    CO2 26.0 07/22/2025 06:28 PM    CREATSERUM 1.02 07/22/2025 06:28 PM    CA 9.3 07/22/2025 06:28 PM    AST 22 07/22/2025 06:28 PM    ALT 28 07/22/2025 06:28 PM    TSH 1.161 07/22/2025 06:28 PM    T4F 0.87 04/01/2018 09:45 AM        Lab Results   Component Value Date/Time    CHOLEST 149 07/22/2025 06:28 PM    HDL 42 07/22/2025 06:28 PM    TRIG 170 (H) 07/22/2025 06:28 PM    LDL 78 07/22/2025 06:28 PM    NONHDLC 107 07/22/2025 06:28 PM           Neck Pain   This is a chronic problem. The current episode started more than 1 year ago. The problem occurs daily. The problem has been gradually worsening. The pain is present in the left side. The quality of the pain is described as aching and burning. The pain is at a severity of 7/10. The pain is moderate. The symptoms are aggravated by bending, position and twisting. Associated symptoms include paresis, tingling and weakness. Pertinent negatives include no chest pain, fever, headaches, numbness, pain with swallowing, photophobia, syncope, trouble swallowing or visual change. Treatments tried: Has tried cortisone in the past and does not want.   Hand Pain   The pain is present in the right hand. This is a new problem. The current episode started  more than 1 month ago. There has been a history of trauma. The problem has been gradually worsening. The quality of the pain is described as aching. The pain is at a severity of 5/10. Associated symptoms include tingling. Pertinent negatives include no fever or numbness. The symptoms are aggravated by activity. He has tried nothing for the symptoms. Family history does not include gout or rheumatoid arthritis. His past medical history is significant for osteoarthritis. There is no history of diabetes, gout or rheumatoid arthritis.         Review of Systems   Constitutional: Negative.  Negative for activity change, appetite change, chills, diaphoresis, fatigue, fever and unexpected weight change.   HENT: Negative.  Negative for congestion, dental problem, drooling, ear discharge, ear pain, facial swelling, hearing loss, mouth sores, nosebleeds, postnasal drip, rhinorrhea, sinus pressure, sinus pain, sneezing, sore throat, tinnitus, trouble swallowing and voice change.    Eyes: Negative.  Negative for photophobia, pain, discharge, redness, itching and visual disturbance.   Respiratory: Negative.  Negative for apnea, cough, choking, chest tightness, shortness of breath, wheezing and stridor.    Cardiovascular: Negative.  Negative for chest pain, palpitations, leg swelling and syncope.   Gastrointestinal: Negative.  Negative for abdominal distention, abdominal pain, anal bleeding, blood in stool, constipation, diarrhea, nausea, rectal pain and vomiting.   Endocrine: Negative for cold intolerance, heat intolerance, polydipsia, polyphagia and polyuria.   Genitourinary: Negative.  Negative for decreased urine volume, difficulty urinating, dysuria, flank pain, frequency, genital sores, hematuria, penile discharge, penile pain, penile swelling, scrotal swelling, testicular pain and urgency.   Musculoskeletal:  Positive for neck pain. Negative for gout.   Skin: Negative.  Negative for color change, pallor, rash and wound.    Neurological:  Positive for tingling and weakness. Negative for dizziness, tremors, seizures, syncope, facial asymmetry, speech difficulty, light-headedness, numbness and headaches.   Psychiatric/Behavioral:  Positive for sleep disturbance. Negative for agitation, behavioral problems, confusion, decreased concentration, dysphoric mood, hallucinations, self-injury and suicidal ideas. The patient is not nervous/anxious and is not hyperactive.    All other systems reviewed and are negative.        Current Outpatient Medications   Medication Sig Dispense Refill    cyclobenzaprine 10 MG Oral Tab Take 1 tablet (10 mg total) by mouth nightly. 90 tablet 0    pregabalin (LYRICA) 100 MG Oral Cap Take 2 capsules (200 mg total) by mouth every evening. 60 capsule 1    celecoxib (CELEBREX) 100 MG Oral Cap Take 1 capsule (100 mg total) by mouth 2 (two) times daily. 60 capsule 1    buPROPion  MG Oral Tablet 12 Hr Take 1 tablet (150 mg total) by mouth 2 (two) times daily. 180 tablet 1    hydroCHLOROthiazide 12.5 MG Oral Cap Take 1 capsule (12.5 mg total) by mouth daily. 90 capsule 1    amLODIPine 10 MG Oral Tab Take 1 tablet (10 mg total) by mouth daily.      naproxen 500 MG Oral Tab Take 1 tablet (500 mg total) by mouth 2 (two) times daily with meals. 180 tablet 1    Cholecalciferol (VITAMIN D3) 25 MCG (1000 UT) Oral Cap Take 1 tablet by mouth daily.      zinc sulfate 220 (50 Zn) MG Oral Cap Take 1 capsule (220 mg total) by mouth daily.      amLODIPine 5 MG Oral Tab Take 1 tablet (5 mg total) by mouth daily. 30 tablet 3    Betamethasone Dipropionate Aug 0.05 % External Cream Apply 30 g topically 2 (two) times daily. 50 g 2    atorvastatin 40 MG Oral Tab TAKE 1 TABLET BY MOUTH EVERY NIGHT 90 tablet 3    albuterol 108 (90 Base) MCG/ACT Inhalation Aero Soln Inhale 2 puffs into the lungs every 6 (six) hours as needed for Wheezing. 8.5 g 3    cyanocobalamine 1000 MCG Oral Tab Take 1 tablet (1,000 mcg total) by mouth daily. 90  tablet 1    ASPIRIN 81 OR Take by mouth.      Tadalafil 20 MG Oral Tab Take 1 tablet (20 mg total) by mouth daily as needed for Erectile Dysfunction. 20 tablet 2     Allergies:  Allergies   Allergen Reactions    Morphine NAUSEA AND VOMITING       HISTORY:  Past Medical History:    Allergic rhinitis    Anxiety    Arthritis    High cholesterol    Hyperlipidemia    Tobacco abuse      Past Surgical History:   Procedure Laterality Date    Colonoscopy  07/2019    Colonoscopy N/A 07/25/2019    Procedure: COLONOSCOPY;  Surgeon: Deep Allred MD;  Location: [facility] ENDOSCOPY    Excis infratent brain tumor      Laminectomy  2001    Other surgical history        Family History   Problem Relation Age of Onset    Heart Disease Father     Heart Disease Mother     Lung Disorder Mother         emphysema      Social History:   Social History     Socioeconomic History    Marital status:    Tobacco Use    Smoking status: Every Day     Current packs/day: 1.00     Average packs/day: 1 pack/day for 40.0 years (40.0 ttl pk-yrs)     Types: Cigarettes     Passive exposure: Never    Smokeless tobacco: Current    Tobacco comments:     tried quitting 4/1/18; back to 1PPD   Vaping Use    Vaping status: Never Used   Substance and Sexual Activity    Alcohol use: No    Drug use: No   Other Topics Concern    Caffeine Concern Yes     Comment: Coffee     Social Drivers of Health      Received from [facility]    Housing Stability        Hypertension  Patient is here for follow up of hypertension. BP at home: not check.   Has been compliant with medications.  Exercise level: somewhat active and has been following low salt diet.  Weight has been stable.  Wt Readings from Last 3 Encounters:   07/22/25 153 lb 3.2 oz (69.5 kg)   01/21/25 146 lb (66.2 kg)   12/10/24 144 lb (65.3 kg)     BP Readings from Last 3 Encounters:   07/22/25 108/73   01/21/25 128/69   12/10/24 138/72     Labs:   Lab Results   Component Value Date/Time     GLU 87 07/22/2025 06:28 PM     07/22/2025 06:28 PM    K 3.7 07/22/2025 06:28 PM     07/22/2025 06:28 PM    CO2 26.0 07/22/2025 06:28 PM    CREATSERUM 1.02 07/22/2025 06:28 PM    CA 9.3 07/22/2025 06:28 PM    AST 22 07/22/2025 06:28 PM    ALT 28 07/22/2025 06:28 PM    TSH 1.161 07/22/2025 06:28 PM    T4F 0.87 04/01/2018 09:45 AM        Lab Results   Component Value Date/Time    CHOLEST 149 07/22/2025 06:28 PM    HDL 42 07/22/2025 06:28 PM    TRIG 170 (H) 07/22/2025 06:28 PM    LDL 78 07/22/2025 06:28 PM    NONHDLC 107 07/22/2025 06:28 PM          Wt Readings from Last 3 Encounters:   07/22/25 153 lb 3.2 oz (69.5 kg)   01/21/25 146 lb (66.2 kg)   12/10/24 144 lb (65.3 kg)     BP Readings from Last 3 Encounters:   07/22/25 108/73   01/21/25 128/69   12/10/24 138/72       Labs:   Lab Results   Component Value Date/Time    GLU 87 07/22/2025 06:28 PM     07/22/2025 06:28 PM    K 3.7 07/22/2025 06:28 PM     07/22/2025 06:28 PM    CO2 26.0 07/22/2025 06:28 PM    CREATSERUM 1.02 07/22/2025 06:28 PM    CA 9.3 07/22/2025 06:28 PM    AST 22 07/22/2025 06:28 PM    ALT 28 07/22/2025 06:28 PM    TSH 1.161 07/22/2025 06:28 PM    T4F 0.87 04/01/2018 09:45 AM        Lab Results   Component Value Date/Time    CHOLEST 149 07/22/2025 06:28 PM    HDL 42 07/22/2025 06:28 PM    TRIG 170 (H) 07/22/2025 06:28 PM    LDL 78 07/22/2025 06:28 PM    NONHDLC 107 07/22/2025 06:28 PM          PHYSICAL EXAM:   /73 (BP Location: Right arm, Patient Position: Sitting, Cuff Size: large)   Pulse 85   Temp 98.2 °F (36.8 °C) (Temporal)   Ht 5' 8\" (1.727 m)   Wt 153 lb 3.2 oz (69.5 kg)   SpO2 100%   BMI 23.29 kg/m²   BP Readings from Last 3 Encounters:   07/22/25 108/73   01/21/25 128/69   12/10/24 138/72     Wt Readings from Last 3 Encounters:   07/22/25 153 lb 3.2 oz (69.5 kg)   01/21/25 146 lb (66.2 kg)   12/10/24 144 lb (65.3 kg)       Physical Exam  Vitals and nursing note reviewed.   Constitutional:       General: He  is not in acute distress.     Appearance: Normal appearance. He is well-developed and well-groomed. He is not ill-appearing, toxic-appearing or diaphoretic.      Interventions: He is not intubated.  HENT:      Head: Normocephalic and atraumatic.      Right Ear: Hearing, tympanic membrane, ear canal and external ear normal. No decreased hearing noted. No laceration, drainage, swelling or tenderness. No middle ear effusion. There is no impacted cerumen. No foreign body. No mastoid tenderness. No PE tube. No hemotympanum. Tympanic membrane is not injected, scarred, perforated, erythematous, retracted or bulging. Tympanic membrane has normal mobility.      Left Ear: Hearing, tympanic membrane, ear canal and external ear normal. No decreased hearing noted. No laceration, drainage, swelling or tenderness.  No middle ear effusion. There is no impacted cerumen. No foreign body. No mastoid tenderness. No PE tube. No hemotympanum. Tympanic membrane is not injected, scarred, perforated, erythematous, retracted or bulging. Tympanic membrane has normal mobility.      Nose:      Right Sinus: No maxillary sinus tenderness or frontal sinus tenderness.      Left Sinus: No maxillary sinus tenderness or frontal sinus tenderness.      Mouth/Throat:      Lips: Pink. No lesions.      Mouth: Mucous membranes are moist. No injury, lacerations, oral lesions or angioedema.      Dentition: No dental tenderness, gingival swelling, dental abscesses or gum lesions.      Tongue: No lesions. Tongue does not deviate from midline.      Palate: No mass and lesions.      Pharynx: No pharyngeal swelling, oropharyngeal exudate, posterior oropharyngeal erythema or uvula swelling.      Tonsils: No tonsillar exudate or tonsillar abscesses.   Eyes:      General: Lids are normal. Gaze aligned appropriately. No scleral icterus.        Right eye: No foreign body, discharge or hordeolum.         Left eye: No foreign body, discharge or hordeolum.       Extraocular Movements: Extraocular movements intact.      Right eye: Normal extraocular motion and no nystagmus.      Left eye: Normal extraocular motion and no nystagmus.      Conjunctiva/sclera: Conjunctivae normal.      Right eye: Right conjunctiva is not injected. No chemosis, exudate or hemorrhage.     Left eye: Left conjunctiva is not injected. No chemosis, exudate or hemorrhage.     Pupils: Pupils are equal, round, and reactive to light.   Neck:      Thyroid: No thyroid mass, thyromegaly or thyroid tenderness.      Vascular: No carotid bruit or JVD.      Trachea: Trachea and phonation normal. No tracheal tenderness, tracheostomy, abnormal tracheal secretions or tracheal deviation.   Cardiovascular:      Rate and Rhythm: Normal rate and regular rhythm.      Pulses: Normal pulses.           Carotid pulses are 2+ on the right side and 2+ on the left side.       Radial pulses are 2+ on the right side and 2+ on the left side.        Dorsalis pedis pulses are 2+ on the right side and 2+ on the left side.        Posterior tibial pulses are 2+ on the right side and 2+ on the left side.      Heart sounds: Normal heart sounds, S1 normal and S2 normal.   Pulmonary:      Effort: Pulmonary effort is normal. No tachypnea, bradypnea, accessory muscle usage, prolonged expiration, respiratory distress or retractions. He is not intubated.      Breath sounds: Normal breath sounds and air entry. No stridor, decreased air movement or transmitted upper airway sounds. No decreased breath sounds, wheezing, rhonchi or rales.   Chest:      Chest wall: No tenderness.   Abdominal:      General: Bowel sounds are normal. There is no distension.      Palpations: Abdomen is soft. Abdomen is not rigid.      Tenderness: There is no abdominal tenderness. There is no right CVA tenderness, left CVA tenderness, guarding or rebound.   Musculoskeletal:      Right shoulder: No swelling, deformity, effusion, laceration, tenderness, bony tenderness or  crepitus. Decreased range of motion. Normal strength. Normal pulse.      Left shoulder: No swelling, deformity, effusion, laceration, tenderness, bony tenderness or crepitus. Decreased range of motion. Normal strength. Normal pulse.      Right upper arm: No swelling, edema, deformity, lacerations, tenderness or bony tenderness.      Left upper arm: No swelling, edema, deformity, lacerations, tenderness or bony tenderness.      Right elbow: No swelling, deformity, effusion or lacerations. Normal range of motion. No tenderness.      Left elbow: No swelling, deformity, effusion or lacerations. Normal range of motion. No tenderness.      Right forearm: No swelling, edema, deformity, lacerations, tenderness or bony tenderness.      Left forearm: No swelling, edema, deformity, lacerations, tenderness or bony tenderness.      Right wrist: No snuff box tenderness. Normal range of motion.      Left wrist: No snuff box tenderness. Normal range of motion.      Right hand: Decreased strength. Normal sensation of the median distribution.      Left hand: No bony tenderness. Decreased strength. Decreased sensation of the median distribution. Normal sensation of the ulnar distribution.      Cervical back: Neck supple.      Right lower leg: No edema.      Left lower leg: No edema.      Comments: Negative Hawking's test bilateral shoulders.  Thenar atrophy bilaterally.   Lymphadenopathy:      Head:      Right side of head: No submental, submandibular, preauricular, posterior auricular or occipital adenopathy.      Left side of head: No submental, submandibular, preauricular, posterior auricular or occipital adenopathy.      Cervical: No cervical adenopathy.      Right cervical: No superficial, deep or posterior cervical adenopathy.     Left cervical: No superficial, deep or posterior cervical adenopathy.      Upper Body:      Right upper body: No supraclavicular adenopathy.      Left upper body: No supraclavicular adenopathy.    Skin:     General: Skin is warm and dry.      Coloration: Skin is not pale.      Findings: No erythema or rash.      Nails: There is no clubbing.   Neurological:      Mental Status: He is alert and oriented to person, place, and time.   Psychiatric:         Speech: Speech normal.         Behavior: Behavior normal. Behavior is cooperative.     Physical Exam          ASSESSMENT/PLAN:     Encounter Diagnoses   Name Primary?    High cholesterol Check blood.    Yes    Vaccine counseling holding all vaccines.       Essential hypertension Stable. Careful with diet and excercise at least 30 minutes 3-4 times a week. Check blood pressures at different times on different days. Can purchase own blood pressure monitor. If not, check at local pharmacy. Bake foods more and grill occasionally. Avoid fried foods. No salt. Use other seasonings.            Tobacco abuse discussed with patient of quitting.  Not ready yet and does not want to quit.       Hyperglycemia Check blood.       Prostate cancer screening nocturia x 1.  But not disruptive.  Check blood.       Adult general medical exam check blood and urine.       Cervical spinal stenosis     Cervical radiculopathy slight worsening.  Discussed about options.  Will check CT.  Full physical therapy and cortisone for patient.  Gabapentin may be not working.  Will switch to Lyrica.  Discussed about side effects and use.  Can start at 100 mg at night and maybe increase to 200 after 2 weeks if needed.  Not sure what component of your nerve.  Would recommend carpal tunnel wrist braces at night especially.       Hand pain, left Check blood and x-rays.        Orders Placed This Encounter   Procedures    Lipid Panel    CBC With Differential With Platelet    Comp Metabolic Panel (14)    TSH W Reflex To Free T4    PSA Total, Screen    URINALYSIS, AUTO, W/O SCOPE    Hemoglobin A1C    Sed Rate, Westergren (Automated)    Rheumatoid Arthritis Factor    Tobacco Use Counseling 3-10 Min [37842]        Meds This Visit:  Requested Prescriptions     Signed Prescriptions Disp Refills    cyclobenzaprine 10 MG Oral Tab 90 tablet 0     Sig: Take 1 tablet (10 mg total) by mouth nightly.    pregabalin (LYRICA) 100 MG Oral Cap 60 capsule 1     Sig: Take 2 capsules (200 mg total) by mouth every evening.    celecoxib (CELEBREX) 100 MG Oral Cap 60 capsule 1     Sig: Take 1 capsule (100 mg total) by mouth 2 (two) times daily.       Imaging & Referrals:  CT SPINE CERVICAL (CPT=72125)  XR HAND (2 VIEWS), LEFT (CPT=73120)  XR HAND (2 VIEWS), RIGHT (CPT=73120)      RTC 1-26 for FU.     Tobacco cessation counseling for 3-10 minutes (add E/M code #12585).

## 2025-07-23 ENCOUNTER — RESULTS FOLLOW-UP (OUTPATIENT)
Dept: INTERNAL MEDICINE CLINIC | Facility: CLINIC | Age: 65
End: 2025-07-23

## 2025-07-23 DIAGNOSIS — R80.8 OTHER PROTEINURIA: Primary | ICD-10-CM

## 2025-07-23 DIAGNOSIS — E78.00 HIGH CHOLESTEROL: ICD-10-CM

## 2025-07-23 RX ORDER — TADALAFIL 20 MG/1
20 TABLET ORAL
Qty: 20 TABLET | Refills: 2 | Status: SHIPPED | OUTPATIENT
Start: 2025-07-23

## 2025-07-24 RX ORDER — ATORVASTATIN CALCIUM 40 MG/1
TABLET, FILM COATED ORAL
Qty: 90 TABLET | Refills: 3 | Status: SHIPPED | OUTPATIENT
Start: 2025-07-24

## 2025-07-24 RX ORDER — MELATONIN
1000 DAILY
Qty: 90 TABLET | Refills: 1 | Status: SHIPPED | OUTPATIENT
Start: 2025-07-24

## 2025-07-24 RX ORDER — GABAPENTIN 600 MG/1
TABLET ORAL
Qty: 270 TABLET | Refills: 3 | OUTPATIENT
Start: 2025-07-24

## 2025-07-24 RX ORDER — ALBUTEROL SULFATE 90 UG/1
2 INHALANT RESPIRATORY (INHALATION) EVERY 6 HOURS PRN
Qty: 8.5 G | Refills: 1 | Status: SHIPPED | OUTPATIENT
Start: 2025-07-24

## 2025-07-24 RX ORDER — BUPROPION HYDROCHLORIDE 150 MG/1
150 TABLET, EXTENDED RELEASE ORAL 2 TIMES DAILY
Qty: 180 TABLET | Refills: 1 | OUTPATIENT
Start: 2025-07-24

## 2025-07-24 RX ORDER — CELECOXIB 100 MG/1
100 CAPSULE ORAL 2 TIMES DAILY
Qty: 180 CAPSULE | Refills: 0 | OUTPATIENT
Start: 2025-07-24

## 2025-07-24 NOTE — TELEPHONE ENCOUNTER
For replies, please route to pool: [facility] CENTRAL REFILLS    Please review: medication fails/has no protocol attached.  No future appointments with primary care medicine   Future Appointments   Date Time Provider Department Center   2/3/2026  6:30 PM Kath Morris MD [id number]  [place] [address]     Cyanocobalamin last prescribed 2021

## 2025-07-24 NOTE — TELEPHONE ENCOUNTER
Refill passed per [facility] protocol.      Requested Prescriptions   Pending Prescriptions Disp Refills    Tadalafil 20 MG Oral Tab 20 tablet 2     Sig: Take 1 tablet (20 mg total) by mouth daily as needed for Erectile Dysfunction.       Genitourinary Medications Passed - 7/23/2025  8:20 PM        Passed - Patient does not have pulmonary hypertension on problem list        Passed - In person appointment or virtual visit in the past 12 mos or appointment in next 3 mos     Recent Outpatient Visits              Yesterday High cholesterol    [facility] [place] Kath Cruz MD    Office Visit    5 months ago Acute cough    [facility], Virtual Visit Paige Zepeda PA    E-Visit    6 months ago High cholesterol    [facility] [place] Kath Cruz MD    [facility]    [facility]    [facility]          Future Appointments         Provider Department Appt Notes    In 6 months Kath Morris MD [facility] 6 MONTH FU                    Passed - Medication is active on med list              Future Appointments         Provider Department Appt Notes    In 6 months Kath Morris MD [facility] 6 MONTH FU            Recent Outpatient Visits              Yesterday High cholesterol    [facility] [place] Kath Cruz MD    Office Visit    5 months ago Acute cough    [facility], Virtual Visit Paige Zepeda PA    E-Visit    6 months ago High cholesterol    [facility] [place] Kath Cruz MD    Office Visit    7 months ago  High cholesterol    [facility], Kath Cruz MD    Office Visit    1 year ago High cholesterol    [facility], Kath Cruz MD    Office Visit

## 2025-07-24 NOTE — PATIENT INSTRUCTIONS
ASSESSMENT/PLAN:     Encounter Diagnoses   Name Primary?    High cholesterol Check blood.    Yes    Vaccine counseling holding all vaccines.       Essential hypertension Stable. Careful with diet and excercise at least 30 minutes 3-4 times a week. Check blood pressures at different times on different days. Can purchase own blood pressure monitor. If not, check at local pharmacy. Bake foods more and grill occasionally. Avoid fried foods. No salt. Use other seasonings.            Tobacco abuse discussed with patient of quitting.  Not ready yet and does not want to quit.       Hyperglycemia Check blood.       Prostate cancer screening nocturia x 1.  But not disruptive.  Check blood.       Adult general medical exam check blood and urine.       Cervical spinal stenosis     Cervical radiculopathy slight worsening.  Discussed about options.  Will check CT.  Full physical therapy and cortisone for patient.  Gabapentin may be not working.  Will switch to Lyrica.  Discussed about side effects and use.  Can start at 100 mg at night and maybe increase to 200 after 2 weeks if needed.  Not sure what component of your nerve.  Would recommend carpal tunnel wrist braces at night especially.       Hand pain, left Check blood and x-rays.        Orders Placed This Encounter   Procedures    Lipid Panel    CBC With Differential With Platelet    Comp Metabolic Panel (14)    TSH W Reflex To Free T4    PSA Total, Screen    URINALYSIS, AUTO, W/O SCOPE    Hemoglobin A1C    Sed Rate, Westergren (Automated)    Rheumatoid Arthritis Factor    Tobacco Use Counseling 3-10 Min [10796]       Meds This Visit:  Requested Prescriptions     Signed Prescriptions Disp Refills    cyclobenzaprine 10 MG Oral Tab 90 tablet 0     Sig: Take 1 tablet (10 mg total) by mouth nightly.    pregabalin (LYRICA) 100 MG Oral Cap 60 capsule 1     Sig: Take 2 capsules (200 mg total) by mouth every evening.    celecoxib (CELEBREX) 100 MG Oral Cap 60 capsule 1     Sig: Take  1 capsule (100 mg total) by mouth 2 (two) times daily.       Imaging & Referrals:  CT SPINE CERVICAL (CPT=72125)  XR HAND (2 VIEWS), LEFT (CPT=73120)  XR HAND (2 VIEWS), RIGHT (CPT=73120)      RTC 1-26 for FU.     Quitting Smoking    Quitting smoking is the most important step you can take to improve your health. We're glad you have set a goal to improve your health.    Quit Smoking Resources    In addition to medications, use the STAR plan to help you successfully quit.   Stick with your quit date!   Tell friends, family, and coworkers your quit date. Request their understanding and support.  Anticipate and prepare for challenges. Some examples are withdrawal symptoms, being around others who smoke, and drinking alcohol.  Remove all tobacco products and paraphernalia from your environment. Make your home and vehicles smoke-free.    Free resources for additional support:  National tobacco quitline: 1-800-QUIT-NOW (1-975.768.8986).  SmokefreeTXT is a free text program to assist you in quitting. Visit https://www.smokefree.gov/smokefreetxt for more information.  Feel free to call your care manager at ([phone]) for additional support.    Getting Support for Quitting Smoking  You don’t have to go through the process of quitting smoking without support. Tell people you are quitting. The support of friends, coworkers, and family members can make a big difference. Face-to-face or telephone counseling can also be helpful, as can a stop-smoking class or an ex-smokers’ group.     Set a quit date  If you’re serious about quitting smoking, choose a specific quit date within the next 2 to 4 weeks. Tito it in bright, bold letters on a calendar you use often. Tell people about your quit date. Ask for their support. Let your friends, coworkers, and family know how they can help you quit.   Make a contract  A quit-smoking contract gives you a goal. Write out the contract and sign it. Have it witnessed, if you like. Then keep  the contract where you’ll see it often, or carry it with you. Read the contract when you’re tempted to smoke.   Take action  On the day you quit, reread your quit contract. Think about the benefits you gain by quitting, such as better health and an improved sense of taste, as well as the money you will save from not smoking. Also:   Remove cigarettes from your home, car, or any other place where you stash them.  Throw away all smoking materials, including matches, lighters, and ashtrays.  Review your list of triggers and your plan for coping with each of them.  Stay away from people or settings you link with smoking.  Make a quit kit that includes gum, mints, carrot sticks, and things to keep your hands and mouth busy.  Talk to your healthcare provider about using quit-smoking products, such as medicine or a nicotine patch, inhaler, nasal spray, gum, or lozenges.  Ask for help  Sometimes you may just need to talk when you miss smoking. Ex-smokers are good to talk to because they’re likely to know how you feel. You may need extra support in the first few weeks after you quit. Ask a friend to call you each day to see how you’re doing. Telephone counseling can also help you keep on track. Ask your healthcare provider, local hospital, or public health department to put you in touch with a phone counselor. You may also have to deal with doubters when you decide to quit. Explain to any doubters why you are quitting. Tell them that quitting is important to you. Ask for their support.   Tell your smoking buddies that you can walk together instead of smoking together. If someone thinks you won’t succeed, say that you have a good quit plan and ask for their support. Let them know you’re sticking with it. If they can't give you support, consider limiting contact with them for a while.   Stay positive, and if you slip, start again. Quitting smoking often requires repeated attempts. But smokers do quit for good. It's hard, but  with determination and support, you can do it.   To learn more  Get more tips from these resources:  CDC at www.cdc.gov/tobacco/quit_smoking  or 800-QUIT-NOW (567-247-2386)  [employer] [place] at www.smokefree.gov  or 877-44U-QUIT (831-049-3218)  American Lung Association at www.lung.org/stop-smoking  or 800-LUNGUSA (756-976-1693)  Isidro last reviewed this educational content on 12/1/2024  This information is for informational purposes only. This is not intended to be a substitute for professional medical advice, diagnosis, or treatment. Always seek the advice and follow the directions from your physician or other qualified health care provider.  © 1260-8714 [employer]. All rights reserved. This information is not intended as a substitute for professional medical care. Always follow your healthcare professional's instructions.

## 2025-07-24 NOTE — TELEPHONE ENCOUNTER
Duplicate Refill request/refill too soon.     celecoxib (CELEBREX) 100 MG - 2 month supply sent 7/22/25 to [facility]

## 2025-07-24 NOTE — TELEPHONE ENCOUNTER
Buproprion  m month supply sent 25    Gabapentin 600 mg: discontinued 25  Per 25 office visit with Dr. Morris:  Gabapentin may be not working. Will switch to Lyrica. Discussed about side effects and use. Can start at 100 mg at night and maybe increase to 200 after 2 weeks if needed.     Atorvastatin 40 mg: DUE    Cyanocobalamine 1000 mcg: last prescribed 21

## (undated) DIAGNOSIS — Z23 NEED FOR SHINGLES VACCINE: Primary | ICD-10-CM

## (undated) DEVICE — D-58 TAPERED ROUTER

## (undated) DEVICE — SUTURE VICRYL 0 CT-1

## (undated) DEVICE — SCREW THINFLAP BN CRNMXF 4MM
Type: IMPLANTABLE DEVICE | Site: SCALP | Status: NON-FUNCTIONAL
Removed: 2018-04-02

## (undated) DEVICE — 6 ML SYRINGE LUER-LOCK TIP: Brand: MONOJECT

## (undated) DEVICE — ABSORBABLE HEMOSTAT (OXIDIZED REGENERATED CELLULOSE, U.S.P.): Brand: SURGICEL

## (undated) DEVICE — PEN: MARKING STD PT 100/CS: Brand: MEDICAL ACTION INDUSTRIES

## (undated) DEVICE — 3M™ TEGADERM™ TRANSPARENT FILM DRESSING, 1626W, 4 IN X 4-3/4 IN (10 CM X 12 CM), 50 EACH/CARTON, 4 CARTON/CASE: Brand: 3M™ TEGADERM™

## (undated) DEVICE — SUTURE NUROLON 4-0 TF

## (undated) DEVICE — Device: Brand: CUSTOM PROCEDURE KIT

## (undated) DEVICE — NON-ADHERENT PAD PREPACK: Brand: TELFA

## (undated) DEVICE — CHLORAPREP 26ML APPLICATOR

## (undated) DEVICE — DISPOSABLE TUBING SET AND EXTENDER FILTER TUBING

## (undated) DEVICE — CODMAN® SURGICAL PATTIES 1/2" X1 1/2" (1.27CM X 3.81CM): Brand: CODMAN®

## (undated) DEVICE — 3 ML SYRINGE LUER-LOCK TIP: Brand: MONOJECT

## (undated) DEVICE — Device: Brand: IQ SYSTEM

## (undated) DEVICE — 35 ML SYRINGE REGULAR TIP: Brand: MONOJECT

## (undated) DEVICE — GAUZE SPONGES,12 PLY: Brand: CURITY

## (undated) DEVICE — STRAIGHT TIP, UNIVERSAL

## (undated) DEVICE — LINE MNTR ADLT SET O2 INTMD

## (undated) DEVICE — BLADE CRANI SHAVER 4412A

## (undated) DEVICE — DRAIN RELIAVAC 100CC

## (undated) DEVICE — SNARE CAPTIFLEX MICRO-OVL OLY

## (undated) DEVICE — BANDAGE ROLL,100% COTTON, 6 PLY, LARGE: Brand: KERLIX

## (undated) DEVICE — Device: Brand: DEFENDO AIR/WATER/SUCTION AND BIOPSY VALVE

## (undated) DEVICE — STERILE (10.2 X 147CM) TELESCOPICALLY-FOLDED COVER: Brand: CIV-FLEX™ TRANSDUCER COVER

## (undated) DEVICE — MAYFIELD® DISPOSABLE ADULT SKULL PIN (STAINLESS STEEL): Brand: MAYFIELD®

## (undated) DEVICE — SUTURE CHROMIC GUT 3-0 SH

## (undated) DEVICE — SUCTION CANISTER, 3000CC,SAFELINER: Brand: DEROYAL

## (undated) DEVICE — CODMAN® DISPOSABLE PERFORATOR 14MM: Brand: CODMAN®

## (undated) DEVICE — DRAIN RESERVOIR RELIAVAC 100CC

## (undated) DEVICE — PROXIMATE SKIN STAPLERS (35 WIDE) CONTAINS 35 STAINLESS STEEL STAPLES (FIXED HEAD): Brand: PROXIMATE

## (undated) DEVICE — STERILE LATEX POWDER-FREE SURGICAL GLOVESWITH NITRILE COATING: Brand: PROTEXIS

## (undated) DEVICE — DISPOSABLE REFLECTIVE MARKER SPHERES (DRMS) ATTACHED TO REFERENCE FRAMES AND SURGICAL INSTRUMENTS FOR USE DURING SURGICAL NAVIGATION IN IMAGE GUIDED SURGERY.  ONE RETAIL CARTON IS MADE UP OF 3 SPHERES PER TRAY, 30 TRAYS, 90 SPHERES TOTAL.: Brand: DISPOSABLE REFLECTIVE MARKER SPHERE (DRMS)

## (undated) DEVICE — SUCTION ESCP MLBL SCT 12FR STD

## (undated) DEVICE — EYE PADSSTERILENOT MADE WITH NATURAL RUBBER LATEXSINGLE USE ONLYDO NOT USE IF PACKAGE OPENED OR DAMAGED: Brand: CARDINAL HEALTH

## (undated) DEVICE — STRETCH BANDAGE: Brand: CURITY

## (undated) DEVICE — 20 ML SYRINGE LUER-LOCK TIP: Brand: MONOJECT

## (undated) DEVICE — SNARE OPTMZ PLPCTM TRP

## (undated) DEVICE — SOL  .9 1000ML BTL

## (undated) DEVICE — SUTURE VICRYL 2-0 CT-2

## (undated) DEVICE — OCCLUSIVE GAUZE STRIP,3% BISMUTH TRIBROMOPHENATE IN PETROLATUM BLEND: Brand: XEROFORM

## (undated) DEVICE — STANDARD HYPODERMIC NEEDLE,POLYPROPYLENE HUB: Brand: MONOJECT

## (undated) DEVICE — SUTURE ETHILON 3-0 669H

## (undated) DEVICE — CRANIOTOMY: Brand: MEDLINE INDUSTRIES, INC.

## (undated) DEVICE — MEDI-VAC NON-CONDUCTIVE SUCTION TUBING 6MM X 1.8M (6FT.) L: Brand: CARDINAL HEALTH

## (undated) DEVICE — REM POLYHESIVE ADULT PATIENT RETURN ELECTRODE: Brand: VALLEYLAB

## (undated) DEVICE — TOWEL OR BLU 16X26 STRL

## (undated) DEVICE — CAUTERY BLADE 2IN INS E1455

## (undated) NOTE — LETTER
Date & Time: 3/5/2019, 9:59 PM  Patient: Prosper Mancini  Encounter Provider(s):    ANNABELLE Bradshaw       To Whom It May Concern:    Prosper Mancini was seen and treated in our department on 3/5/2019.  He should not return to work [employer]

## (undated) NOTE — LETTER
Date & Time: 10/25/2023, 11:27 AM  Patient: [facility]  Encounter Provider(s):    ANNABELLE Roche       To Whom It May Concern:    Mara Henry was seen and treated in our department on 10/25/2023. He can return to work 10/27/23.     If you have any questions or concerns, please do not hesitate to call.        _____________________________  Physician/APC Signature

## (undated) NOTE — LETTER
[address] [place] [address]  Authorization for Invasive Procedures  1.  I hereby authorize                , my physician and whomever may be designated as the doctor's assistant, to perform the following operation and/or procedure: 5. I consent to the photographing of the operations or procedures to be performed for the purposes of advancing medicine, science, and/or education, provided my identity is not revealed.  If the procedure has been videotaped, the physician/surgeon will obta __________ Time: ___________    Statement of Physician  My signature below affirms that prior to the time of the procedure, I have explained to the patient and/or his legal representative, the risks and benefits involved in the proposed treatment and any r

## (undated) NOTE — LETTER
[facility]  Administration of Anesthesia  1.  I, Baljit Alegre, or _________________________________ acting on his behalf, (Patient) (Dependent/Representative) request to receive anesthesia for my pending procedure/operation/treatme infections, high spinal block, spinal bleeding, seizure, cardiac arrest and death. 7. AWARENESS: I understand that it is possible (but unlikely) to have explicit memory of events from the operating room while under general anesthesia.   8. ELECTROCONVULSIV unconscious pt /Relationship    My signature below affirms that prior to the time of the procedure, I have explained to the patient and/or his/her guardian, the risks and benefits of undergoing anesthesia, as well as any reasonable alternatives.     _______

## (undated) NOTE — LETTER
7/26/2019          [phone]    Dear Jann Hairston,     Here are the results from your recent testing :    During your colonoscopy a polyp was removed.   The pathology showed that this polyp was a hyper

## (undated) NOTE — ED AVS SNAPSHOT
Boris Olivas   MRN: [id number]    Department:  [facility] Emergency Department   Date of Visit:  3/5/2019           Disclosure     Insurance plans vary and the physician(s) referred by the ER may not be covered by your plan.  Please con CARE PHYSICIAN AT ONCE OR RETURN IMMEDIATELY TO THE EMERGENCY DEPARTMENT. If you have been prescribed any medication(s), please fill your prescription right away and begin taking the medication(s) as directed.   If you believe that any of the medications

## (undated) NOTE — LETTER
Patient Name: Marco Antonio Farfan  YOB: 1960          MRN number:  [id number]  Date:  5/17/2018  Referring Physician: Jose Flores P.T. EVALUATION:   Referring Physician: Dr. Mayra Poon  Diagnosis: Brain Meningioma (ICD-D32.0)  Craniotomy    D Posture: some slouching but corrects self easily when cued    Balance: WNL    Gait: Walks independently without a device. Does not present with any major gait deviations.    Dynamic Gait Index score: 24/24    Today’s Treatment and Response:  Patient educati

## (undated) NOTE — LETTER
4/3/2018          To Whom It May Concern:    Bret Kendall is currently under my medical care and may not return to work at this time. Please excuse Kalyani Littleots for 10 days. If you require additional information please contact our office.

## (undated) NOTE — LETTER
Dear Dr. Bird Cover,    This letter is to inform you that Bret Kendall has been attending Physical Therapy with me. See below for my most recent plan of care.       Physical Therapy Discharge Summary    Pt has attended 14 visits in Physical Therapy previous activities without deviations - NEARLY MET/MET    Patient was advised of these findings, precautions, and treatment options and has agreed to actively participate in planning and for this course of care.     Thank you for your referral. If you have

## (undated) NOTE — LETTER
Dear Dr. Avi Melendez    This letter is to inform you that Vitor Turpin has been attending Physical Therapy with me. See below for my most recent plan of care. Physical Therapy Discharge Summary    Pt has attended 14 visits in Physical Therapy. previous activities without deviations - NEARLY MET/MET    Patient was advised of these findings, precautions, and treatment options and has agreed to actively participate in planning and for this course of care.     Thank you for your referral. If you have